# Patient Record
Sex: FEMALE | Race: WHITE | NOT HISPANIC OR LATINO | Employment: FULL TIME | URBAN - METROPOLITAN AREA
[De-identification: names, ages, dates, MRNs, and addresses within clinical notes are randomized per-mention and may not be internally consistent; named-entity substitution may affect disease eponyms.]

---

## 2017-02-07 RX ORDER — ESTRADIOL 0.04 MG/D
FILM, EXTENDED RELEASE TRANSDERMAL
Qty: 8 PATCH | Refills: 0 | Status: SHIPPED | OUTPATIENT
Start: 2017-02-07 | End: 2017-03-02 | Stop reason: SDUPTHER

## 2017-02-24 PROBLEM — R53.83 FATIGUE: Status: ACTIVE | Noted: 2017-02-24

## 2017-02-24 PROBLEM — J30.9 ALLERGIC RHINITIS: Status: ACTIVE | Noted: 2017-02-24

## 2017-02-24 PROBLEM — E78.00 HYPERCHOLESTEROLEMIA: Status: ACTIVE | Noted: 2017-02-24

## 2017-02-24 PROBLEM — E55.9 VITAMIN D DEFICIENCY: Status: ACTIVE | Noted: 2017-02-24

## 2017-02-24 PROBLEM — E78.00 HYPERCHOLESTEROLEMIA: Status: RESOLVED | Noted: 2017-02-24 | Resolved: 2017-02-24

## 2017-02-24 PROBLEM — G47.00 INSOMNIA: Status: ACTIVE | Noted: 2017-02-24

## 2017-02-24 PROBLEM — E55.9 VITAMIN D DEFICIENCY: Status: RESOLVED | Noted: 2017-02-24 | Resolved: 2017-02-24

## 2017-02-24 PROBLEM — J30.9 ALLERGIC RHINITIS: Status: RESOLVED | Noted: 2017-02-24 | Resolved: 2017-02-24

## 2017-02-24 PROBLEM — G62.9 PERIPHERAL NEUROPATHY: Status: ACTIVE | Noted: 2017-02-24

## 2017-02-24 PROBLEM — G62.9 PERIPHERAL NEUROPATHY: Status: RESOLVED | Noted: 2017-02-24 | Resolved: 2017-02-24

## 2017-03-02 ENCOUNTER — OFFICE VISIT (OUTPATIENT)
Dept: OBSTETRICS AND GYNECOLOGY | Age: 57
End: 2017-03-02

## 2017-03-02 VITALS
HEIGHT: 65 IN | DIASTOLIC BLOOD PRESSURE: 72 MMHG | BODY MASS INDEX: 22.99 KG/M2 | SYSTOLIC BLOOD PRESSURE: 112 MMHG | WEIGHT: 138 LBS

## 2017-03-02 DIAGNOSIS — Z78.0 MENOPAUSE: ICD-10-CM

## 2017-03-02 DIAGNOSIS — Z79.890 POST-MENOPAUSE ON HRT (HORMONE REPLACEMENT THERAPY): ICD-10-CM

## 2017-03-02 DIAGNOSIS — N76.0 VAGINITIS AND VULVOVAGINITIS: ICD-10-CM

## 2017-03-02 DIAGNOSIS — Z01.419 WELL WOMAN EXAM WITH ROUTINE GYNECOLOGICAL EXAM: Primary | ICD-10-CM

## 2017-03-02 LAB
BILIRUB BLD-MCNC: NEGATIVE MG/DL
GLUCOSE UR STRIP-MCNC: NEGATIVE MG/DL
KETONES UR QL: NEGATIVE
LEUKOCYTE EST, POC: ABNORMAL
NITRITE UR-MCNC: NEGATIVE MG/ML
PH UR: 7 [PH] (ref 5–8)
PROT UR STRIP-MCNC: NEGATIVE MG/DL
RBC # UR STRIP: NEGATIVE /UL
SP GR UR: 1.03 (ref 1–1.03)
UROBILINOGEN UR QL: NORMAL

## 2017-03-02 PROCEDURE — 81003 URINALYSIS AUTO W/O SCOPE: CPT | Performed by: OBSTETRICS & GYNECOLOGY

## 2017-03-02 PROCEDURE — 99396 PREV VISIT EST AGE 40-64: CPT | Performed by: OBSTETRICS & GYNECOLOGY

## 2017-03-02 RX ORDER — ESTRADIOL 0.04 MG/D
FILM, EXTENDED RELEASE TRANSDERMAL
Qty: 8 PATCH | Refills: 12 | Status: SHIPPED | OUTPATIENT
Start: 2017-03-02 | End: 2018-03-07 | Stop reason: SDUPTHER

## 2017-03-02 NOTE — PROGRESS NOTES
ANNUAL GYN EXAM    Subjective 5  Aleja Garcia is a 56 y.o., G5, ,  White  Female.    Chief complaint: Annual Exam    History of Present Illness:     No GYN complaints    1.Menstrual Hx: LMP= .  ( but had FSH= 129 in .)  Her  was 6 months before her LMP.  She would have hot flashes off and on multiple times during the day.  She occasionally had mild night sweats.  [place] was uncomfortable.  She was using lubrication.  Then last year she began using estradiol patches 0.0375 mg twice weekly and Prometrium 100 mg daily, and is doing much better.  We discussed the possible addition of testosterone cream and she will call if she wants to pursue that.    2.Pap Smear Hx: Never had an abnormal Pap smear.  [id number], normal annual Pap smears.  , negative Pap smear, negative HR-HPV.  , also had biopsy of the small granular tuft of tissue at 5:00= chronic cervicitis and squamous metaplasia.  Next Pap smear .    3.Breast / Ovarian Hx: Occasional SBE.     No family history of breast cancer or ovarian cancer.    4.Family Hx of Colon Ca:  None. Had a colonscopy at age 51.  She was called for a F/U at 5 years, but uncertain why.  No family history of colon cancer and her colonoscopy, apparently, was normal.  I recommended she call their office and find out the details of her last colonoscopy.     Family Hx of Uterine Ca:  None.     5.New Past Medical Hx:   None.  It is not new but she has been battling with a peripheral neuropathy for at least 10 years.  Since her delivery by  section in , she has gradually developed a burning, tingling sensation in her feet.  It is uncomfortable for her to walk barefoot it or in her sock feet.  She has to wear open shoes frequently because closed shoes using intensify the sensation.  She can no longer flex her toes but down she can hyperextend them and you can physically flex her toes down but she cannot do that with the muscles in her feet.  This  "affects both feet equally.  She has no other motor or sensory complaints.  She's never had a CT scan or MRI of her head or back(as of last year), but she has had a couple of nerve conduction studies done.    6.[facility] Medical Hx:  Father Dx'd with Lung Cancer.       The following portions of the patient's history were reviewed / updated as appropriate: allergies, current medications, past medical history, past surgical history, past family history, past social history, and problem list.      Review of Systems   HENT: Positive for congestion (secondary to allergies, uses Claritin and Flonase as needed.).    Eyes: Positive for visual disturbance (wears contacts).   Gastrointestinal:        More gas.   Skin:        Has a history of moles and periodically sees a dermatologist.  Her current dermatologist has retired.   Allergic/Immunologic: Positive for environmental allergies (seasonal allergies.).   Neurological: Positive for numbness (peripheral neuropathy developing since 2005.) and headaches (sinus headaches).   All other systems reviewed and are negative.      Objective     Visit Vitals   • /72   • Ht 64.5\" (163.8 cm)   • Wt 138 lb (62.6 kg)   • BMI 23.32 kg/m2       PHYSICAL EXAM    Constitutional: Well-developed, well-nourished, thin white female, in no distress, alert and well oriented ×3.    Skin is warm, dry, without rash.       Neck appears normal, trachea in the midline.  Thyroid exam normal.  No carotid bruits bilaterally.         No cervical lymphadenopathy.    Lungs clear to auscultation.  Chest wall appears normal.    Heart with regular rhythm without murmur or gallop.    Breasts: SBE was strongly encouraged.        Right breast nontender, without dominant mass.  No nipple discharge.            No superficial skin changes.  No axillary adenopathy.        Left breast nontender, without dominant mass.  No nipple discharge.            No superficial skin changes.  No axillary adenopathy.      Abdomen " is flat, soft and nontender.No palpable mass.           No hepatosplenomegaly.  Flanks are negative.          Bowel sounds normal.  No abdominal bruit.          No inguinal lymphadenopathy bilaterally.     Pelvic exam :  Vulva normal.           External genitalia normal.  BUS negative.  Yellowish discharge at the introitus.            Introitus normal, but yellowish discharge is present.  Vaginal mucosa looks estrogenized.  Whitish discharge and upper vagina.  Wet prep was normal.  Rare parabasal cells seen.          Cervix normal, slight eversion, no Pap smear, no cervical motion tenderness.          Uterus midplane to slightly retroverted, normal size, normal shape, and position.          Adnexa are negative bilaterally.  No tenderness.  No palpable mass.          Rectovaginal exam negative .  Stool heme negative.    Musc /Skel: Lower extremities without edema.     Neuro: Coordination is grossly normal.  Gait is normal.    Psych: Mood and affect is normal.  Behavior normal.  Thought content normal.            Judgment normal.          Aleja was seen today for gynecologic exam.    Diagnoses and all orders for this visit:    Well woman exam with routine gynecological exam  -     POC Urinalysis Dipstick, Automated    Post-menopause on HRT (hormone replacement therapy)  -     progesterone (PROMETRIUM) 100 MG capsule; Take 1 capsule by mouth Daily.  -     estradiol (VIVELLE-DOT) 0.0375 MG/24HR; APPLY 1 PATCH EXTERNALLY TO THE SKIN 2 TIMES A WEEK.    Menopause  -     progesterone (PROMETRIUM) 100 MG capsule; Take 1 capsule by mouth Daily.  -     estradiol (VIVELLE-DOT) 0.0375 MG/24HR; APPLY 1 PATCH EXTERNALLY TO THE SKIN 2 TIMES A WEEK.    Vaginitis and vulvovaginitis  Comments:  Asymptomatic.  Yellow discharge noticed at the introitus.  But is normal-appearing and vagina.  Wet prep negative.NuSwab sent.  Orders:  -     NuSwab Vaginitis (VG)      COMMENTS: The yellowish discharge at the introitus was a little bit of a  surprise.  The discharge and upper vagina looks normal.  Wet prep did not show yeast or BV and there were rare parabasal cells possibly suggesting that she could use with a higher dose of estrogen from a vaginal standpoint.  I explained this to her. She really does not have vaginal complaints though.  NuSwab of the vaginal discharge was sent.

## 2017-03-07 LAB
A VAGINAE DNA VAG QL NAA+PROBE: ABNORMAL SCORE
BVAB2 DNA VAG QL NAA+PROBE: ABNORMAL SCORE
C ALBICANS DNA VAG QL NAA+PROBE: POSITIVE
C GLABRATA DNA VAG QL NAA+PROBE: NEGATIVE
MEGA1 DNA VAG QL NAA+PROBE: ABNORMAL SCORE
T VAGINALIS RRNA SPEC QL NAA+PROBE: NEGATIVE

## 2017-03-17 RX ORDER — FLUCONAZOLE 150 MG/1
150 TABLET ORAL ONCE
Qty: 1 TABLET | Refills: 0 | Status: SHIPPED | OUTPATIENT
Start: 2017-03-17 | End: 2017-03-17

## 2017-03-17 RX ORDER — FLUCONAZOLE 150 MG/1
TABLET ORAL
Qty: 2 TABLET | Refills: 0 | Status: SHIPPED | OUTPATIENT
Start: 2017-03-17 | End: 2017-03-17

## 2017-03-17 NOTE — PROGRESS NOTES
Notify Aleja that the vaginal swab test did come back positive for yeast, Candida albicans, and was negative for bacterial vaginosis and Trichomonas.  Since she wasn't having any symptoms we should treat this with a single dose of Diflucan 150 mg.

## 2017-03-31 DIAGNOSIS — Z78.0 MENOPAUSE: ICD-10-CM

## 2017-03-31 DIAGNOSIS — Z79.890 POST-MENOPAUSE ON HRT (HORMONE REPLACEMENT THERAPY): ICD-10-CM

## 2018-03-07 ENCOUNTER — OFFICE VISIT (OUTPATIENT)
Dept: OBSTETRICS AND GYNECOLOGY | Age: 58
End: 2018-03-07

## 2018-03-07 VITALS
WEIGHT: 140 LBS | BODY MASS INDEX: 23.32 KG/M2 | HEIGHT: 65 IN | SYSTOLIC BLOOD PRESSURE: 124 MMHG | DIASTOLIC BLOOD PRESSURE: 80 MMHG

## 2018-03-07 DIAGNOSIS — Z79.890 POST-MENOPAUSE ON HRT (HORMONE REPLACEMENT THERAPY): ICD-10-CM

## 2018-03-07 DIAGNOSIS — Z11.51 SCREENING FOR HUMAN PAPILLOMAVIRUS: ICD-10-CM

## 2018-03-07 DIAGNOSIS — Z78.0 MENOPAUSE: ICD-10-CM

## 2018-03-07 DIAGNOSIS — Z01.419 WELL WOMAN EXAM WITH ROUTINE GYNECOLOGICAL EXAM: Primary | ICD-10-CM

## 2018-03-07 DIAGNOSIS — N88.9 CERVICAL LESION: ICD-10-CM

## 2018-03-07 PROBLEM — J30.9 ALLERGIC RHINITIS: Status: ACTIVE | Noted: 2017-02-24

## 2018-03-07 PROBLEM — E55.9 VITAMIN D DEFICIENCY: Status: ACTIVE | Noted: 2017-02-24

## 2018-03-07 LAB
BILIRUB BLD-MCNC: NEGATIVE MG/DL
CLARITY, POC: CLEAR
COLOR UR: YELLOW
GLUCOSE UR STRIP-MCNC: NEGATIVE MG/DL
KETONES UR QL: NEGATIVE
LEUKOCYTE EST, POC: NEGATIVE
NITRITE UR-MCNC: NEGATIVE MG/ML
PH UR: 7 [PH] (ref 5–8)
PROT UR STRIP-MCNC: NEGATIVE MG/DL
RBC # UR STRIP: NEGATIVE /UL
SP GR UR: 1.02 (ref 1–1.03)
UROBILINOGEN UR QL: NORMAL

## 2018-03-07 PROCEDURE — 99396 PREV VISIT EST AGE 40-64: CPT | Performed by: OBSTETRICS & GYNECOLOGY

## 2018-03-07 PROCEDURE — 81003 URINALYSIS AUTO W/O SCOPE: CPT | Performed by: OBSTETRICS & GYNECOLOGY

## 2018-03-07 RX ORDER — ESTRADIOL 0.04 MG/D
1 FILM, EXTENDED RELEASE TRANSDERMAL 2 TIMES WEEKLY
Qty: 24 PATCH | Refills: 4 | Status: SHIPPED | OUTPATIENT
Start: 2018-03-08 | End: 2019-03-12 | Stop reason: DRUGHIGH

## 2018-03-07 NOTE — PROGRESS NOTES
Routine Annual Visit    3/7/2018    Patient: Aleja Garcia          MR#:[id number]    Chief Complaint   Patient presents with   • Gynecologic Exam     ALEJA IS HERE FOR HER ANNUAL EXAM AND MAMMOGRAM.  HER LAST PAP SMEAR WAS IN , NEG PAP, NEG HR-HPV. LAST COLONOSCOPY WAS ~ 6 YEARS AGO.  DOING WELL ON HRT (PATCH AND PROGESTERONE).         57 y.o. female  who presents for annual exam.     HISTORY OF PRESENT ILLNESS:    GYN Complaints:    =Vulvar itching few months ago, treated with OTC yeast prep, then took antibiotics a few weeks ago, not having any itching yet.    =Post coital bleeding, immediately after, no subsequent brown discharge. No ext pain with coitus.    =And decreased libido, since menopause, not a problem.    Other Complaints: Neuropathy in feet for 12 years, since last C Section. Unknown etiology.    GYN HISTORY:  1.  Menstrual Hx:  PM         HRT:  yes - .0375 ESTRADIOL PATCH  MG PROGESTERONE: Ccc hot flash at night, no night sweats.         Current contraception: post menopausal status    2.  History of abnormal Pap smear: no.          Pap smear Hx:  Never had an abnormal Pap smear.  [id number], normal annual Pap smears.  , negative Pap smear, negative HR-HPV.  , also had biopsy of the small granular tuft of tissue at 5:00= chronic cervicitis and squamous metaplasia.    NEW PAST MEDICAL HISTORY:    3.  New Medical Hx:  None.    4.  New Surgical Hx:  None.    5.  New Family Medical Hx:  None.    6.  SCREENINGS:  =Family history of breast cancer: no  Perform regular self breast exam: no, occ  Breast self-examination technique  reviewed and the patient encouraged to perform self breast exams monthly.     =Family history of ovarian cancer: no  =Family history of uterine cancer: no  =Family History of colon cancer: no    Mammogram: done today.  Colonoscopy: up to date.  normal repeat in ten years (confirmed with Dr. Mendenhall's office)  DEXA: .    7.  LIFESTYLE:  =Diet: Good,  "healthy..     =Exercise:  yes - two to three times per week.   =I recommended 30 minutes of aerobic exercise 5 times a week.     =Calcium  yes - Multivit..  =Vitamin D:  yes - 1000 IU / day..   = We discussed calcium intake needs to help prevent osteoporosis:      Recommended 600 mg of calcium daily and 1000 IUs of vitamin D 3 daily.      Review of Systems   Constitutional: Negative.    HENT: Negative.    Eyes: Negative.    Respiratory: Negative.    Cardiovascular: Positive for leg swelling.   Gastrointestinal: Positive for diarrhea.   Endocrine:        DECREASED LIBIDO   Genitourinary:        VULVAR ITCHING  POST COITAL BLEEDING   Musculoskeletal: Negative.    Skin:        ACNE     Allergic/Immunologic: Positive for environmental allergies.   Neurological: Negative.    Hematological: Negative.    Psychiatric/Behavioral: Negative.        Objective     /80 (BP Location: Left arm, Patient Position: Sitting, Cuff Size: Small Adult)  Ht 163.8 cm (64.5\")  Wt 63.5 kg (140 lb)  LMP 03/07/2012  Breastfeeding? No  BMI 23.66 kg/m2    PHYSICAL EXAM    Constitutional: Well-developed, well-nourished, thin  female, in no distress, alert and well oriented ×3.    Skin is warm, dry, without rash.       Neck appears normal, trachea in the midline.  Thyroid exam normal.  No carotid bruits bilaterally.         No cervical lymphadenopathy.    Lungs clear to auscultation.  Chest wall appears normal.    Heart with regular rhythm without murmur or gallop.    Breasts: Regular self breast exams encouraged.        Right breast nontender, without dominant mass.  No nipple discharge.            No superficial skin changes.  No axillary adenopathy.        Left breast nontender, without dominant mass.  No nipple discharge.            No superficial skin changes.  No axillary adenopathy.      Abdomen is flat, soft and nontender.No palpable mass.           No hepatosplenomegaly.  Flanks are negative.          Bowel sounds " normal.  No abdominal bruit.          No inguinal lymphadenopathy bilaterally.     Pelvic exam :  Vulva normal.           External genitalia normal.  BUS negative.             Introitus normal.  Vaginal mucosa normal.  Well estrogenized.           Cervix normal, Pap with HPV.  Small tuft of granular tissue at 5:00 again, similar site to her previous biopsy.  This was biopsied again attempting to remove it in its entirety. No cervical motion tenderness.          Uterus slightly retroverted, normal size, normal shape, and position.          Adnexa are negative bilaterally.  No tenderness.  No palpable mass.          Rectovaginal exam negative .  Stool heme negative.    Musc /Skel: Lower extremities without edema.     Neuro: Coordination is grossly normal.  Gait is normal.    Psych: Mood and affect is normal.  Behavior normal.  Thought content normal.            Judgment normal.       =All questions were answered.      Assessment/Plan   Aleja was seen today for gynecologic exam.    Diagnoses and all orders for this visit:    Well woman exam with routine gynecological exam  -     POC Urinalysis Dipstick, Automated  -     PapIG, HPV, Rfx 16 / 18    Screening for human papillomavirus  -     PapIG, HPV, Rfx 16 / 18    Menopause  -     estradiol (VIVELLE-DOT) 0.0375 MG/24HR; Place 1 patch on the skin 2 (Two) Times a Week.  -     progesterone (PROMETRIUM) 100 MG capsule; Take 1 capsule by mouth every night at bedtime for 30 days.    Post-menopause on HRT (hormone replacement therapy)  -     estradiol (VIVELLE-DOT) 0.0375 MG/24HR; Place 1 patch on the skin 2 (Two) Times a Week.  -     progesterone (PROMETRIUM) 100 MG capsule; Take 1 capsule by mouth every night at bedtime for 30 days.    Cervical lesion  -     Reference Histopathology        COMMENTS: Recurrence of what appears to be granulation tissue on the cervix at the site of her previous biopsy for the same thing.  Otherwise doing well on HRT, will continue.    Stuart AREVALO  MD Bernard  3/7/2018

## 2018-03-09 LAB
CYTOLOGIST CVX/VAG CYTO: ABNORMAL
CYTOLOGY CVX/VAG DOC THIN PREP: ABNORMAL
DX ICD CODE: ABNORMAL
DX ICD CODE: ABNORMAL
HIV 1 & 2 AB SER-IMP: ABNORMAL
HPV I/H RISK 1 DNA CVX QL PROBE+SIG AMP: NEGATIVE
OTHER STN SPEC: ABNORMAL
PATH REPORT.FINAL DX SPEC: ABNORMAL
PATHOLOGIST CVX/VAG CYTO: ABNORMAL
STAT OF ADQ CVX/VAG CYTO-IMP: ABNORMAL

## 2018-03-13 LAB
DX ICD CODE: NORMAL
DX ICD CODE: NORMAL
PATH REPORT.FINAL DX SPEC: NORMAL
PATH REPORT.GROSS SPEC: NORMAL
PATH REPORT.RELEVANT HX SPEC: NORMAL
PATH REPORT.SITE OF ORIGIN SPEC: NORMAL
PATHOLOGIST NAME: NORMAL
PAYMENT PROCEDURE: NORMAL

## 2018-03-17 NOTE — PROGRESS NOTES
Notify Aleja that the cervical biopsy came back just benign squamous mucosa with a benign endocervical mucosal polyp.  Nothing abnormal.  However the Pap smear did show some atypical cells but was negative for high-risk HPV.  We will manage this by just repeating the Pap smear in one year.  I suspect the atypical cells are actually coming off of the mucosal polyp which we know is okay.  So I will just repeat her Pap smear again next year.

## 2018-03-19 ENCOUNTER — TELEPHONE (OUTPATIENT)
Dept: OBSTETRICS AND GYNECOLOGY | Age: 58
End: 2018-03-19

## 2018-03-19 NOTE — TELEPHONE ENCOUNTER
----- Message from Stuart Wagner MD sent at 3/17/2018  1:54 PM EDT -----  Notify Aleja that the cervical biopsy came back just benign squamous mucosa with a benign endocervical mucosal polyp.  Nothing abnormal.  However the Pap smear did show some atypical cells but was negative for high-risk HPV.  We will manage this by just repeating the Pap smear in one year.  I suspect the atypical cells are actually coming off of the mucosal polyp which we know is okay.  So I will just repeat her Pap smear again next year.

## 2018-12-21 ENCOUNTER — TELEPHONE (OUTPATIENT)
Dept: OBSTETRICS AND GYNECOLOGY | Age: 58
End: 2018-12-21

## 2019-03-11 DIAGNOSIS — Z78.0 MENOPAUSE: ICD-10-CM

## 2019-03-11 DIAGNOSIS — Z79.890 POST-MENOPAUSE ON HRT (HORMONE REPLACEMENT THERAPY): ICD-10-CM

## 2019-03-11 NOTE — TELEPHONE ENCOUNTER
Pt was seen in 2018 and has appt with Dr. GEIGER this month for AE.  I see message in chart that says to schedule with Dr. Geiger in 2020.  Do you want me to r/s her with you?

## 2019-03-12 ENCOUNTER — OFFICE VISIT (OUTPATIENT)
Dept: OBSTETRICS AND GYNECOLOGY | Age: 59
End: 2019-03-12

## 2019-03-12 ENCOUNTER — APPOINTMENT (OUTPATIENT)
Dept: WOMENS IMAGING | Facility: HOSPITAL | Age: 59
End: 2019-03-12

## 2019-03-12 ENCOUNTER — PROCEDURE VISIT (OUTPATIENT)
Dept: OBSTETRICS AND GYNECOLOGY | Age: 59
End: 2019-03-12

## 2019-03-12 VITALS
BODY MASS INDEX: 23.66 KG/M2 | HEIGHT: 65 IN | DIASTOLIC BLOOD PRESSURE: 74 MMHG | SYSTOLIC BLOOD PRESSURE: 124 MMHG | WEIGHT: 142 LBS

## 2019-03-12 DIAGNOSIS — Z79.890 POST-MENOPAUSE ON HRT (HORMONE REPLACEMENT THERAPY): ICD-10-CM

## 2019-03-12 DIAGNOSIS — Z78.0 MENOPAUSE: ICD-10-CM

## 2019-03-12 DIAGNOSIS — Z12.31 VISIT FOR SCREENING MAMMOGRAM: Primary | ICD-10-CM

## 2019-03-12 DIAGNOSIS — Z01.419 WELL WOMAN EXAM WITH ROUTINE GYNECOLOGICAL EXAM: Primary | ICD-10-CM

## 2019-03-12 DIAGNOSIS — Z13.89 SCREENING FOR HEMATURIA OR PROTEINURIA: ICD-10-CM

## 2019-03-12 DIAGNOSIS — Z12.4 CERVICAL CANCER SCREENING: ICD-10-CM

## 2019-03-12 DIAGNOSIS — Z11.51 SCREENING FOR HPV (HUMAN PAPILLOMAVIRUS): ICD-10-CM

## 2019-03-12 LAB
BILIRUB BLD-MCNC: NEGATIVE MG/DL
CLARITY, POC: CLEAR
COLOR UR: YELLOW
GLUCOSE UR STRIP-MCNC: NEGATIVE MG/DL
KETONES UR QL: NEGATIVE
LEUKOCYTE EST, POC: NEGATIVE
NITRITE UR-MCNC: NEGATIVE MG/ML
PH UR: 7 [PH] (ref 5–8)
PROT UR STRIP-MCNC: NEGATIVE MG/DL
RBC # UR STRIP: NEGATIVE /UL
SP GR UR: 1.03 (ref 1–1.03)
UROBILINOGEN UR QL: NORMAL

## 2019-03-12 PROCEDURE — 77067 SCR MAMMO BI INCL CAD: CPT | Performed by: RADIOLOGY

## 2019-03-12 PROCEDURE — 99396 PREV VISIT EST AGE 40-64: CPT | Performed by: OBSTETRICS & GYNECOLOGY

## 2019-03-12 PROCEDURE — 77067 SCR MAMMO BI INCL CAD: CPT | Performed by: OBSTETRICS & GYNECOLOGY

## 2019-03-12 PROCEDURE — 81002 URINALYSIS NONAUTO W/O SCOPE: CPT | Performed by: OBSTETRICS & GYNECOLOGY

## 2019-03-12 RX ORDER — FAMOTIDINE 20 MG
1000 TABLET ORAL DAILY
COMMUNITY

## 2019-03-12 RX ORDER — ESTRADIOL 0.05 MG/D
1 FILM, EXTENDED RELEASE TRANSDERMAL 2 TIMES WEEKLY
Qty: 24 PATCH | Refills: 4 | Status: SHIPPED | OUTPATIENT
Start: 2019-03-14 | End: 2019-03-18 | Stop reason: DRUGHIGH

## 2019-03-12 NOTE — PROGRESS NOTES
Routine Annual Visit    2019.    Patient: Aleja Garcia          MR#:[id number]    Chief Complaint   Patient presents with   • Gynecologic Exam     AE and MG today.  2018, ASCUS, neg HPV. Cervical bx = B9 (no hx of dysplasia).  C-scope , normal repeat in 10 years.  PM with HRT.  Currently experiencing hot flashes in the morning and urge incontinence.       58 y.o. female  who presents for annual exam.     HISTORY OF PRESENT ILLNESS:    GYN Complaints:    =Hot flashes upon awakening in the morning, not any during the day. No NS.    =Urge incontinence, just a few drops, has always worn a panty liner for years pad( for 20 years). Discussed Kegels.    Other Complaints: None.    GYN HISTORY:    1.  Menstrual Hx: PM     HRT: 0.0375 estradiol patch 2 x per week, 100 mg progesterone.         Current contraception: post menopausal status    2.  History of abnormal Pap smear: no.         Pap smear Hx: 1965-1941, normal annual Pap smears.  , negative Pap smear, negative HR-HPV.  , also had biopsy of the small granular tuft of tissue at 5:00= chronic cervicitis and squamous metaplasia.   2018, ASCUS, Neg HR-HPV.    PAST MEDICAL HISTORY:       has a current medication list which includes the following prescription(s): fluticasone, loratadine, multiple vitamins-minerals, vitamin d (cholecalciferol), estradiol, and progesterone.    3.  New Medical Hx:  None.        Past Medical History:   Diagnosis Date   • Allergic rhinitis 2017   • History of Papanicolaou smear of cervix     Never Had Dysplasia on a Pap Smear.  [id number], Normal Annual Pap Smears.  , Bx of a Small Granular Tuft of Tissue at 5:00 = Chronic Cervicitis and Sq Metaplasia.  , Neg Pap Smear, Neg HR-HPV.  2018, ASCUS, Neg HR-HPV.   • Hot flashes    • Menopause     LMP =    • Miscarriage  and     2 spontaneous ABs, D&C for both.   • Night sweats     mild   • Peripheral neuropathy     Peripheral neuropathy  "in both feet since last .  Unknown etiology.   • Post-menopause on HRT (hormone replacement therapy)     Continuously on HRT since about    • Vaginal delivery     x2   baby girl \"BROCK\"     baby girl \"SUNITHA\"   • Vitamin D deficiency 2017    Takes 1000 IU / day.       4.  New Surgical Hx:  None.        Past Surgical History:   Procedure Laterality Date   • BREAST BIOPSY      Path= Negative.   •  SECTION      baby boy \"PETTY\" ; CS for placenta previa.   • COLONOSCOPY  [id number]    Normal @ 51. Repeat in ten years.   • DIAGNOSTIC LAPAROSCOPY      Dr. Fabian Al, hysteroscopy w/ separtion of minimal intrauterine septum.  Laparoscopy w/ CO2 laser, lysis of adhesions and ablation of endometriosis.  Endometriosis found in the lateral LEFT pelvic sidewall, peritoneum and deeper endometriosis of the LEFT and RIGHT uterosacral ligaments.  Complex superficial endometriosis of the LEFT and RIGHT ovary.Fallopian tubes were patent with methyleneblue   • DILATATION AND CURETTAGE  1998    x2           5.  New Family Medical Hx:  None.        Family History   Problem Relation Age of Onset   • Dementia Mother 75        Alzheimers Diseasse.   • Cataracts Mother    • Hyperlipidemia Father    • Hypertension Father    • Heart valve disorder Father         valve repair    • COPD Father         non-smoker   • Cataracts Father    • Seizures Sister 48   • Cataracts Maternal Grandmother    • Osteoarthritis Maternal Grandmother    • Osteoarthritis Paternal Grandmother    • No Known Problems Daughter    • Down syndrome Daughter    • Rheum arthritis Daughter         remission    • ADD / ADHD Son    • Other Son         Human Growth Hormone deficiency   • No Known Problems Brother         adopted    • No Known Problems Maternal Grandfather          in his 80's.   • No Known Problems Paternal Grandfather          at ~ 89.   • No Known Problems Sister    • Diabetes type II Maternal Aunt  " "  • Testicular cancer Maternal Uncle    • BRCA 1/2 Neg Hx    • Breast cancer Neg Hx    • Colon cancer Neg Hx    • Endometrial cancer Neg Hx    • Ovarian cancer Neg Hx        6.  Social History     Tobacco Use   Smoking Status Never Smoker   Smokeless Tobacco Never Used         Review of Systems   Constitutional: Positive for fever.   HENT: Positive for sinus pressure.    Respiratory: Positive for cough.    Genitourinary:        URGE INCONTINENCE  HOT FLASHES IN THE AM   Allergic/Immunologic: Positive for environmental allergies.   Neurological: Positive for headaches.   All other systems reviewed and are negative.        SCREENINGS:  =Family history of breast cancer: no  =Family history of ovarian cancer: no  =Family history of uterine cancer: no  =Family History of colon cancer: no   Mat Uncle  early from Testicular cancer.    Perform regular self breast exam: no  Breast self-examination technique  reviewed and the patient encouraged to perform self breast exams monthly.     Mammogram: done today.  Colonoscopy: up to date. , normal, repeat in ten years.   DEXA: not indicated.      LIFESTYLE:  =Diet: Healthy.        =Exercise:  yes - 3 to 4 days per week.  Aleja joined a gym this year. .   =I recommended 30 minutes of aerobic exercise 5 times a week.     =Calcium:  yes - Multivitamin.  =Vitamin D:  yes - 1000 IU/day.   = We discussed calcium intake needs to help prevent osteoporosis:      Recommended 600 mg of calcium daily and 1000 IUs of vitamin D 3 daily.        Objective     /74   Ht 163.8 cm (64.5\")   Wt 64.4 kg (142 lb)   LMP 2012 Comment: HRT  Breastfeeding? No   BMI 24.00 kg/m²     PHYSICAL EXAM    OBGyn Exam    Constitutional: Well-developed, well-nourished, thin  female, in no distress, alert and well oriented ×3.    Skin is warm, dry, without rash.       Neck: Normal, trachea in the midline.  Thyroid exam normal. No carotid bruits bilaterally.  No cervical " lymphadenopathy.    Back: Normal.  No CVA tenderness.    Lungs: Clear to auscultation.  Chest wall appears normal.    Heart:: Regular Rhythm without murmur or gallop.    Breasts: Once again, regular self breast exams each month strongly encouraged.        Right breast nontender, without dominant mass.  No nipple discharge.            No superficial skin changes.  No axillary adenopathy.        Left breast nontender, without dominant mass.  No nipple discharge.            No superficial skin changes.  No axillary adenopathy.      Abdomen: Flat, soft and nontender.No palpable mass.           No hepatosplenomegaly.  Flanks are negative.          Bowel sounds normal.  No abdominal bruit.          No inguinal lymphadenopathy bilaterally.     Pelvic exam :  Vulva normal.           External genitalia normal.  BUS negative.             Introitus normal.  Vaginal mucosa normal.  Well estrogenized.          Cervix normal, Pap with HPV.  No cervical motion tenderness.          Uterus midplane to anteverted, normal size, normal shape, and position. Not tender.          Adnexa are negative bilaterally.  No tenderness.  No palpable mass.          Rectovaginal exam negative  Stool heme negative.    Musc /Skel: Lower extremities without edema.     Neuro: Coordination is grossly normal.  Gait is normal.    Psych: Mood and affect is normal.  Behavior normal.  Thought content normal.            Judgment normal.       =All questions were answered.      Assessment/Plan   Aleja was seen today for gynecologic exam.    Diagnoses and all orders for this visit:    Well woman exam with routine gynecological exam  -     PapIG, HPV, Rfx 16 / 18    Menopause  -     progesterone (PROMETRIUM) 100 MG capsule; Take 1 capsule by mouth every night at bedtime for 30 days.  -     estradiol (MINIVELLE, VIVELLE-DOT) 0.05 MG/24HR patch; Place 1 patch on the skin as directed by provider 2 (Two) Times a Week.    Post-menopause on HRT (hormone replacement  therapy)  -     progesterone (PROMETRIUM) 100 MG capsule; Take 1 capsule by mouth every night at bedtime for 30 days.  -     estradiol (MINIVELLE, VIVELLE-DOT) 0.05 MG/24HR patch; Place 1 patch on the skin as directed by provider 2 (Two) Times a Week.    Screening for hematuria or proteinuria  -     POC Urinalysis Dipstick    Screening for HPV (human papillomavirus)  -     PapIG, HPV, Rfx 16 / 18    Cervical cancer screening  -     PapIG, HPV, Rfx 16 / 18      COMMENTS: Normal GYN exam.  Doing well on HRT but will increase estradiol patches to 0.05 mg.  Continue progesterone 100 mg nightly.  Had Pap with HPV today and mammogram today.  Colonoscopy up-to-date.  Does not need DEXA scan..  Has requested to follow-up with Dr. Geiger next year, she sees her daughter Rin, who has Down syndrome.    Stuart Wagner MD  3/14/2019 (late note completion).

## 2019-03-15 DIAGNOSIS — R92.8 ABNORMAL MAMMOGRAM OF RIGHT BREAST: Primary | ICD-10-CM

## 2019-03-15 LAB
CYTOLOGIST CVX/VAG CYTO: NORMAL
CYTOLOGY CVX/VAG DOC THIN PREP: NORMAL
DX ICD CODE: NORMAL
HIV 1 & 2 AB SER-IMP: NORMAL
HPV I/H RISK 1 DNA CVX QL PROBE+SIG AMP: NEGATIVE
Lab: NORMAL
OTHER STN SPEC: NORMAL
PATH REPORT.FINAL DX SPEC: NORMAL
STAT OF ADQ CVX/VAG CYTO-IMP: NORMAL

## 2019-03-15 NOTE — PROGRESS NOTES
March 15, 2019.  3:24 PM.  I called Aleja and notified her that her mammogram showed a 16 mm area of focal asymmetry in the right breast.  Radiology has requested a spot magnification and ultrasound to evaluate this area.  I have placed order for the diagnostic mammogram with Tomosynthesis and limited left breast ultrasound.

## 2019-03-18 RX ORDER — ESTRADIOL 0.04 MG/D
FILM, EXTENDED RELEASE TRANSDERMAL
Qty: 24 PATCH | Refills: 0 | Status: SHIPPED | OUTPATIENT
Start: 2019-03-18 | End: 2019-04-11

## 2019-03-26 ENCOUNTER — APPOINTMENT (OUTPATIENT)
Dept: WOMENS IMAGING | Facility: HOSPITAL | Age: 59
End: 2019-03-26

## 2019-03-26 PROCEDURE — 77065 DX MAMMO INCL CAD UNI: CPT | Performed by: RADIOLOGY

## 2019-03-26 PROCEDURE — 76641 ULTRASOUND BREAST COMPLETE: CPT | Performed by: RADIOLOGY

## 2019-03-26 PROCEDURE — MDREVIEWSP: Performed by: RADIOLOGY

## 2019-03-26 PROCEDURE — 77061 BREAST TOMOSYNTHESIS UNI: CPT | Performed by: RADIOLOGY

## 2019-03-26 PROCEDURE — G0279 TOMOSYNTHESIS, MAMMO: HCPCS | Performed by: RADIOLOGY

## 2019-03-27 DIAGNOSIS — R92.8 ABNORMAL MAMMOGRAM OF RIGHT BREAST: ICD-10-CM

## 2019-04-02 ENCOUNTER — APPOINTMENT (OUTPATIENT)
Dept: WOMENS IMAGING | Facility: HOSPITAL | Age: 59
End: 2019-04-02

## 2019-04-02 PROCEDURE — 19081 BX BREAST 1ST LESION STRTCTC: CPT | Performed by: RADIOLOGY

## 2019-04-11 ENCOUNTER — OFFICE VISIT (OUTPATIENT)
Dept: MAMMOGRAPHY | Facility: CLINIC | Age: 59
End: 2019-04-11

## 2019-04-11 ENCOUNTER — PREP FOR SURGERY (OUTPATIENT)
Dept: OTHER | Facility: HOSPITAL | Age: 59
End: 2019-04-11

## 2019-04-11 VITALS
BODY MASS INDEX: 23.66 KG/M2 | OXYGEN SATURATION: 98 % | DIASTOLIC BLOOD PRESSURE: 60 MMHG | SYSTOLIC BLOOD PRESSURE: 110 MMHG | HEIGHT: 65 IN | WEIGHT: 142 LBS | HEART RATE: 72 BPM | TEMPERATURE: 98 F

## 2019-04-11 DIAGNOSIS — N60.91 ATYPICAL DUCTAL HYPERPLASIA OF RIGHT BREAST: Primary | ICD-10-CM

## 2019-04-11 PROCEDURE — 99203 OFFICE O/P NEW LOW 30 MIN: CPT | Performed by: SURGERY

## 2019-04-11 RX ORDER — PHENOL 1.4 %
600 AEROSOL, SPRAY (ML) MUCOUS MEMBRANE DAILY
COMMUNITY

## 2019-04-11 RX ORDER — DIAZEPAM 5 MG/1
10 TABLET ORAL ONCE
Status: CANCELLED | OUTPATIENT
Start: 2019-04-29 | End: 2019-04-11

## 2019-04-11 RX ORDER — FLUOCINONIDE 0.5 MG/G
OINTMENT TOPICAL AS NEEDED
COMMUNITY
Start: 2019-04-10

## 2019-04-11 RX ORDER — ACETAMINOPHEN 325 MG/1
650 TABLET ORAL ONCE
Status: CANCELLED | OUTPATIENT
Start: 2019-04-29 | End: 2019-04-11

## 2019-04-11 RX ORDER — CEFAZOLIN SODIUM 2 G/100ML
2 INJECTION, SOLUTION INTRAVENOUS ONCE
Status: CANCELLED | OUTPATIENT
Start: 2019-04-29 | End: 2019-04-11

## 2019-04-11 RX ORDER — L.ACIDOPH,PLANT/B.ANIMAL,LONG 2B CELL
1 CAPSULE ORAL DAILY
COMMUNITY

## 2019-04-11 NOTE — PROGRESS NOTES
Chief Complaint: Aleja Garcia is a 58 y.o.. female here today for Abnormal Breast Imaging        History of Present Illness:  Patient presents with abnormal breast imaging. Right breast radial scar.  She is a nice 58-year-old white female who has been obtaining yearly mammograms.  Her most recent ones described a focal asymmetry in the upper inner quadrant of the right breast.  On diagnostic imaging this asymmetry resolved but they did see some indeterminate microcalcifications spanning a 2 cm area around the 3 o'clock position of the right breast.  Ultrasound revealed some complicated cysts for which a 6-month mammogram was recommended.  These calcifications were felt to be new and biopsy was recommended.  It has revealed multiple foci of atypical duct hyperplasia as well as a radial scar and sclerosing adenosis.  The patient has a history of a previous right breast biopsy in the 130 position for a palpable lesion back in 1998 that was benign.  Her family history is negative for breast or ovarian cancer.  She has been taking hormone replacement therapy but recently stopped it because of these results.      Review of Systems:  Review of Systems   Cardiovascular: Positive for leg swelling.   Skin:        The patient denies any noticeable changes to the skin of the breast.    Neurological: Positive for headaches.   All other systems reviewed and are negative.       Past Medical and Surgical History:  Breast Biopsy History:  Patient has had the following breast biopsies:1998 Right-benign 2019 Right-radial scar  Breast Cancer HIstory:  Patient does not have a past medical history of breast cancer.  Breast Operations, and year:  None    Social History     Tobacco Use   Smoking Status Never Smoker   Smokeless Tobacco Never Used     Obstetric History:  Patient is postmenopausal, entered menopause naturally at age: 53   Number of pregnancies:5  Number of live births: 3  Number of abortions or miscarriages: 2  Age of  "delivery of first child: 34  Patient breast fed, for the following lenth of time:45 months total  Length of time taking birth control pills:10 years  Patient took hormone replacement during the following dates: Took estrogen and progesterone for 5 years stopping last week.     Past Surgical History:   Procedure Laterality Date   • BREAST BIOPSY      Path= Negative.   •  SECTION      baby boy \"PETTY\" ; CS for placenta previa.   • COLONOSCOPY  [id number]    Normal @ 51. Repeat in ten years.   • DIAGNOSTIC LAPAROSCOPY      Dr. Fabian Al, hysteroscopy w/ separtion of minimal intrauterine septum.  Laparoscopy w/ CO2 laser, lysis of adhesions and ablation of endometriosis.  Endometriosis found in the lateral LEFT pelvic sidewall, peritoneum and deeper endometriosis of the LEFT and RIGHT uterosacral ligaments.  Complex superficial endometriosis of the LEFT and RIGHT ovary.Fallopian tubes were patent with methyleneblue   • DILATATION AND CURETTAGE  1998    x2           Past Medical History:   Diagnosis Date   • Allergic rhinitis 2017   • History of Papanicolaou smear of cervix     Never Had Dysplasia on a Pap Smear.  [id number], Normal Annual Pap Smears.  , Bx of a Small Granular Tuft of Tissue at 5:00 = Chronic Cervicitis and Sq Metaplasia.  , Neg Pap Smear, Neg HR-HPV.  , ASCUS, Neg HR-HPV.   • Hot flashes    • Menopause     LMP = 2014   • Miscarriage  and     2 spontaneous ABs, D&C for both.   • Night sweats     mild   • Peripheral neuropathy     Peripheral neuropathy in both feet since last .  Unknown etiology.   • Post-menopause on HRT (hormone replacement therapy)     Continuously on HRT since about    • Vaginal delivery     x2   baby girl \"BROCK\"     baby girl \"SUNITHA\"   • Vitamin D deficiency 2017    Takes 1000 IU / day.       Prior Hospitalizations, other than for surgery or childbirth, and year:  None    Social History:  Patient " is .  Patient has two daughters. and Patient has one sons.    Family History:  Family History   Problem Relation Age of Onset   • Dementia Mother 75        Alzheimers Diseasse.   • Cataracts Mother    • Hyperlipidemia Father    • Hypertension Father    • Heart valve disorder Father         valve repair    • COPD Father         non-smoker   • Cataracts Father    • Melanoma Father    • Seizures Sister 48   • Cataracts Maternal Grandmother    • Osteoarthritis Maternal Grandmother    • Osteoarthritis Paternal Grandmother    • No Known Problems Daughter    • Down syndrome Daughter    • Rheum arthritis Daughter         remission    • ADD / ADHD Son    • Other Son         Human Growth Hormone deficiency   • No Known Problems Brother         adopted    • No Known Problems Maternal Grandfather          in his 80's.   • No Known Problems Paternal Grandfather          at ~ 89.   • No Known Problems Sister    • Diabetes type II Maternal Aunt    • Diabetes Maternal Aunt    • Testicular cancer Maternal Uncle    • Heart attack Paternal Aunt    • BRCA 1/2 Neg Hx    • Breast cancer Neg Hx    • Colon cancer Neg Hx    • Endometrial cancer Neg Hx    • Ovarian cancer Neg Hx        Vital Signs:  Vitals:    19 1152   BP: 110/60   Pulse: 72   Temp: 98 °F (36.7 °C)   SpO2: 98%       Medications:    Current Outpatient Prescriptions:     Current Outpatient Medications:   •  calcium carbonate (OS-MARCO A) 600 MG tablet, Take 600 mg by mouth Daily., Disp: , Rfl:   •  Probiotic Product (PROBIOTIC ACIDOPHILUS BEADS) capsule, Take  by mouth., Disp: , Rfl:   •  fluocinonide (LIDEX) 0.05 % ointment, , Disp: , Rfl:   •  fluticasone (FLONASE) 50 MCG/ACT nasal spray, 2 sprays into each nostril Daily., Disp: , Rfl:   •  loratadine (CLARITIN) 10 MG tablet, Take 10 mg by mouth Daily., Disp: , Rfl:   •  Multiple Vitamins-Minerals (WOMENS MULTIVITAMIN PLUS PO), Take  by mouth Daily., Disp: , Rfl:   •  Vitamin D, Cholecalciferol, 1000 units  capsule, Take 1,000 Units by mouth Daily., Disp: , Rfl:     Physical Examination:  General Appearance:   Patient is in no distress.  She is well kept and has an average build.   Psychiatric:  Patient with appropriate mood and affect. Alert and oriented to self, time, and place.    Breast, RIGHT:  small sized, symmetric with the contralateral side.  Breast skin is without erythema, edema, rashes.   There is bruising in the 3 o'clock position.  There are no visible abnormalities upon inspection during the arm-raising maneuver or with hands on hips in the sitting position. There is no nipple retraction, discharge or nipple/areolar skin changes.There is a small biopsy scar in the 3 o'clock position with a somewhat firm masslike area measuring about 1 cm just lateral to this.  I think it represents biopsy changes    Breast, LEFT:  small sized, symmetric with the contralateral side.  Breast skin is without erythema, edema, rashes.  There are no visible abnormalities upon inspection during the arm-raising maneuver or with hands on hips in the sitting position. There is no nipple retraction, discharge or nipple/areolar skin changes.There are no masses palpable in the sitting or supine positions.    Lymphatic:  There is no axillary, cervical, infraclavicular, or supraclavicular adenopathy bilaterally.  Eyes:  Pupils are round and reactive to light.  Cardiovascular:  Heart rate and rhythm are regular.  Respiratory:  Lungs are clear bilaterally with no crackles or wheezes in any lung field.  Gastrointestinal:  Abdomen is soft, nondistended, and nontender.  There was no evidence of hepatosplenomegaly or abdominal mass.  There are no scars from previous surgery.    Musculoskeletal:  Good strength in all 4 extremities.   There is good range of motion in both shoulders.    Skin:  No new skin lesions or rashes on the skin excluding the breast (see breast exam above).    Assessment:  1. Atypical ductal hyperplasia of right breast           Plan:  We had a nice discussion today regarding calcifications and atypical duct hyperplasia.We discussed that atypical hyperplasia, LCIS and family history are the 3 strongest risk factors for the development  of breast cancer outside of a known genetic predisposition. We discussed that for atypical hyperplasia that we recommend excision to ensure no pathologic upgrade and also to remove any remaining abnormal cells. We discussed that for a Caterina risk > 1.7, that we recommend consultation with medical oncology about risks and benefits of hormonal blockade. We discussed that for a risk (using a model that incorporates detailed family history) > 20% that we recommend MRI for surveillance.   Her Caterina 5 year risk is-4.7%  Her lifetime risk of breast cancer using the Tyrer Cuzick model is-25.9%  Based on the above, I have recommended the following: We will proceed with a needle localized excisional biopsy.  If there is no malignancy detected, we will send her to the medical oncologists to discuss possible risk reducing measures.  Based on these numbers, she would be a candidate for alternating mammograms and MRI as well as twice yearly physical examination.      CPT coding:    Next Appointment:  No Follow-up on file.            EMR Dragon/transcription disclaimer:    Much of this encounter note is an electronic transcription/translocation of spoken language to printed text.  The electronic translation of spoken language may permit erroneous, or at times, nonsensical words or phrases to be inadvertently transcribed.  Although I have reviewed the note from such areas, some may still exist.

## 2019-04-15 PROBLEM — N60.91 ATYPICAL DUCTAL HYPERPLASIA OF RIGHT BREAST: Status: ACTIVE | Noted: 2019-04-15

## 2019-04-19 ENCOUNTER — APPOINTMENT (OUTPATIENT)
Dept: PREADMISSION TESTING | Facility: HOSPITAL | Age: 59
End: 2019-04-19

## 2019-04-19 VITALS
HEIGHT: 65 IN | BODY MASS INDEX: 23.99 KG/M2 | SYSTOLIC BLOOD PRESSURE: 124 MMHG | RESPIRATION RATE: 16 BRPM | DIASTOLIC BLOOD PRESSURE: 76 MMHG | HEART RATE: 79 BPM | OXYGEN SATURATION: 100 % | WEIGHT: 144 LBS

## 2019-04-19 LAB
ANION GAP SERPL CALCULATED.3IONS-SCNC: 11 MMOL/L
BUN BLD-MCNC: 18 MG/DL (ref 6–20)
BUN/CREAT SERPL: 22.8 (ref 7–25)
CALCIUM SPEC-SCNC: 9.3 MG/DL (ref 8.6–10.5)
CHLORIDE SERPL-SCNC: 100 MMOL/L (ref 98–107)
CO2 SERPL-SCNC: 28 MMOL/L (ref 22–29)
CREAT BLD-MCNC: 0.79 MG/DL (ref 0.57–1)
DEPRECATED RDW RBC AUTO: 38.1 FL (ref 37–54)
ERYTHROCYTE [DISTWIDTH] IN BLOOD BY AUTOMATED COUNT: 11.7 % (ref 12.3–15.4)
GFR SERPL CREATININE-BSD FRML MDRD: 75 ML/MIN/1.73
GLUCOSE BLD-MCNC: 104 MG/DL (ref 65–99)
HCT VFR BLD AUTO: 43.5 % (ref 34–46.6)
HGB BLD-MCNC: 14 G/DL (ref 12–15.9)
MCH RBC QN AUTO: 28.9 PG (ref 26.6–33)
MCHC RBC AUTO-ENTMCNC: 32.2 G/DL (ref 31.5–35.7)
MCV RBC AUTO: 89.7 FL (ref 79–97)
PLATELET # BLD AUTO: 243 10*3/MM3 (ref 140–450)
PMV BLD AUTO: 10.5 FL (ref 6–12)
POTASSIUM BLD-SCNC: 3.5 MMOL/L (ref 3.5–5.2)
RBC # BLD AUTO: 4.85 10*6/MM3 (ref 3.77–5.28)
SODIUM BLD-SCNC: 139 MMOL/L (ref 136–145)
WBC NRBC COR # BLD: 6.6 10*3/MM3 (ref 3.4–10.8)

## 2019-04-19 PROCEDURE — 80048 BASIC METABOLIC PNL TOTAL CA: CPT | Performed by: SURGERY

## 2019-04-19 PROCEDURE — 85027 COMPLETE CBC AUTOMATED: CPT | Performed by: SURGERY

## 2019-04-19 PROCEDURE — 36415 COLL VENOUS BLD VENIPUNCTURE: CPT

## 2019-04-19 RX ORDER — LANOLIN ALCOHOL/MO/W.PET/CERES
3 CREAM (GRAM) TOPICAL NIGHTLY PRN
COMMUNITY

## 2019-04-19 NOTE — DISCHARGE INSTRUCTIONS
Take the following medications the morning of surgery with a small sip of water:  NONE    PLEASE ARRIVE AT THE HOSPITAL AT 10 AM ON April 29, 2019    General Instructions:  • Do not eat solid food after midnight the night before surgery.  • You may drink clear liquids day of surgery but must stop at least one hour before your hospital arrival time.  • It is beneficial for you to have a clear drink that contains carbohydrates the day of surgery.  We suggest a 12 to 20 ounce bottle of Gatorade or Powerade for non-diabetic patients or a 12 to 20 ounce bottle of G2 or Powerade Zero for diabetic patients. (Pediatric patients, are not advised to drink a 12 to 20 ounce carbohydrate drink)    Clear liquids are liquids you can see through.  Nothing red in color.     Plain water                               Sports drinks  Sodas                                   Gelatin (Jell-O)  Fruit juices without pulp such as white grape juice and apple juice  Popsicles that contain no fruit or yogurt  Tea or coffee (no cream or milk added)  Gatorade / Powerade  G2 / Powerade Zero    • Infants may have breast milk up to four hours before surgery.  • Infants drinking formula may drink formula up to six hours before surgery.   • Patients who avoid smoking, chewing tobacco and alcohol for 4 weeks prior to surgery have a reduced risk of post-operative complications.  Quit smoking as many days before surgery as you can.  • Do not smoke, use chewing tobacco or drink alcohol the day of surgery.   • If applicable bring your C-PAP/ BI-PAP machine.  • Bring any papers given to you in the doctor’s office.  • Wear clean comfortable clothes and socks.  • Do not wear contact lenses, false eyelashes or make-up.  Bring a case for your glasses.   • Bring crutches or walker if applicable.  • Remove all piercings.  Leave jewelry and any other valuables at home.  • Hair extensions with metal clips must be removed prior to surgery.  • The Pre-Admission  Testing nurse will instruct you to bring medications if unable to obtain an accurate list in Pre-Admission Testing.      Preventing a Surgical Site Infection:  • For 2 to 3 days before surgery, avoid shaving with a razor because the razor can irritate skin and make it easier to develop an infection.    • Any areas of open skin can increase the risk of a post-operative wound infection by allowing bacteria to enter and travel throughout the body.  Notify your surgeon if you have any skin wounds / rashes even if it is not near the expected surgical site.  The area will need assessed to determine if surgery should be delayed until it is healed.  • The night prior to surgery sleep in a clean bed with clean clothing.  Do not allow pets to sleep with you.  • Shower on the morning of surgery using a fresh bar of anti-bacterial soap (such as Dial) and clean washcloth.  Dry with a clean towel and dress in clean clothing.  • Ask your surgeon if you will be receiving antibiotics prior to surgery.  • Make sure you, your family, and all healthcare providers clean their hands with soap and water or an alcohol based hand  before caring for you or your wound.    Day of surgery:  Upon arrival, a Pre-op nurse and Anesthesiologist will review your health history, obtain vital signs, and answer questions you may have.  The only belongings needed at this time will be your home medications and if applicable your C-PAP/BI-PAP machine.  If you are staying overnight your family can leave the rest of your belongings in the car and bring them to your room later.  A Pre-op nurse will start an IV and you may receive medication in preparation for surgery, including something to help you relax.  Your family will be able to see you in the Pre-op area.  While you are in surgery your family should notify the waiting room  if they leave the waiting room area and provide a contact phone number.    Please be aware that surgery does  come with discomfort.  We want to make every effort to control your discomfort so please discuss any uncontrolled symptoms with your nurse.   Your doctor will most likely have prescribed pain medications.      If you are going home after surgery you will receive individualized written care instructions before being discharged.  A responsible adult must drive you to and from the hospital on the day of your surgery and stay with you for 24 hours.    If you are staying overnight following surgery, you will be transported to your hospital room following the recovery period.  [facility] has all private rooms.    You have received a list of surgical assistants for your reference.  If you have any questions please call Pre-Admission Testing at [phone].  Deductibles and co-payments are collected on the day of service. Please be prepared to pay the required co-pay, deductible or deposit on the day of service as defined by your plan.

## 2019-04-29 ENCOUNTER — ANESTHESIA EVENT (OUTPATIENT)
Dept: PERIOP | Facility: HOSPITAL | Age: 59
End: 2019-04-29

## 2019-04-29 ENCOUNTER — APPOINTMENT (OUTPATIENT)
Dept: GENERAL RADIOLOGY | Facility: HOSPITAL | Age: 59
End: 2019-04-29

## 2019-04-29 ENCOUNTER — HOSPITAL ENCOUNTER (OUTPATIENT)
Facility: HOSPITAL | Age: 59
Setting detail: HOSPITAL OUTPATIENT SURGERY
Discharge: HOME | End: 2019-04-29
Attending: SURGERY | Admitting: SURGERY

## 2019-04-29 ENCOUNTER — HOSPITAL ENCOUNTER (OUTPATIENT)
Dept: MAMMOGRAPHY | Facility: HOSPITAL | Age: 59
Discharge: HOME | End: 2019-04-29

## 2019-04-29 ENCOUNTER — ANESTHESIA (OUTPATIENT)
Dept: PERIOP | Facility: HOSPITAL | Age: 59
End: 2019-04-29

## 2019-04-29 VITALS
RESPIRATION RATE: 16 BRPM | HEART RATE: 80 BPM | OXYGEN SATURATION: 100 % | SYSTOLIC BLOOD PRESSURE: 122 MMHG | DIASTOLIC BLOOD PRESSURE: 65 MMHG | TEMPERATURE: 99 F

## 2019-04-29 DIAGNOSIS — N60.91 ATYPICAL DUCTAL HYPERPLASIA OF RIGHT BREAST: ICD-10-CM

## 2019-04-29 DIAGNOSIS — C80.1 CANCER (HCC): ICD-10-CM

## 2019-04-29 PROCEDURE — 88342 IMHCHEM/IMCYTCHM 1ST ANTB: CPT | Performed by: SURGERY

## 2019-04-29 PROCEDURE — 19125 EXCISION BREAST LESION: CPT | Performed by: SURGERY

## 2019-04-29 PROCEDURE — 25010000002 ONDANSETRON PER 1 MG: Performed by: NURSE ANESTHETIST, CERTIFIED REGISTERED

## 2019-04-29 PROCEDURE — 25010000002 FENTANYL CITRATE (PF) 100 MCG/2ML SOLUTION: Performed by: ANESTHESIOLOGY

## 2019-04-29 PROCEDURE — 88307 TISSUE EXAM BY PATHOLOGIST: CPT | Performed by: SURGERY

## 2019-04-29 PROCEDURE — 25010000002 KETOROLAC TROMETHAMINE PER 15 MG: Performed by: NURSE ANESTHETIST, CERTIFIED REGISTERED

## 2019-04-29 PROCEDURE — 25010000002 PROPOFOL 10 MG/ML EMULSION: Performed by: NURSE ANESTHETIST, CERTIFIED REGISTERED

## 2019-04-29 PROCEDURE — 76098 X-RAY EXAM SURGICAL SPECIMEN: CPT

## 2019-04-29 PROCEDURE — 25010000003 CEFAZOLIN IN DEXTROSE 2-4 GM/100ML-% SOLUTION: Performed by: SURGERY

## 2019-04-29 PROCEDURE — 25010000002 DEXAMETHASONE PER 1 MG: Performed by: NURSE ANESTHETIST, CERTIFIED REGISTERED

## 2019-04-29 RX ORDER — NALOXONE HCL 0.4 MG/ML
0.2 VIAL (ML) INJECTION AS NEEDED
Status: DISCONTINUED | OUTPATIENT
Start: 2019-04-29 | End: 2019-04-29 | Stop reason: HOSPADM

## 2019-04-29 RX ORDER — FENTANYL CITRATE 50 UG/ML
50 INJECTION, SOLUTION INTRAMUSCULAR; INTRAVENOUS
Status: CANCELLED | OUTPATIENT
Start: 2019-04-29

## 2019-04-29 RX ORDER — FENTANYL CITRATE 50 UG/ML
50 INJECTION, SOLUTION INTRAMUSCULAR; INTRAVENOUS
Status: DISCONTINUED | OUTPATIENT
Start: 2019-04-29 | End: 2019-04-29 | Stop reason: HOSPADM

## 2019-04-29 RX ORDER — OXYCODONE AND ACETAMINOPHEN 7.5; 325 MG/1; MG/1
1 TABLET ORAL ONCE AS NEEDED
Status: DISCONTINUED | OUTPATIENT
Start: 2019-04-29 | End: 2019-04-29 | Stop reason: HOSPADM

## 2019-04-29 RX ORDER — PROMETHAZINE HYDROCHLORIDE 25 MG/1
25 TABLET ORAL ONCE AS NEEDED
Status: CANCELLED | OUTPATIENT
Start: 2019-04-29

## 2019-04-29 RX ORDER — HYDROCODONE BITARTRATE AND ACETAMINOPHEN 5; 325 MG/1; MG/1
1-2 TABLET ORAL EVERY 4 HOURS PRN
Qty: 5 TABLET | Refills: 0 | Status: SHIPPED | OUTPATIENT
Start: 2019-04-29 | End: 2019-06-07

## 2019-04-29 RX ORDER — LIDOCAINE HYDROCHLORIDE 20 MG/ML
INJECTION, SOLUTION INFILTRATION; PERINEURAL AS NEEDED
Status: DISCONTINUED | OUTPATIENT
Start: 2019-04-29 | End: 2019-04-29 | Stop reason: SURG

## 2019-04-29 RX ORDER — DEXAMETHASONE SODIUM PHOSPHATE 10 MG/ML
INJECTION INTRAMUSCULAR; INTRAVENOUS AS NEEDED
Status: DISCONTINUED | OUTPATIENT
Start: 2019-04-29 | End: 2019-04-29 | Stop reason: SURG

## 2019-04-29 RX ORDER — PROMETHAZINE HYDROCHLORIDE 25 MG/ML
6.25 INJECTION, SOLUTION INTRAMUSCULAR; INTRAVENOUS ONCE AS NEEDED
Status: CANCELLED | OUTPATIENT
Start: 2019-04-29

## 2019-04-29 RX ORDER — SODIUM CHLORIDE 0.9 % (FLUSH) 0.9 %
1-10 SYRINGE (ML) INJECTION AS NEEDED
Status: DISCONTINUED | OUTPATIENT
Start: 2019-04-29 | End: 2019-04-29 | Stop reason: HOSPADM

## 2019-04-29 RX ORDER — DIPHENHYDRAMINE HCL 25 MG
25 CAPSULE ORAL
Status: DISCONTINUED | OUTPATIENT
Start: 2019-04-29 | End: 2019-04-29 | Stop reason: HOSPADM

## 2019-04-29 RX ORDER — HYDROCODONE BITARTRATE AND ACETAMINOPHEN 7.5; 325 MG/1; MG/1
1 TABLET ORAL ONCE AS NEEDED
Status: COMPLETED | OUTPATIENT
Start: 2019-04-29 | End: 2019-04-29

## 2019-04-29 RX ORDER — KETOROLAC TROMETHAMINE 30 MG/ML
INJECTION, SOLUTION INTRAMUSCULAR; INTRAVENOUS AS NEEDED
Status: DISCONTINUED | OUTPATIENT
Start: 2019-04-29 | End: 2019-04-29 | Stop reason: SURG

## 2019-04-29 RX ORDER — EPHEDRINE SULFATE 50 MG/ML
5 INJECTION, SOLUTION INTRAVENOUS ONCE AS NEEDED
Status: CANCELLED | OUTPATIENT
Start: 2019-04-29

## 2019-04-29 RX ORDER — ONDANSETRON 2 MG/ML
4 INJECTION INTRAMUSCULAR; INTRAVENOUS ONCE AS NEEDED
Status: CANCELLED | OUTPATIENT
Start: 2019-04-29

## 2019-04-29 RX ORDER — FLUMAZENIL 0.1 MG/ML
0.2 INJECTION INTRAVENOUS AS NEEDED
Status: DISCONTINUED | OUTPATIENT
Start: 2019-04-29 | End: 2019-04-29 | Stop reason: HOSPADM

## 2019-04-29 RX ORDER — HYDROMORPHONE HYDROCHLORIDE 1 MG/ML
0.5 INJECTION, SOLUTION INTRAMUSCULAR; INTRAVENOUS; SUBCUTANEOUS
Status: CANCELLED | OUTPATIENT
Start: 2019-04-29

## 2019-04-29 RX ORDER — FLUMAZENIL 0.1 MG/ML
0.2 INJECTION INTRAVENOUS AS NEEDED
Status: CANCELLED | OUTPATIENT
Start: 2019-04-29

## 2019-04-29 RX ORDER — LIDOCAINE HYDROCHLORIDE 10 MG/ML
3 INJECTION, SOLUTION INFILTRATION; PERINEURAL ONCE
Status: COMPLETED | OUTPATIENT
Start: 2019-04-29 | End: 2019-04-29

## 2019-04-29 RX ORDER — PROMETHAZINE HYDROCHLORIDE 25 MG/1
25 SUPPOSITORY RECTAL ONCE AS NEEDED
Status: CANCELLED | OUTPATIENT
Start: 2019-04-29

## 2019-04-29 RX ORDER — PROMETHAZINE HYDROCHLORIDE 25 MG/ML
12.5 INJECTION, SOLUTION INTRAMUSCULAR; INTRAVENOUS ONCE AS NEEDED
Status: DISCONTINUED | OUTPATIENT
Start: 2019-04-29 | End: 2019-04-29 | Stop reason: HOSPADM

## 2019-04-29 RX ORDER — GLYCOPYRROLATE 0.2 MG/ML
INJECTION INTRAMUSCULAR; INTRAVENOUS AS NEEDED
Status: DISCONTINUED | OUTPATIENT
Start: 2019-04-29 | End: 2019-04-29 | Stop reason: SURG

## 2019-04-29 RX ORDER — EPHEDRINE SULFATE 50 MG/ML
INJECTION, SOLUTION INTRAVENOUS AS NEEDED
Status: DISCONTINUED | OUTPATIENT
Start: 2019-04-29 | End: 2019-04-29 | Stop reason: SURG

## 2019-04-29 RX ORDER — DIAZEPAM 5 MG/1
10 TABLET ORAL ONCE
Status: COMPLETED | OUTPATIENT
Start: 2019-04-29 | End: 2019-04-29

## 2019-04-29 RX ORDER — SODIUM CHLORIDE 0.9 % (FLUSH) 0.9 %
3 SYRINGE (ML) INJECTION EVERY 12 HOURS SCHEDULED
Status: DISCONTINUED | OUTPATIENT
Start: 2019-04-29 | End: 2019-04-29 | Stop reason: HOSPADM

## 2019-04-29 RX ORDER — HYDROCODONE BITARTRATE AND ACETAMINOPHEN 7.5; 325 MG/1; MG/1
1 TABLET ORAL ONCE AS NEEDED
Status: CANCELLED | OUTPATIENT
Start: 2019-04-29

## 2019-04-29 RX ORDER — PROMETHAZINE HYDROCHLORIDE 25 MG/1
25 TABLET ORAL ONCE AS NEEDED
Status: DISCONTINUED | OUTPATIENT
Start: 2019-04-29 | End: 2019-04-29 | Stop reason: HOSPADM

## 2019-04-29 RX ORDER — BUPIVACAINE HYDROCHLORIDE AND EPINEPHRINE 2.5; 5 MG/ML; UG/ML
INJECTION, SOLUTION INFILTRATION; PERINEURAL AS NEEDED
Status: DISCONTINUED | OUTPATIENT
Start: 2019-04-29 | End: 2019-04-29 | Stop reason: HOSPADM

## 2019-04-29 RX ORDER — PROMETHAZINE HYDROCHLORIDE 25 MG/1
25 SUPPOSITORY RECTAL ONCE AS NEEDED
Status: DISCONTINUED | OUTPATIENT
Start: 2019-04-29 | End: 2019-04-29 | Stop reason: HOSPADM

## 2019-04-29 RX ORDER — CEFAZOLIN SODIUM 2 G/100ML
2 INJECTION, SOLUTION INTRAVENOUS ONCE
Status: COMPLETED | OUTPATIENT
Start: 2019-04-29 | End: 2019-04-29

## 2019-04-29 RX ORDER — HYDRALAZINE HYDROCHLORIDE 20 MG/ML
5 INJECTION INTRAMUSCULAR; INTRAVENOUS
Status: DISCONTINUED | OUTPATIENT
Start: 2019-04-29 | End: 2019-04-29 | Stop reason: HOSPADM

## 2019-04-29 RX ORDER — MAGNESIUM HYDROXIDE 1200 MG/15ML
LIQUID ORAL AS NEEDED
Status: DISCONTINUED | OUTPATIENT
Start: 2019-04-29 | End: 2019-04-29 | Stop reason: HOSPADM

## 2019-04-29 RX ORDER — SODIUM CHLORIDE, SODIUM LACTATE, POTASSIUM CHLORIDE, CALCIUM CHLORIDE 600; 310; 30; 20 MG/100ML; MG/100ML; MG/100ML; MG/100ML
9 INJECTION, SOLUTION INTRAVENOUS CONTINUOUS
Status: DISCONTINUED | OUTPATIENT
Start: 2019-04-29 | End: 2019-04-29 | Stop reason: HOSPADM

## 2019-04-29 RX ORDER — LABETALOL HYDROCHLORIDE 5 MG/ML
5 INJECTION, SOLUTION INTRAVENOUS
Status: DISCONTINUED | OUTPATIENT
Start: 2019-04-29 | End: 2019-04-29 | Stop reason: HOSPADM

## 2019-04-29 RX ORDER — ACETAMINOPHEN 650 MG/1
650 SUPPOSITORY RECTAL ONCE AS NEEDED
Status: DISCONTINUED | OUTPATIENT
Start: 2019-04-29 | End: 2019-04-29 | Stop reason: HOSPADM

## 2019-04-29 RX ORDER — DIPHENHYDRAMINE HYDROCHLORIDE 50 MG/ML
12.5 INJECTION INTRAMUSCULAR; INTRAVENOUS
Status: DISCONTINUED | OUTPATIENT
Start: 2019-04-29 | End: 2019-04-29 | Stop reason: HOSPADM

## 2019-04-29 RX ORDER — ONDANSETRON 2 MG/ML
4 INJECTION INTRAMUSCULAR; INTRAVENOUS ONCE AS NEEDED
Status: DISCONTINUED | OUTPATIENT
Start: 2019-04-29 | End: 2019-04-29 | Stop reason: HOSPADM

## 2019-04-29 RX ORDER — ACETAMINOPHEN 325 MG/1
650 TABLET ORAL ONCE
Status: COMPLETED | OUTPATIENT
Start: 2019-04-29 | End: 2019-04-29

## 2019-04-29 RX ORDER — OXYCODONE AND ACETAMINOPHEN 7.5; 325 MG/1; MG/1
1 TABLET ORAL EVERY 4 HOURS PRN
Status: DISCONTINUED | OUTPATIENT
Start: 2019-04-29 | End: 2019-04-29 | Stop reason: HOSPADM

## 2019-04-29 RX ORDER — LIDOCAINE HYDROCHLORIDE 10 MG/ML
0.5 INJECTION, SOLUTION EPIDURAL; INFILTRATION; INTRACAUDAL; PERINEURAL ONCE AS NEEDED
Status: DISCONTINUED | OUTPATIENT
Start: 2019-04-29 | End: 2019-04-29 | Stop reason: HOSPADM

## 2019-04-29 RX ORDER — FENTANYL CITRATE 50 UG/ML
100 INJECTION, SOLUTION INTRAMUSCULAR; INTRAVENOUS
Status: DISCONTINUED | OUTPATIENT
Start: 2019-04-29 | End: 2019-04-29 | Stop reason: HOSPADM

## 2019-04-29 RX ORDER — ONDANSETRON 2 MG/ML
INJECTION INTRAMUSCULAR; INTRAVENOUS AS NEEDED
Status: DISCONTINUED | OUTPATIENT
Start: 2019-04-29 | End: 2019-04-29 | Stop reason: SURG

## 2019-04-29 RX ORDER — MIDAZOLAM HYDROCHLORIDE 1 MG/ML
2 INJECTION INTRAMUSCULAR; INTRAVENOUS
Status: DISCONTINUED | OUTPATIENT
Start: 2019-04-29 | End: 2019-04-29 | Stop reason: HOSPADM

## 2019-04-29 RX ORDER — FAMOTIDINE 10 MG/ML
20 INJECTION, SOLUTION INTRAVENOUS ONCE
Status: COMPLETED | OUTPATIENT
Start: 2019-04-29 | End: 2019-04-29

## 2019-04-29 RX ORDER — PROPOFOL 10 MG/ML
VIAL (ML) INTRAVENOUS AS NEEDED
Status: DISCONTINUED | OUTPATIENT
Start: 2019-04-29 | End: 2019-04-29 | Stop reason: SURG

## 2019-04-29 RX ORDER — EPHEDRINE SULFATE 50 MG/ML
5 INJECTION, SOLUTION INTRAVENOUS ONCE AS NEEDED
Status: DISCONTINUED | OUTPATIENT
Start: 2019-04-29 | End: 2019-04-29 | Stop reason: HOSPADM

## 2019-04-29 RX ORDER — MIDAZOLAM HYDROCHLORIDE 1 MG/ML
1 INJECTION INTRAMUSCULAR; INTRAVENOUS
Status: DISCONTINUED | OUTPATIENT
Start: 2019-04-29 | End: 2019-04-29 | Stop reason: HOSPADM

## 2019-04-29 RX ORDER — HYDROMORPHONE HYDROCHLORIDE 1 MG/ML
0.5 INJECTION, SOLUTION INTRAMUSCULAR; INTRAVENOUS; SUBCUTANEOUS
Status: DISCONTINUED | OUTPATIENT
Start: 2019-04-29 | End: 2019-04-29 | Stop reason: HOSPADM

## 2019-04-29 RX ORDER — ACETAMINOPHEN 325 MG/1
650 TABLET ORAL ONCE AS NEEDED
Status: DISCONTINUED | OUTPATIENT
Start: 2019-04-29 | End: 2019-04-29 | Stop reason: HOSPADM

## 2019-04-29 RX ADMIN — EPHEDRINE SULFATE 10 MG: 50 INJECTION INTRAMUSCULAR; INTRAVENOUS; SUBCUTANEOUS at 14:07

## 2019-04-29 RX ADMIN — DEXAMETHASONE SODIUM PHOSPHATE 8 MG: 10 INJECTION INTRAMUSCULAR; INTRAVENOUS at 14:06

## 2019-04-29 RX ADMIN — HYDROCODONE BITARTRATE AND ACETAMINOPHEN 1 TABLET: 7.5; 325 TABLET ORAL at 15:14

## 2019-04-29 RX ADMIN — CEFAZOLIN SODIUM 2 G: 2 INJECTION, SOLUTION INTRAVENOUS at 14:03

## 2019-04-29 RX ADMIN — PROPOFOL 200 MG: 10 INJECTION, EMULSION INTRAVENOUS at 14:03

## 2019-04-29 RX ADMIN — GLYCOPYRROLATE 0.2 MG: 0.2 INJECTION INTRAMUSCULAR; INTRAVENOUS at 14:03

## 2019-04-29 RX ADMIN — FENTANYL CITRATE 50 MCG: 50 INJECTION INTRAMUSCULAR; INTRAVENOUS at 14:38

## 2019-04-29 RX ADMIN — EPHEDRINE SULFATE 10 MG: 50 INJECTION INTRAMUSCULAR; INTRAVENOUS; SUBCUTANEOUS at 14:22

## 2019-04-29 RX ADMIN — DIAZEPAM 10 MG: 5 TABLET ORAL at 10:58

## 2019-04-29 RX ADMIN — SODIUM CHLORIDE, POTASSIUM CHLORIDE, SODIUM LACTATE AND CALCIUM CHLORIDE 9 ML/HR: 600; 310; 30; 20 INJECTION, SOLUTION INTRAVENOUS at 13:01

## 2019-04-29 RX ADMIN — EPHEDRINE SULFATE 10 MG: 50 INJECTION INTRAMUSCULAR; INTRAVENOUS; SUBCUTANEOUS at 14:27

## 2019-04-29 RX ADMIN — ONDANSETRON 4 MG: 2 INJECTION INTRAMUSCULAR; INTRAVENOUS at 14:06

## 2019-04-29 RX ADMIN — FENTANYL CITRATE 100 MCG: 50 INJECTION INTRAMUSCULAR; INTRAVENOUS at 14:00

## 2019-04-29 RX ADMIN — LIDOCAINE HYDROCHLORIDE 40 MG: 20 INJECTION, SOLUTION INFILTRATION; PERINEURAL at 14:03

## 2019-04-29 RX ADMIN — LIDOCAINE HYDROCHLORIDE 3 ML: 10 INJECTION, SOLUTION INFILTRATION; PERINEURAL at 12:30

## 2019-04-29 RX ADMIN — KETOROLAC TROMETHAMINE 15 MG: 30 INJECTION, SOLUTION INTRAMUSCULAR; INTRAVENOUS at 14:06

## 2019-04-29 RX ADMIN — ACETAMINOPHEN 650 MG: 325 TABLET, FILM COATED ORAL at 10:58

## 2019-04-29 RX ADMIN — FAMOTIDINE 20 MG: 10 INJECTION INTRAVENOUS at 13:01

## 2019-04-29 NOTE — ANESTHESIA PREPROCEDURE EVALUATION
Anesthesia Evaluation     Patient summary reviewed and Nursing notes reviewed   NPO Solid Status: > 8 hours             Airway   Mallampati: II  TM distance: >3 FB  Neck ROM: full  no difficulty expected  Dental - normal exam     Pulmonary - negative pulmonary ROS and normal exam   Cardiovascular - negative cardio ROS and normal exam        Neuro/Psych- negative ROS  GI/Hepatic/Renal/Endo - negative ROS     Musculoskeletal (-) negative ROS    Abdominal  - normal exam   Substance History - negative use     OB/GYN negative ob/gyn ROS         Other        ROS/Med Hx Other: Peripheral neuropathy vikash feet                Anesthesia Plan    ASA 2     general     intravenous induction   Anesthetic plan, all risks, benefits, and alternatives have been provided, discussed and informed consent has been obtained with: patient.    Plan discussed with CRNA.

## 2019-04-29 NOTE — ANESTHESIA PROCEDURE NOTES
Airway  Urgency: elective    Airway not difficult    General Information and Staff    Patient location during procedure: OR  Anesthesiologist: Felipe Cox MD  CRNA: Thi Barlow CRNA    Indications and Patient Condition  Indications for airway management: airway protection    Preoxygenated: yes  Mask difficulty assessment: 1 - vent by mask    Final Airway Details  Final airway type: supraglottic airway      Successful airway: unique  Size 4    Number of attempts at approach: 1    Additional Comments  Smooth IV/mask induction and placement of LMA. Atraumatic, lips/teeth/mouth intact, as preop. +ETCO2, bilateral breath sounds and equal.

## 2019-04-29 NOTE — ANESTHESIA POSTPROCEDURE EVALUATION
Patient: Aleja Garcia    Procedure Summary     Date:  04/29/19 Room / Location:   [facility]    Anesthesia Start:  1357 Anesthesia Stop:  1452    Procedure:  BREAST BIOPSY WITH NEEDLE LOCALIZATION (Right Breast) Diagnosis:       Atypical ductal hyperplasia of right breast      (Atypical ductal hyperplasia of right breast [N60.91])    Surgeon:  Andrew Pham MD Provider:  Brooke, Felipe Carty MD    Anesthesia Type:  general ASA Status:  2          Anesthesia Type: general  Last vitals  BP   122/65 (04/29/19 1546)   Temp   37.2 °C (99 °F) (04/29/19 1530)   Pulse   80 (04/29/19 1546)   Resp   16 (04/29/19 1546)     SpO2   100 % (04/29/19 1546)     Post Anesthesia Care and Evaluation    Patient location during evaluation: bedside  Patient participation: complete - patient participated  Level of consciousness: awake and alert  Pain management: adequate  Airway patency: patent  Anesthetic complications: No anesthetic complications    Cardiovascular status: acceptable  Respiratory status: acceptable  Hydration status: acceptable    Comments: /65   Pulse 80   Temp 37.2 °C (99 °F) (Oral)   Resp 16   LMP 03/07/2012 Comment: HRT  SpO2 100%

## 2019-05-01 ENCOUNTER — TELEPHONE (OUTPATIENT)
Dept: MAMMOGRAPHY | Facility: CLINIC | Age: 59
End: 2019-05-01

## 2019-05-01 LAB
CYTO UR: NORMAL
LAB AP CASE REPORT: NORMAL
LAB AP SPECIAL STAINS: NORMAL
PATH REPORT.FINAL DX SPEC: NORMAL
PATH REPORT.GROSS SPEC: NORMAL

## 2019-05-13 ENCOUNTER — OFFICE VISIT (OUTPATIENT)
Dept: MAMMOGRAPHY | Facility: CLINIC | Age: 59
End: 2019-05-13

## 2019-05-13 VITALS
HEART RATE: 71 BPM | OXYGEN SATURATION: 96 % | TEMPERATURE: 98.7 F | DIASTOLIC BLOOD PRESSURE: 78 MMHG | SYSTOLIC BLOOD PRESSURE: 120 MMHG

## 2019-05-13 DIAGNOSIS — N60.91 ATYPICAL DUCTAL HYPERPLASIA OF RIGHT BREAST: Primary | ICD-10-CM

## 2019-05-13 PROCEDURE — 99024 POSTOP FOLLOW-UP VISIT: CPT | Performed by: SURGERY

## 2019-05-13 NOTE — PROGRESS NOTES
Chief Complaint: Aleja Garcia is a  58 y.o. female, initially referred by No ref. provider found , who is here today for a postoperative visit.    History of Present Illness:  In the interim,Aleja Garcia has had the following procedure and resultant pathology report: She is undergone a needle localized lumpectomy for atypical duct hyperplasia.  There was no further atypical ductal hyperplasia in the breast but she did have a radial scar.    She has noted no redness, warmth,drainage, swelling at the incision site. Denies fever or chills.      Current Outpatient Medications:   •  calcium carbonate (OS-MARCO A) 600 MG tablet, Take 600 mg by mouth Daily., Disp: , Rfl:   •  fluocinonide (LIDEX) 0.05 % ointment, Apply  topically to the appropriate area as directed As Needed (psoriasis)., Disp: , Rfl:   •  fluticasone (FLONASE) 50 MCG/ACT nasal spray, 2 sprays into each nostril Daily., Disp: , Rfl:   •  HYDROcodone-acetaminophen (NORCO) 5-325 MG per tablet, Take 1-2 tablets by mouth Every 4 (Four) Hours As Needed (Pain)., Disp: 5 tablet, Rfl: 0  •  loratadine (CLARITIN) 10 MG tablet, Take 10 mg by mouth Daily., Disp: , Rfl:   •  melatonin 3 MG tablet, Take 3 mg by mouth At Night As Needed for Sleep., Disp: , Rfl:   •  Multiple Vitamins-Minerals (WOMENS MULTIVITAMIN PLUS PO), Take 1 tablet by mouth Daily., Disp: , Rfl:   •  Probiotic Product (PROBIOTIC ACIDOPHILUS BEADS) capsule, Take 1 tablet by mouth Daily., Disp: , Rfl:   •  Vitamin D, Cholecalciferol, 1000 units capsule, Take 1,000 Units by mouth Daily., Disp: , Rfl:   Physical examination  Right breast-incision in the lower inner quadrant is healing nicely with some bruising in the typical amount of firmness but no signs of infection.  Assessment:  Atypical duct hyperplasia right breast- the patient is healing well from her surgery.  Her Caterina risk assessment is 4.7% at 5 years and her Tyrer Cuzick risk is 25.9% lifetime.    Plan:  I have placed orders for an MRI in 6  months.  We are also making arrangements for her to see the medical oncologist for possible medical risk reduction.          EMR Dragon/transcription disclaimer:    Much of this encounter note is an electronic transcription/translocation of spoken language to printed text.  The electronic translation of spoken language may permit erroneous, or at times, nonsensical words or phrases to be inadvertently transcribed.  Although I have reviewed the note from such areas, some may still exist.

## 2019-06-05 NOTE — PROGRESS NOTES
Subjective   Chief Complaint   Patient presents with   • Establish Care     NP-est care   • Allergic Rhinitis   • Breast Cancer   • Insomnia   • Peripheral Neuropathy       History of Present Illness     Patient is a 59 yo wf who presents as a new patient to establish care and discuss recent diagnosis of atypical ductal hyperplasia and .She was previously followed by Dr. Wiseman. She has been doing well over all but has had concerns given health changes with her parents. Her father was diagnosed with COPD. They lived in [facility] [place] a child where they were exposed to indoor smoke and smog. Her mother has a diagnosis of Alzheimer's at age 74 yo and is now 83 yo.     She had her last child at age 43 yo. She had a . She continued to have a lot of swelling after his birth. She has neuropathy in her feet and cant bend her toes forward. She was told that she is on her way to developing hammer toes. She doesn't worry about it too much any more. She was offered to take Neurontin but she declined as it didn't treat the underlying issue. She has had EMG and NCS testing twice both of which were normal. She has family members (dad and an aunt) with PD. She denies tremors but notes occasional imbalance which she attributes to her neuropathy.     She is currently on Flonase and Claritin for her allergies which she feels are well controlled. She takes these daily usually. Her worse season is over the winter. She has seen an ENT for this in the past but hasn't had formal testing with an allergist.     She takes OTC VD 1,000 IU for her history of VD deficiency and 600 mg of calcium carbonate daily for her bone health.     Her last MMG was in March. She recently had a breast biopsy with Dr. Pham in 2019 and was found to have atypical ductal hyperplasia of the right breast. She has since stopped her HRT. She will have an MRI in 6 months and saw Dr Llamas yesterday for possible medical risk reduction given  that her Caterina risk assessment is 4.7% and Tyrer-Cuzick lifetime risk is 25.9%. She was counseled regarding treatment with Tamoxifen or Evisita to start in 6 months after she has been off her HRT for six months and has a DEXA scan. She will have MRI of her breasts in 6 months.     She is off her HRT now and she has had night sweats. She is sleeping ok right now. She takes Melatonin 3 mg at night and this has helped her significantly.       She will have a DEXA scan in July. Her last P/P exam was 03/12/19 with Dr. Geiger; it was negative. Her last colonoscopy was 2012 with Dr. Celestino Mendenhall and needs to be repeated in 2022.    She had a mole removed with Dr. Ho at [facility] in dermatology; she will now see Dr. Farnsworth.SHe had to have two surgeries on this to get better margins    Patient Active Problem List   Diagnosis   • Insomnia   • Allergic rhinitis   • Peripheral neuropathy   • Vitamin D deficiency   • Atypical ductal hyperplasia of right breast       No Known Allergies    Current Outpatient Medications on File Prior to Visit   Medication Sig Dispense Refill   • calcium carbonate (OS-MARCO A) 600 MG tablet Take 600 mg by mouth Daily.     • fluocinonide (LIDEX) 0.05 % ointment Apply  topically to the appropriate area as directed As Needed (psoriasis).     • fluticasone (FLONASE) 50 MCG/ACT nasal spray 2 sprays into each nostril Daily.     • loratadine (CLARITIN) 10 MG tablet Take 10 mg by mouth Daily.     • melatonin 3 MG tablet Take 3 mg by mouth At Night As Needed for Sleep.     • Multiple Vitamins-Minerals (WOMENS MULTIVITAMIN PLUS PO) Take 1 tablet by mouth Daily.     • Probiotic Product (PROBIOTIC ACIDOPHILUS BEADS) capsule Take 1 tablet by mouth Daily.     • Vitamin D, Cholecalciferol, 1000 units capsule Take 1,000 Units by mouth Daily.     • [DISCONTINUED] HYDROcodone-acetaminophen (NORCO) 5-325 MG per tablet Take 1-2 tablets by mouth Every 4 (Four) Hours As Needed (Pain). 5 tablet 0     No current  "facility-administered medications on file prior to visit.        Past Medical History:   Diagnosis Date   • Allergic rhinitis 2017   • Benign mole    • Breast cancer (CMS/HCC) 2019    Right- pat states that she did not have cancer    • History of Papanicolaou smear of cervix     Never Had Dysplasia on a Pap Smear.  [id number], Normal Annual Pap Smears.  , Bx of a Small Granular Tuft of Tissue at 5:00 = Chronic Cervicitis and Sq Metaplasia.  , Neg Pap Smear, Neg HR-HPV.  , ASCUS, Neg HR-HPV.   • Miscarriage  and     2 spontaneous ABs, D&C for both.   • Night sweats     mild   • Post-menopause on HRT (hormone replacement therapy)     Continuously on HRT since about    • Psoriasis    • Vaginal delivery     x2   baby girl \"BROCK\"     baby girl \"SUNITHA\"   • Vitamin D deficiency 2017    Takes 1000 IU / day.       Family History   Problem Relation Age of Onset   • Dementia Mother 75        Alzheimers Diseasse.   • Cataracts Mother    • Hyperlipidemia Father    • Hypertension Father    • Heart valve disorder Father         valve repair    • COPD Father         non-smoker   • Cataracts Father    • Melanoma Father    • Seizures Sister 48   • Cataracts Maternal Grandmother    • Osteoarthritis Maternal Grandmother    • Osteoarthritis Paternal Grandmother    • No Known Problems Daughter    • Down syndrome Daughter    • Rheum arthritis Daughter         remission    • ADD / ADHD Son    • Other Son         Human Growth Hormone deficiency   • No Known Problems Brother         adopted    • No Known Problems Maternal Grandfather          in his 80's.   • No Known Problems Paternal Grandfather          at ~ 89.   • No Known Problems Sister    • Diabetes type II Maternal Aunt    • Diabetes Maternal Aunt    • Testicular cancer Maternal Uncle    • Heart attack Paternal Aunt    • BRCA 1/2 Neg Hx    • Breast cancer Neg Hx    • Colon cancer Neg Hx    • Endometrial cancer Neg Hx    • " "Ovarian cancer Neg Hx    • Malig Hyperthermia Neg Hx        Social History     Socioeconomic History   • Marital status:      Spouse name: JOSE D   • Number of children: 3   • Years of education: Not on file   • Highest education level: Not on file   Occupational History     Employer: [employer]   Tobacco Use   • Smoking status: Never Smoker   • Smokeless tobacco: Never Used   Substance and Sexual Activity   • Alcohol use: Yes     Frequency: Monthly or less     Comment: social    • Drug use: No   • Sexual activity: Yes     Partners: Male     Birth control/protection: Post-menopausal     Comment: SPOUSE =  JOSE D        Past Surgical History:   Procedure Laterality Date   • BREAST BIOPSY      Path= Negative. RIGHT   • BREAST BIOPSY Right 2019    Procedure: BREAST BIOPSY WITH NEEDLE LOCALIZATION;  Surgeon: Andrew Pham MD;  Location: [facility] [place] [facility] [place];  Service: General   •  SECTION      baby boy \"PETTY\" ; CS for placenta previa.   • COLONOSCOPY  [id number]    Normal @ 51. Repeat in ten years.   • DIAGNOSTIC LAPAROSCOPY      Dr. Fabian Al, hysteroscopy w/ separtion of minimal intrauterine septum.  Laparoscopy w/ CO2 laser, lysis of adhesions and ablation of endometriosis.  Endometriosis found in the lateral LEFT pelvic sidewall, peritoneum and deeper endometriosis of the LEFT and RIGHT uterosacral ligaments.  Complex superficial endometriosis of the LEFT and RIGHT ovary.Fallopian tubes were patent with methyleneblue   • DILATATION AND CURETTAGE  1998    x2       • MOLE REMOVAL  2019   • WISDOM TOOTH EXTRACTION         The following portions of the patient's history were reviewed and updated as appropriate: problem list, allergies, current medications, past medical history, past family history, past social history and past surgical history.    Review of Systems   Constitution: Negative.   HENT:        Sinus drainage.    Eyes:        Sees Dr. Arias., " "  Cardiovascular: Negative.    Respiratory: Negative.    Endocrine: Negative.    Skin:        H/o of suspicious mole--excised and is followed by [facility].    Musculoskeletal: Negative.    Gastrointestinal: Negative.    Genitourinary: Negative.    Neurological:        Peripheral neuropathy feet bilaterally.    Psychiatric/Behavioral: Negative.    Allergic/Immunologic: Positive for environmental allergies.       There is no immunization history for the selected administration types on file for this patient.    Objective   Vitals:    06/07/19 0759 06/07/19 0901   BP:  118/74   Pulse: 67    Temp: 98.7 °F (37.1 °C)    SpO2: 98%    Weight: 64 kg (141 lb)    Height: 161.3 cm (63.5\")      Physical Exam   Constitutional: She is oriented to person, place, and time. She appears well-developed and well-nourished.   HENT:   Head: Normocephalic and atraumatic.   Right Ear: External ear normal.   Left Ear: External ear normal.   Nose: Nose normal.   Mouth/Throat: Oropharynx is clear and moist.   Nasal turbinates slightly swollen with clear sinus drainage. +cobblestoning.    Eyes: Conjunctivae and EOM are normal. Pupils are equal, round, and reactive to light. No scleral icterus.   Neck: Neck supple. Carotid bruit is not present. No thyromegaly present.   Cardiovascular: Normal rate, regular rhythm, normal heart sounds and intact distal pulses.   No murmur heard.  Pulmonary/Chest: Effort normal and breath sounds normal.   Abdominal: Soft. Bowel sounds are normal. She exhibits no distension and no mass. There is no hepatosplenomegaly. There is no tenderness. There is no rebound.   Musculoskeletal:   Ambulates without assistance; no clubbing, cyanosis or edema.    Lymphadenopathy:     She has no cervical adenopathy.   Neurological: She is alert and oriented to person, place, and time.   Skin: Skin is warm and dry.   Psychiatric: She has a normal mood and affect.   Vitals reviewed.      Procedures    Assessment/Plan "   Aleja was seen today for establish care, allergic rhinitis, breast cancer, insomnia and peripheral neuropathy.    Diagnoses and all orders for this visit:    Atypical ductal hyperplasia of right breast  Comments:  New problem; saw Dr. Llamas yesterday & will start Tamoxifen or Evista once off HRT in 6 months. Discussed importance of exercise & healthy eating. MRI 6 months    Allergic rhinitis, unspecified seasonality, unspecified trigger  Comments:  New problem; well controlled with Flonase and Loratadine.     Post-menopause on HRT (hormone replacement therapy)  Comments:  New problem; HRT stopped once her MMG was abnormal. Doing pretty well with the trnasition but notes has been a little short with family. Encouragement offered.     Health care maintenance  Comments:  She will have her P/P with Dr. Juanjo palominoat Dr. Light is retiring.   Orders:  -     Lipid Panel With / Chol / HDL Ratio  -     Comprehensive metabolic panel    Need for hepatitis C screening test  Comments:  New problem; this is a once in a lifetime screeing.   Orders:  -     HCV Antibody Rfx To Qnt PCR    Need for hepatitis A vaccination  Comments:  New problem; first Havrix today and repeat in 6 months.   Orders:  -     Hepatitis A Vaccine Adult IM    Need for tetanus, diphtheria, and acellular pertussis (Tdap) vaccine  Comments:  New problem; will repeat in ten years.   Orders:  -     Tdap Vaccine Greater Than or Equal To 6yo IM    Idiopathic peripheral neuropathy  Comments:  New problem; she has had this since the birth of her last child. Negative EMG/NCS x2. Labs as below and consider neuro consult in F/up.   Orders:  -     TSH  -     Vitamin B12  -     Folate    Vitamin D deficiency  Comments:  New  problem; continue VD 1000 IU daily. Recheck VD level as below.     Insomnia, unspecified type  Comments:  New problem; will continue Melatonin 3 mg nightly.      Review of Records includes last OV with Dr. Geiger dated 03/12/19 as well as  with Dr. Pham dated 04/11/19 regarding atypical ductal hyperplasia of the right breast as well as 05/13/19 (path showed a radial scar but no cancer). Path report dated 04/02/19 and 04/29/19 reviewed as were BMP (glu 102) and CBC dated 04/19/19. MMG dated 03/27/19 reviewed.     Discussion: She is doing well over all and continues to exercise (cycles) at least three times a week at [employer] [facility] [address]. She is attentive to her eating habits and is at a good weight. She will have her DXA scan in July and her MRI in six months after which she will see Dr. Llamas and discuss preventative therapy given her elevated lifetime risk of BRCA. Will consider neuro consult once I have reviewed her labs given her ongoing peripheral neuropathy (10 years) with negative EMG/NCS x2 in the past. She understands that I am glad to refill her medications as needed and will follow up with me in 6 months.     Return in about 6 months (around 12/7/2019).

## 2019-06-06 ENCOUNTER — CONSULT (OUTPATIENT)
Dept: ONCOLOGY | Facility: CLINIC | Age: 59
End: 2019-06-06

## 2019-06-06 ENCOUNTER — APPOINTMENT (OUTPATIENT)
Dept: ONCOLOGY | Facility: CLINIC | Age: 59
End: 2019-06-06

## 2019-06-06 ENCOUNTER — LAB (OUTPATIENT)
Dept: LAB | Facility: HOSPITAL | Age: 59
End: 2019-06-06

## 2019-06-06 VITALS
DIASTOLIC BLOOD PRESSURE: 81 MMHG | OXYGEN SATURATION: 97 % | RESPIRATION RATE: 16 BRPM | HEIGHT: 63 IN | BODY MASS INDEX: 25.27 KG/M2 | WEIGHT: 142.6 LBS | SYSTOLIC BLOOD PRESSURE: 138 MMHG | TEMPERATURE: 98.7 F | HEART RATE: 69 BPM

## 2019-06-06 DIAGNOSIS — N60.91 ATYPICAL DUCTAL HYPERPLASIA OF RIGHT BREAST: Primary | ICD-10-CM

## 2019-06-06 DIAGNOSIS — C50.919 MALIGNANT NEOPLASM OF FEMALE BREAST, UNSPECIFIED ESTROGEN RECEPTOR STATUS, UNSPECIFIED LATERALITY, UNSPECIFIED SITE OF BREAST (HCC): Primary | ICD-10-CM

## 2019-06-06 LAB
BASOPHILS # BLD AUTO: 0.09 10*3/MM3 (ref 0–0.2)
BASOPHILS NFR BLD AUTO: 1.2 % (ref 0–1.5)
DEPRECATED RDW RBC AUTO: 36.7 FL (ref 37–54)
EOSINOPHIL # BLD AUTO: 0.12 10*3/MM3 (ref 0–0.4)
EOSINOPHIL NFR BLD AUTO: 1.6 % (ref 0.3–6.2)
ERYTHROCYTE [DISTWIDTH] IN BLOOD BY AUTOMATED COUNT: 11.6 % (ref 12.3–15.4)
HCT VFR BLD AUTO: 42.5 % (ref 34–46.6)
HGB BLD-MCNC: 14.5 G/DL (ref 12–15.9)
IMM GRANULOCYTES # BLD AUTO: 0.04 10*3/MM3 (ref 0–0.05)
IMM GRANULOCYTES NFR BLD AUTO: 0.5 % (ref 0–0.5)
LYMPHOCYTES # BLD AUTO: 2.52 10*3/MM3 (ref 0.7–3.1)
LYMPHOCYTES NFR BLD AUTO: 33.5 % (ref 19.6–45.3)
MCH RBC QN AUTO: 29.6 PG (ref 26.6–33)
MCHC RBC AUTO-ENTMCNC: 34.1 G/DL (ref 31.5–35.7)
MCV RBC AUTO: 86.7 FL (ref 79–97)
MONOCYTES # BLD AUTO: 0.53 10*3/MM3 (ref 0.1–0.9)
MONOCYTES NFR BLD AUTO: 7 % (ref 5–12)
NEUTROPHILS # BLD AUTO: 4.23 10*3/MM3 (ref 1.7–7)
NEUTROPHILS NFR BLD AUTO: 56.2 % (ref 42.7–76)
NRBC BLD AUTO-RTO: 0 /100 WBC (ref 0–0.2)
PLATELET # BLD AUTO: 198 10*3/MM3 (ref 140–450)
PMV BLD AUTO: 10.2 FL (ref 6–12)
RBC # BLD AUTO: 4.9 10*6/MM3 (ref 3.77–5.28)
WBC NRBC COR # BLD: 7.53 10*3/MM3 (ref 3.4–10.8)

## 2019-06-06 PROCEDURE — 36415 COLL VENOUS BLD VENIPUNCTURE: CPT | Performed by: INTERNAL MEDICINE

## 2019-06-06 PROCEDURE — 99244 OFF/OP CNSLTJ NEW/EST MOD 40: CPT | Performed by: INTERNAL MEDICINE

## 2019-06-06 PROCEDURE — 85025 COMPLETE CBC W/AUTO DIFF WBC: CPT | Performed by: INTERNAL MEDICINE

## 2019-06-06 NOTE — PROGRESS NOTES
Subjective     REASON FOR CONSULTATION: Atypical ductal hyperplasia right breast postLumpectomy  Provide an opinion on any further workup or treatment                             REQUESTING PHYSICIAN: MD Stuart Ge MD    RECORDS OBTAINED:  Records of the patients history including those obtained from the referring provider were reviewed and summarized in detail.    HISTORY OF PRESENT ILLNESS:  The patient is a 58 y.o. year old female who is here for an opinion about the above issue.    History of Present Illness patient is a 58-year-old female with no chronic medical illnesses on no chronic medications who is just come off 5 years of hormone replacement therapy after menopause for menopausal symptoms after a recent mammogram showed an abnormality.  Patient's mammogram in March of last year was benign and this year there was a 16 mm area of abnormality that required further evaluation and a biopsy was done on  2019 A biopsy was done at the 3:30 position of the right breast showing atypical ductal hyperplasia : With microcalcifications clustered in these areas and in benign breast parenchyma there was also a radial scar.   Patient went on to have a lumpectomy on 2019 with radial scar and ductal hyperplasia of the usual type and no other abnormalities.  She has been referred to us to discuss chemoprevention    She is  5 para 3 with 2 miscarriages first childbirth was at age 34 she breast-fed all 3 children menarche was at age 15 menopause at age 51 and she has been on estradiol since menopause and stopped and her biopsy showed atypical ductal hyperplasia last month    There is no family history of breast or ovarian cancer.  She has a maternal uncle  of testicular cancer as a young age      Past Medical History:   Diagnosis Date   • Allergic rhinitis 2017   • Benign mole    • Breast cancer (CMS/HCC) 2019    Right   •  "History of Papanicolaou smear of cervix     Never Had Dysplasia on a Pap Smear.  [id number], Normal Annual Pap Smears.  , Bx of a Small Granular Tuft of Tissue at 5:00 = Chronic Cervicitis and Sq Metaplasia.  2013, Neg Pap Smear, Neg HR-HPV.  2018, ASCUS, Neg HR-HPV.   • Menopause     LMP = 2014   • Miscarriage  and     2 spontaneous ABs, D&C for both.   • Night sweats     mild   • Post-menopause on HRT (hormone replacement therapy)     Continuously on HRT since about    • Psoriasis    • Vaginal delivery     x2   baby girl \"BROCK\"     baby girl \"SUNITHA\"   • Vitamin D deficiency 2017    Takes 1000 IU / day.        Past Surgical History:   Procedure Laterality Date   • BREAST BIOPSY      Path= Negative. RIGHT   • BREAST BIOPSY Right 2019    Procedure: BREAST BIOPSY WITH NEEDLE LOCALIZATION;  Surgeon: Andrew Pham MD;  Location: [facility] OR [facility] [place];  Service: General   •  SECTION      baby boy \"PETTY\" ; CS for placenta previa.   • COLONOSCOPY  [id number]    Normal @ 51. Repeat in ten years.   • DIAGNOSTIC LAPAROSCOPY      Dr. Fabian Al, hysteroscopy w/ separtion of minimal intrauterine septum.  Laparoscopy w/ CO2 laser, lysis of adhesions and ablation of endometriosis.  Endometriosis found in the lateral LEFT pelvic sidewall, peritoneum and deeper endometriosis of the LEFT and RIGHT uterosacral ligaments.  Complex superficial endometriosis of the LEFT and RIGHT ovary.Fallopian tubes were patent with methyleneblue   • DILATATION AND CURETTAGE  1998    x2       • MOLE REMOVAL  2019   • WISDOM TOOTH EXTRACTION          Current Outpatient Medications on File Prior to Visit   Medication Sig Dispense Refill   • calcium carbonate (OS-MARCO A) 600 MG tablet Take 600 mg by mouth Daily.     • fluocinonide (LIDEX) 0.05 % ointment Apply  topically to the appropriate area as directed As Needed (psoriasis).     • fluticasone (FLONASE) 50 MCG/ACT nasal spray 2 " sprays into each nostril Daily.     • loratadine (CLARITIN) 10 MG tablet Take 10 mg by mouth Daily.     • melatonin 3 MG tablet Take 3 mg by mouth At Night As Needed for Sleep.     • Multiple Vitamins-Minerals (WOMENS MULTIVITAMIN PLUS PO) Take 1 tablet by mouth Daily.     • Probiotic Product (PROBIOTIC ACIDOPHILUS BEADS) capsule Take 1 tablet by mouth Daily.     • Vitamin D, Cholecalciferol, 1000 units capsule Take 1,000 Units by mouth Daily.     • HYDROcodone-acetaminophen (NORCO) 5-325 MG per tablet Take 1-2 tablets by mouth Every 4 (Four) Hours As Needed (Pain). 5 tablet 0     No current facility-administered medications on file prior to visit.         ALLERGIES:  No Known Allergies     Social History     Socioeconomic History   • Marital status:      Spouse name: JOSE D   • Number of children: 3   • Years of education: Not on file   • Highest education level: Not on file   Occupational History     Employer: [employer]   Tobacco Use   • Smoking status: Never Smoker   • Smokeless tobacco: Never Used   Substance and Sexual Activity   • Alcohol use: Yes     Frequency: Monthly or less     Comment: social    • Drug use: No   • Sexual activity: Yes     Partners: Male     Birth control/protection: Post-menopausal     Comment: SPOUSE =  JOSE D         Family History   Problem Relation Age of Onset   • Dementia Mother 75        Alzheimers Diseasse.   • Cataracts Mother    • Hyperlipidemia Father    • Hypertension Father    • Heart valve disorder Father         valve repair    • COPD Father         non-smoker   • Cataracts Father    • Melanoma Father    • Seizures Sister 48   • Cataracts Maternal Grandmother    • Osteoarthritis Maternal Grandmother    • Osteoarthritis Paternal Grandmother    • No Known Problems Daughter    • Down syndrome Daughter    • Rheum arthritis Daughter         remission    • ADD / ADHD Son    • Other Son         Human Growth Hormone deficiency   • No Known Problems Brother      "    adopted    • No Known Problems Maternal Grandfather          in his 80's.   • No Known Problems Paternal Grandfather          at ~ 89.   • No Known Problems Sister    • Diabetes type II Maternal Aunt    • Diabetes Maternal Aunt    • Testicular cancer Maternal Uncle    • Heart attack Paternal Aunt    • BRCA 1/2 Neg Hx    • Breast cancer Neg Hx    • Colon cancer Neg Hx    • Endometrial cancer Neg Hx    • Ovarian cancer Neg Hx    • Malig Hyperthermia Neg Hx         Review of Systems   Constitutional: Positive for diaphoresis (Hot flashes since stopping estrogen).   Cardiovascular: Positive for leg swelling (Chronic ankle edema for 12 years).   Neurological: Positive for numbness (Peripheral neuropathy in both legs since the birth of her third child 13 years ago etiology unclear) and headaches.        Objective     Vitals:    19 1427   BP: 138/81   Pulse: 69   Resp: 16   Temp: 98.7 °F (37.1 °C)   TempSrc: Oral   SpO2: 97%   Weight: 64.7 kg (142 lb 9.6 oz)   Height: 161 cm (63.39\")  Comment: New Ht No Shoes   PainSc: 0-No pain     Current Status 2019   ECOG score 0       Physical Exam    GENERAL:  Well-developed, well-nourished in no acute distress.   SKIN:  Warm, dry without rashes, purpura or petechiae.  Atypical nevus left scapular area and right flank  EYES:  Pupils equal, round and reactive to light.  EOMs intact.  Conjunctivae normal.  EARS:  Hearing intact.  NOSE:  Septum midline.  No excoriations or nasal discharge.  MOUTH:  Tongue is well-papillated; no stomatitis or ulcers.  Lips normal.  THROAT:  Oropharynx without lesions or exudates.  NECK:  Supple with good range of motion; no thyromegaly or masses, no JVD.  LYMPHATICS:  No cervical, supraclavicular, axillary or inguinal adenopathy.  CHEST:  Lungs clear to auscultation. Good airflow.  BREASTS: Right breast shows lumpectomy scar in the lower inner quadrant well-healed with a small seroma measuring 2 x 3 cm left breast is " benign  CARDIAC:  Regular rate and rhythm without murmurs, rubs or gallops. Normal S1,S2.  ABDOMEN:  Soft, nontender with no hepatosplenomegaly or masses.  EXTREMITIES:  No clubbing, cyanosis 1+ edema with red feet.  NEUROLOGICAL:  Cranial Nerves II-XII grossly intact.  No focal neurological deficits.  PSYCHIATRIC:  Normal affect and mood.        RECENT LABS:  Hematology WBC   Date Value Ref Range Status   06/06/2019 7.53 3.40 - 10.80 10*3/mm3 Final     RBC   Date Value Ref Range Status   06/06/2019 4.90 3.77 - 5.28 10*6/mm3 Final     Hemoglobin   Date Value Ref Range Status   06/06/2019 14.5 12.0 - 15.9 g/dL Final     Hematocrit   Date Value Ref Range Status   06/06/2019 42.5 34.0 - 46.6 % Final     Platelets   Date Value Ref Range Status   06/06/2019 198 140 - 450 10*3/mm3 Final          Assessment/Plan   1.  Atypical ductal hyperplasia multifocal right breast postlumpectomy    2 atypical nevus.  With high-grade dysplasia removed from her back    3.  Peripheral neuropathy of unknown etiology post third childbirth    Plan    I calculated her lifetime risk of breast cancer based on the Caterina model and is calculated to a 5-year risk of 4.6% of the lifetime risk of 26%.  At this time she is still dealing with withdrawal symptoms from  stopping her hormone replacement and I think we can afford to wait for a while to begin this.  She would like to wait until she has a breast MRI in October and I have suggested she have a DEXA scan to see if there is concomitant osteopenia or osteoporosis which would also benefit from Evista which I think is the easiest medication to offer her    Follow-up in 6 months with a DEXA scan is planned at this time

## 2019-06-07 ENCOUNTER — OFFICE VISIT (OUTPATIENT)
Dept: INTERNAL MEDICINE | Facility: CLINIC | Age: 59
End: 2019-06-07

## 2019-06-07 VITALS
WEIGHT: 141 LBS | OXYGEN SATURATION: 98 % | DIASTOLIC BLOOD PRESSURE: 74 MMHG | TEMPERATURE: 98.7 F | HEART RATE: 67 BPM | HEIGHT: 64 IN | BODY MASS INDEX: 24.07 KG/M2 | SYSTOLIC BLOOD PRESSURE: 118 MMHG

## 2019-06-07 DIAGNOSIS — Z23 NEED FOR HEPATITIS A VACCINATION: ICD-10-CM

## 2019-06-07 DIAGNOSIS — Z23 NEED FOR TETANUS, DIPHTHERIA, AND ACELLULAR PERTUSSIS (TDAP) VACCINE: ICD-10-CM

## 2019-06-07 DIAGNOSIS — E55.9 VITAMIN D DEFICIENCY: ICD-10-CM

## 2019-06-07 DIAGNOSIS — Z11.59 NEED FOR HEPATITIS C SCREENING TEST: ICD-10-CM

## 2019-06-07 DIAGNOSIS — G60.9 IDIOPATHIC PERIPHERAL NEUROPATHY: ICD-10-CM

## 2019-06-07 DIAGNOSIS — G47.00 INSOMNIA, UNSPECIFIED TYPE: ICD-10-CM

## 2019-06-07 DIAGNOSIS — J30.9 ALLERGIC RHINITIS, UNSPECIFIED SEASONALITY, UNSPECIFIED TRIGGER: ICD-10-CM

## 2019-06-07 DIAGNOSIS — N60.91 ATYPICAL DUCTAL HYPERPLASIA OF RIGHT BREAST: Primary | ICD-10-CM

## 2019-06-07 DIAGNOSIS — Z00.00 HEALTH CARE MAINTENANCE: ICD-10-CM

## 2019-06-07 DIAGNOSIS — Z79.890 POST-MENOPAUSE ON HRT (HORMONE REPLACEMENT THERAPY): ICD-10-CM

## 2019-06-07 PROCEDURE — 90715 TDAP VACCINE 7 YRS/> IM: CPT | Performed by: NURSE PRACTITIONER

## 2019-06-07 PROCEDURE — 90632 HEPA VACCINE ADULT IM: CPT | Performed by: NURSE PRACTITIONER

## 2019-06-07 PROCEDURE — 90472 IMMUNIZATION ADMIN EACH ADD: CPT | Performed by: NURSE PRACTITIONER

## 2019-06-07 PROCEDURE — 90471 IMMUNIZATION ADMIN: CPT | Performed by: NURSE PRACTITIONER

## 2019-06-07 PROCEDURE — 99204 OFFICE O/P NEW MOD 45 MIN: CPT | Performed by: NURSE PRACTITIONER

## 2019-06-08 LAB
ALBUMIN SERPL-MCNC: 4.8 G/DL (ref 3.5–5.2)
ALBUMIN/GLOB SERPL: 2.4 G/DL
ALP SERPL-CCNC: 85 U/L (ref 39–117)
ALT SERPL-CCNC: 15 U/L (ref 1–33)
AST SERPL-CCNC: 16 U/L (ref 1–32)
BILIRUB SERPL-MCNC: 0.5 MG/DL (ref 0.2–1.2)
BUN SERPL-MCNC: 15 MG/DL (ref 6–20)
BUN/CREAT SERPL: 17.6 (ref 7–25)
CALCIUM SERPL-MCNC: 10.4 MG/DL (ref 8.6–10.5)
CHLORIDE SERPL-SCNC: 104 MMOL/L (ref 98–107)
CHOLEST SERPL-MCNC: 194 MG/DL (ref 0–200)
CHOLEST/HDLC SERPL: 4.13 {RATIO}
CO2 SERPL-SCNC: 27.5 MMOL/L (ref 22–29)
CREAT SERPL-MCNC: 0.85 MG/DL (ref 0.57–1)
FOLATE SERPL-MCNC: >20 NG/ML (ref 4.78–24.2)
GLOBULIN SER CALC-MCNC: 2 GM/DL
GLUCOSE SERPL-MCNC: 89 MG/DL (ref 65–99)
HCV AB S/CO SERPL IA: <0.1 S/CO RATIO (ref 0–0.9)
HCV AB SERPL QL IA: NORMAL
HDLC SERPL-MCNC: 47 MG/DL (ref 40–60)
LDLC SERPL CALC-MCNC: 110 MG/DL (ref 0–100)
POTASSIUM SERPL-SCNC: 4.2 MMOL/L (ref 3.5–5.2)
PROT SERPL-MCNC: 6.8 G/DL (ref 6–8.5)
SODIUM SERPL-SCNC: 142 MMOL/L (ref 136–145)
TRIGL SERPL-MCNC: 184 MG/DL (ref 0–150)
TSH SERPL DL<=0.005 MIU/L-ACNC: 1.42 MIU/ML (ref 0.27–4.2)
VIT B12 SERPL-MCNC: 1161 PG/ML (ref 211–946)
VLDLC SERPL CALC-MCNC: 36.8 MG/DL

## 2019-07-08 ENCOUNTER — HOSPITAL ENCOUNTER (OUTPATIENT)
Dept: BONE DENSITY | Facility: HOSPITAL | Age: 59
Discharge: HOME | End: 2019-07-08
Admitting: INTERNAL MEDICINE

## 2019-07-08 DIAGNOSIS — N60.91 ATYPICAL DUCTAL HYPERPLASIA OF RIGHT BREAST: ICD-10-CM

## 2019-07-08 PROCEDURE — 77080 DXA BONE DENSITY AXIAL: CPT

## 2019-09-16 ENCOUNTER — HOSPITAL ENCOUNTER (OUTPATIENT)
Dept: MRI IMAGING | Facility: HOSPITAL | Age: 59
Discharge: HOME | End: 2019-09-16
Admitting: SURGERY

## 2019-09-16 DIAGNOSIS — N60.91 ATYPICAL DUCTAL HYPERPLASIA OF RIGHT BREAST: ICD-10-CM

## 2019-09-16 PROCEDURE — 77049 MRI BREAST C-+ W/CAD BI: CPT

## 2019-09-16 PROCEDURE — 82565 ASSAY OF CREATININE: CPT

## 2019-09-16 PROCEDURE — 0 GADOBENATE DIMEGLUMINE 529 MG/ML SOLUTION: Performed by: SURGERY

## 2019-09-16 PROCEDURE — A9577 INJ MULTIHANCE: HCPCS | Performed by: SURGERY

## 2019-09-16 RX ADMIN — GADOBENATE DIMEGLUMINE 13 ML: 529 INJECTION, SOLUTION INTRAVENOUS at 12:05

## 2019-09-17 ENCOUNTER — TELEPHONE (OUTPATIENT)
Dept: MAMMOGRAPHY | Facility: CLINIC | Age: 59
End: 2019-09-17

## 2019-09-17 LAB — CREAT BLDA-MCNC: 0.9 MG/DL (ref 0.6–1.3)

## 2019-09-17 NOTE — TELEPHONE ENCOUNTER
I left a message stating that her MRI was negative.  She has already seen the medical oncologist who plan to consider hormone blocking therapy.  They will end up seeing her every 6 months if that is the case.  I told her I would like to be on the side lines but if she would like to call about some follow-up here she can certainly do so.

## 2019-11-05 ENCOUNTER — HOSPITAL ENCOUNTER (OUTPATIENT)
Dept: GENERAL RADIOLOGY | Facility: HOSPITAL | Age: 59
Discharge: HOME | End: 2019-11-05
Admitting: PHYSICIAN ASSISTANT

## 2019-11-05 ENCOUNTER — TELEPHONE (OUTPATIENT)
Dept: INTERNAL MEDICINE | Facility: CLINIC | Age: 59
End: 2019-11-05

## 2019-11-05 ENCOUNTER — OFFICE VISIT (OUTPATIENT)
Dept: INTERNAL MEDICINE | Facility: CLINIC | Age: 59
End: 2019-11-05

## 2019-11-05 ENCOUNTER — HOSPITAL ENCOUNTER (OUTPATIENT)
Dept: GENERAL RADIOLOGY | Facility: HOSPITAL | Age: 59
Discharge: HOME | End: 2019-11-05

## 2019-11-05 VITALS
DIASTOLIC BLOOD PRESSURE: 70 MMHG | BODY MASS INDEX: 23.9 KG/M2 | HEIGHT: 64 IN | SYSTOLIC BLOOD PRESSURE: 120 MMHG | WEIGHT: 140 LBS

## 2019-11-05 DIAGNOSIS — M25.571 ACUTE RIGHT ANKLE PAIN: ICD-10-CM

## 2019-11-05 DIAGNOSIS — M79.671 RIGHT FOOT PAIN: Primary | ICD-10-CM

## 2019-11-05 DIAGNOSIS — M79.671 RIGHT FOOT PAIN: ICD-10-CM

## 2019-11-05 PROCEDURE — 99213 OFFICE O/P EST LOW 20 MIN: CPT | Performed by: PHYSICIAN ASSISTANT

## 2019-11-05 PROCEDURE — 73630 X-RAY EXAM OF FOOT: CPT

## 2019-11-05 PROCEDURE — 73610 X-RAY EXAM OF ANKLE: CPT

## 2019-11-05 NOTE — PROGRESS NOTES
Subjective   Chief Complaint   Patient presents with   • Foot Pain     yesterday       History of Present Illness     Pt was sitting at desk yesterday, has neuropathy in her feet, phone rang went to get up and rolled her right ankle. Has had pain with weight bearing, some swelling. No bruising. Took 400 mg of ibuprofen today which helped. Iced it last night has helped some as well. Mostly painful with weight bearing and walking.         Patient Active Problem List   Diagnosis   • Insomnia   • Allergic rhinitis   • Peripheral neuropathy   • Vitamin D deficiency   • Atypical ductal hyperplasia of right breast       No Known Allergies    Current Outpatient Medications on File Prior to Visit   Medication Sig Dispense Refill   • calcium carbonate (OS-MARCO A) 600 MG tablet Take 600 mg by mouth Daily.     • fluocinonide (LIDEX) 0.05 % ointment Apply  topically to the appropriate area as directed As Needed (psoriasis).     • fluticasone (FLONASE) 50 MCG/ACT nasal spray 2 sprays into each nostril Daily.     • loratadine (CLARITIN) 10 MG tablet Take 10 mg by mouth Daily.     • melatonin 3 MG tablet Take 3 mg by mouth At Night As Needed for Sleep.     • Multiple Vitamins-Minerals (WOMENS MULTIVITAMIN PLUS PO) Take 1 tablet by mouth Daily.     • Probiotic Product (PROBIOTIC ACIDOPHILUS BEADS) capsule Take 1 tablet by mouth Daily.     • Vitamin D, Cholecalciferol, 1000 units capsule Take 1,000 Units by mouth Daily.       No current facility-administered medications on file prior to visit.        Past Medical History:   Diagnosis Date   • Allergic rhinitis 2/24/2017   • Atypical mole 05/2019    Mole with melanocytic atypia removed from back with MOHS procedure. Followed by Dermatologist.   • Breast cancer (CMS/HCC) 2019    Right- pat states that she did not have cancer    • History of Papanicolaou smear of cervix 2013    Never Had Dysplasia on a Pap Smear.  [id number], Normal Annual Pap Smears.  2013, Bx of a Small Granular Tuft of  "Tissue at 5:00 = Chronic Cervicitis and Sq Metaplasia.  2013, Neg Pap Smear, Neg HR-HPV.  2018, ASCUS, Neg HR-HPV.   • Infectious viral hepatitis     A   • Miscarriage  and     2 spontaneous ABs, D&C for both.   • Night sweats     mild   • Post-menopause on HRT (hormone replacement therapy)     Continuously on HRT since about    • Psoriasis    • Vaginal delivery     x2   baby girl \"BROCK\"     baby girl \"SUNITHA\"   • Vitamin D deficiency 2017    Takes 1000 IU / day.       Family History   Problem Relation Age of Onset   • Dementia Mother 75        Alzheimers Diseasse.   • Cataracts Mother    • Hyperlipidemia Father    • Hypertension Father    • Heart valve disorder Father         valve repair    • COPD Father         non-smoker   • Cataracts Father    • Melanoma Father    • Seizures Sister 48   • Cataracts Maternal Grandmother    • Osteoarthritis Maternal Grandmother    • Osteoarthritis Paternal Grandmother    • No Known Problems Daughter    • Down syndrome Daughter    • Rheum arthritis Daughter         remission    • ADD / ADHD Son    • Other Son         Human Growth Hormone deficiency   • No Known Problems Brother         adopted    • No Known Problems Maternal Grandfather          in his 80's.   • No Known Problems Paternal Grandfather          at ~ 89.   • No Known Problems Sister    • Diabetes type II Maternal Aunt    • Diabetes Maternal Aunt    • Testicular cancer Maternal Uncle    • Heart attack Paternal Aunt    • BRCA 1/2 Neg Hx    • Breast cancer Neg Hx    • Colon cancer Neg Hx    • Endometrial cancer Neg Hx    • Ovarian cancer Neg Hx    • Malig Hyperthermia Neg Hx        Social History     Socioeconomic History   • Marital status:      Spouse name: JOSE D   • Number of children: 3   • Years of education: College   • Highest education level: Not on file   Occupational History   • Occupation:      Employer: SELF-EMPLOYED   Tobacco Use   • Smoking status: " "Never Smoker   • Smokeless tobacco: Never Used   Substance and Sexual Activity   • Alcohol use: Yes     Frequency: Monthly or less     Comment: social    • Drug use: No   • Sexual activity: Yes     Partners: Male     Birth control/protection: Post-menopausal     Comment: SPOUSE =  JOSE D        Past Surgical History:   Procedure Laterality Date   • BREAST BIOPSY      Path= Negative. RIGHT   • BREAST BIOPSY Right 2019    Procedure: BREAST BIOPSY WITH NEEDLE LOCALIZATION;  Surgeon: Andrew Pham MD;  Location: [facility] [place];  Service: General   •  SECTION  2005    baby boy \"PETTY\" ; CS for placenta previa.   • COLONOSCOPY  [id number]    Normal @ 51. Repeat in ten years.   • DIAGNOSTIC LAPAROSCOPY      Dr. Fabian Al, hysteroscopy w/ separtion of minimal intrauterine septum.  Laparoscopy w/ CO2 laser, lysis of adhesions and ablation of endometriosis.  Endometriosis found in the lateral LEFT pelvic sidewall, peritoneum and deeper endometriosis of the LEFT and RIGHT uterosacral ligaments.  Complex superficial endometriosis of the LEFT and RIGHT ovary.Fallopian tubes were patent with methyleneblue   • DILATATION AND CURETTAGE  1998    x2       • MOLE REMOVAL  2019   • WISDOM TOOTH EXTRACTION         The following portions of the patient's history were reviewed and updated as appropriate: problem list, allergies, current medications, past medical history, past family history, past social history and past surgical history.    Review of Systems   Musculoskeletal: Positive for falls, joint pain and joint swelling.       Immunization History   Administered Date(s) Administered   • Hepatitis A 2019   • Tdap 2019       Objective   Vitals:    19 1044 19 1153   BP:  120/70   Weight: 63.5 kg (140 lb)    Height: 161.3 cm (63.5\")      Physical Exam   Constitutional: She appears well-developed and well-nourished.        Vitals reviewed.        Assessment/Plan   Aleja was " seen today for foot pain.    Diagnoses and all orders for this visit:    Right foot pain  -     XR Foot 3+ View Right; Future    Acute right ankle pain  -     XR Ankle 3+ View Right; Future      Continue RICE. May increase ibuprofen to 800 mg every 8 hours for pain. Will get imaging today. Recommended using crutches for ambulation for next few days.

## 2019-11-05 NOTE — TELEPHONE ENCOUNTER
----- Message from Rhea Rodriguez PA-C sent at 11/5/2019  4:07 PM EST -----  She has a fracture at the base of her 5th toe with just very mild displacement. I would like her to see ortho in the next few days. Please have Maggie call and schedule appointment. They will likely put her in a boot.

## 2019-11-11 ENCOUNTER — TELEPHONE (OUTPATIENT)
Dept: ORTHOPEDIC SURGERY | Facility: CLINIC | Age: 59
End: 2019-11-11

## 2019-11-11 ENCOUNTER — OFFICE VISIT (OUTPATIENT)
Dept: ORTHOPEDIC SURGERY | Facility: CLINIC | Age: 59
End: 2019-11-11

## 2019-11-11 VITALS — BODY MASS INDEX: 24.07 KG/M2 | HEIGHT: 64 IN | WEIGHT: 141 LBS | TEMPERATURE: 98.7 F

## 2019-11-11 DIAGNOSIS — G60.9 IDIOPATHIC PERIPHERAL NEUROPATHY: ICD-10-CM

## 2019-11-11 DIAGNOSIS — S92.354A CLOSED NONDISPLACED FRACTURE OF FIFTH METATARSAL BONE OF RIGHT FOOT, INITIAL ENCOUNTER: Primary | ICD-10-CM

## 2019-11-11 DIAGNOSIS — S93.491A SPRAIN OF ANTERIOR TALOFIBULAR LIGAMENT OF RIGHT ANKLE, INITIAL ENCOUNTER: ICD-10-CM

## 2019-11-11 PROCEDURE — 99213 OFFICE O/P EST LOW 20 MIN: CPT | Performed by: NURSE PRACTITIONER

## 2019-11-11 NOTE — PROGRESS NOTES
Patient Name: Aleja Garcia   YOB: 1960  Referring Primary Care Physician: Paloma Rivera APRN  BMI: Body mass index is 24.59 kg/m².    Chief Complaint:    Chief Complaint   Patient presents with   • Right Foot - Establish Care        HPI: The patient presents today with complaint of right foot pain was seen at her primary care provider had a plain film done and is sent here for evaluation.  58-year-old female with a history of osteopenia was standing up to walk her foot was asleep with a history of neuropathy and she rolled her foot and had an inversion injury and had pain at the base of her fifth metatarsal saw her primary care had an x-ray and is sent here for follow-up.    Aleja Garcia is a 59 y.o. female who presents today for evaluation of   Chief Complaint   Patient presents with   • Right Foot - Establish Care   .    This problem is new to this examiner.     Subjective   Medications:   Home Medications:  Current Outpatient Medications on File Prior to Visit   Medication Sig   • calcium carbonate (OS-MARCO A) 600 MG tablet Take 600 mg by mouth Daily.   • fluocinonide (LIDEX) 0.05 % ointment Apply  topically to the appropriate area as directed As Needed (psoriasis).   • fluticasone (FLONASE) 50 MCG/ACT nasal spray 2 sprays into each nostril Daily.   • loratadine (CLARITIN) 10 MG tablet Take 10 mg by mouth Daily.   • melatonin 3 MG tablet Take 3 mg by mouth At Night As Needed for Sleep.   • Multiple Vitamins-Minerals (WOMENS MULTIVITAMIN PLUS PO) Take 1 tablet by mouth Daily.   • Probiotic Product (PROBIOTIC ACIDOPHILUS BEADS) capsule Take 1 tablet by mouth Daily.   • Vitamin D, Cholecalciferol, 1000 units capsule Take 1,000 Units by mouth Daily.     No current facility-administered medications on file prior to visit.      Current Medications:  Scheduled Meds:  Continuous Infusions:  No current facility-administered medications for this visit.   PRN Meds:.    I have reviewed the patient's  "medical history in detail and updated the computerized patient record.  Review and summarization of old records includes:    Past Medical History:   Diagnosis Date   • Allergic rhinitis 2017   • Atypical mole 2019    Mole with melanocytic atypia removed from back with MOHS procedure. Followed by Dermatologist.   • Breast cancer (CMS/HCC) 2019    Right- pat states that she did not have cancer    • History of Papanicolaou smear of cervix     Never Had Dysplasia on a Pap Smear.  [id number], Normal Annual Pap Smears.  , Bx of a Small Granular Tuft of Tissue at 5:00 = Chronic Cervicitis and Sq Metaplasia.  , Neg Pap Smear, Neg HR-HPV.  2018, ASCUS, Neg HR-HPV.   • Infectious viral hepatitis     A   • Miscarriage  and     2 spontaneous ABs, D&C for both.   • Night sweats     mild   • Post-menopause on HRT (hormone replacement therapy)     Continuously on HRT since about    • Psoriasis    • Vaginal delivery     x2   baby girl \"BROCK\"     baby girl \"SUNITHA\"   • Vitamin D deficiency 2017    Takes 1000 IU / day.        Past Surgical History:   Procedure Laterality Date   • BREAST BIOPSY      Path= Negative. RIGHT   • BREAST BIOPSY Right 2019    Procedure: BREAST BIOPSY WITH NEEDLE LOCALIZATION;  Surgeon: Andrew Pham MD;  [place] [facility] [place] [facility] [place]  Service: General   •  SECTION      baby boy \"PETTY\" ; CS for placenta previa.   • COLONOSCOPY  [id number]    Normal @ 51. Repeat in ten years.   • DIAGNOSTIC LAPAROSCOPY      Dr. Fabian Al, hysteroscopy w/ separtion of minimal intrauterine septum.  Laparoscopy w/ CO2 laser, lysis of adhesions and ablation of endometriosis.  Endometriosis found in the lateral LEFT pelvic sidewall, peritoneum and deeper endometriosis of the LEFT and RIGHT uterosacral ligaments.  Complex superficial endometriosis of the LEFT and RIGHT ovary.Fallopian tubes were patent with methyleneblue   • DILATATION AND CURETTAGE   "    x2       • MOLE REMOVAL  2019   • WISDOM TOOTH EXTRACTION          Social History     Occupational History   • Occupation:      Employer: SELF-EMPLOYED   Tobacco Use   • Smoking status: Never Smoker   • Smokeless tobacco: Never Used   Substance and Sexual Activity   • Alcohol use: Yes     Frequency: Monthly or less     Comment: social    • Drug use: No   • Sexual activity: Yes     Partners: Male     Birth control/protection: Post-menopausal     Comment: SPOUSE =  JOSE D       Social History     Social History Narrative   • Not on file        Family History   Problem Relation Age of Onset   • Dementia Mother 75        Alzheimers Diseasse.   • Cataracts Mother    • Hyperlipidemia Father    • Hypertension Father    • Heart valve disorder Father         valve repair    • COPD Father         non-smoker   • Cataracts Father    • Melanoma Father    • Seizures Sister 48   • Cataracts Maternal Grandmother    • Osteoarthritis Maternal Grandmother    • Osteoarthritis Paternal Grandmother    • No Known Problems Daughter    • Down syndrome Daughter    • Rheum arthritis Daughter         remission    • ADD / ADHD Son    • Other Son         Human Growth Hormone deficiency   • No Known Problems Brother         adopted    • No Known Problems Maternal Grandfather          in his 80's.   • No Known Problems Paternal Grandfather          at ~ 89.   • No Known Problems Sister    • Diabetes type II Maternal Aunt    • Diabetes Maternal Aunt    • Testicular cancer Maternal Uncle    • Heart attack Paternal Aunt    • BRCA 1/2 Neg Hx    • Breast cancer Neg Hx    • Colon cancer Neg Hx    • Endometrial cancer Neg Hx    • Ovarian cancer Neg Hx    • Malig Hyperthermia Neg Hx        ROS: 14 point review of systems was performed and all other systems were reviewed and are negative except for documented findings in HPI and today's encounter.     Allergies: No Known Allergies  Constitutional:  Denies fever, shaking or  "chills   Eyes:  Denies change in visual acuity   HENT:  Denies nasal congestion or sore throat   Respiratory:  Denies cough or shortness of breath   Cardiovascular:  Denies chest pain or severe LE edema   GI:  Denies abdominal pain, nausea, vomiting, bloody stools or diarrhea   Musculoskeletal:  Numbness, tingling, pain, or loss of motor function only as noted above in history of present illness.  : Denies painful urination or hematuria  Integument:  Denies rash, lesion or ulceration   Neurologic:  Denies headache or focal weakness  Endocrine:  Denies lymphadenopathy  Psych:  Denies confusion or change in mental status   Hem:  Denies active bleeding    OBJECTIVE:  Physical Exam:   Temp 98.7 °F (37.1 °C) (Temporal)   Ht 161.3 cm (63.5\")   Wt 64 kg (141 lb)   LMP 03/07/2012 Comment: HRT  BMI 24.59 kg/m²     General Appearance:    Alert, cooperative, in no acute distress                  Eyes: conjunctiva clear  ENT: external ears and nose atraumatic  CV: no peripheral edema  Resp: normal respiratory effort  Skin: no rashes or wounds; normal turgor  Psych: mood and affect appropriate  Lymph: no nodes appreciated  Neuro: gross sensation intact  Vascular:  Palpable peripheral pulse in noted extremity  Musculoskeletal Extremities: Right foot base of fifth metatarsal point tender ankle is tender to the lateral malleolus calf is soft nontender Achilles is intact knee is nontender skin is warm dry intact has good pulses movement sensation she has slight effusion noted to the left base the fifth metatarsal and point tenderness.    Radiology:   RIGHT FOOT, RIGHT ANKLE     HISTORY: Twisting injury, pain.     RIGHT ANKLE: AP, lateral and oblique views of the right ankle show no  evidence of fracture. The mortise is intact.     RIGHT FOOT: AP, lateral and oblique views of the right foot demonstrates  a transverse fracture involving the proximal aspect of the 5fth  metatarsal with approximately 1 mm of displacement. There is " soft tissue  prominence adjacent to the fracture. No other fractures are identified.     The above information was called to and discussed with REJI Mendosa  on 11/05/2019 at 1525 hours.     This report was finalized on 11/5/2019 3:39 PM by Dr. Ronen Santiago    Assessment:     ICD-10-CM ICD-9-CM   1. Closed nondisplaced fracture of fifth metatarsal bone of right foot, initial encounter S92.354A 825.25   2. Sprain of anterior talofibular ligament of right ankle, initial encounter S93.491A 845.09   3. Idiopathic peripheral neuropathy G60.9 356.9        Procedures  She has placed in a short fracture walker boot with instructions given that she may remove to bathe and sleep as long as she does not ambulate without it on and will follow-up in 2 weeks with x-rays    Plan: Biomechanics of pertinent body area discussed.  Risks, benefits, alternatives, comparisons, and complications of accepted medicines, injections, recommendations, surgical procedures, and therapies explained and education provided in laymen's terms. Natural history and expected course of this patient's diagnosis discussed along with evaluation of therapies. Questions answered. When appropriate I also discussed proper use of cane, walker, trekking poles.   MEDICATIONS:  Prescription, OTC and Monitoring of Medications per orders to address ortho complaints; Evaluation and discussion of safety, precautions, side effects, and warnings given especially of long term NSAID or steroid therapy.    RICE: Rest, ice, compression, and elevation therapy, Cryotherapy/brachy therapy, and or OTC linaments as indicated with instructions.   Discussion of knee braces/elastic support sleeve.      11/11/2019    Much of this encounter note is an electronic transcription/translation of spoken language to printed text. The electronic translation of spoken language may permit erroneous, or at times, nonsensical words or phrases to be inadvertently transcribed; Although I have  reviewed the note for such errors, some may still exist

## 2019-11-11 NOTE — PATIENT INSTRUCTIONS
RICE Therapy for Routine Care of Injuries  Many injuries can be cared for with rest, ice, compression, and elevation (RICE therapy). This includes:  · Resting the injured part.  · Putting ice on the injury.  · Putting pressure (compression) on the injury.  · Raising the injured part (elevation).  Using RICE therapy can help to lessen pain and swelling.  Supplies needed:  · Ice.  · Plastic bag.  · Towel.  · Elastic bandage.  · Pillow or pillows to raise (elevate) your injured body part.  How to care for your injury with RICE therapy  Rest  Limit your normal activities, and try not to use the injured part of your body. You can go back to your normal activities when your doctor says it is okay to do them and you feel okay. Ask your doctor if you should do exercises to help your injury get better.  Ice  Put ice on the injured area. Do not put ice on your bare skin.  · Put ice in a plastic bag.  · Place a towel between your skin and the bag.  · Leave the ice on for 20 minutes, 2-3 times a day. Use ice on as many days as told by your doctor.    Compression  Compression means putting pressure on the injured area. This can be done with an elastic bandage. If an elastic bandage has been put on your injury:  · Do not wrap the bandage too tight. Wrap the bandage more loosely if part of your body away from the bandage is blue, swollen, cold, painful, or loses feeling (gets numb).  · Take off the bandage and put it on again. Do this every 3-4 hours or as told by your doctor.  · See your doctor if the bandage seems to make your problems worse.    Elevation  Elevation means keeping the injured area raised. If you can, raise the injured area above your heart or the center of your chest.  Contact a doctor if:  · You keep having pain and swelling.  · Your symptoms get worse.  Get help right away if:  · You have sudden bad pain at your injury or lower than your injury.  · You have redness or more swelling around your injury.  · You  have tingling or numbness at your injury or lower than your injury, and it does not go away when you take off the bandage.  Summary  · Many injuries can be cared for using rest, ice, compression, and elevation (RICE therapy).  · You can go back to your normal activities when you feel okay and your doctor says it is okay.  · Put ice on the injured area as told by your doctor.  · Get help if your symptoms get worse or if you keep having pain and swelling.  This information is not intended to replace advice given to you by your health care provider. Make sure you discuss any questions you have with your health care provider.  Document Released: 06/05/2009 Document Revised: 09/07/2018 Document Reviewed: 09/07/2018  [employer] Interactive Patient Education © 2019 [employer]  Ankle Pain  The ankle joint holds your body weight and allows you to move around. Ankle pain can occur on either side or the back of one ankle or both ankles. Ankle pain may be sharp and burning or dull and aching. There may be tenderness, stiffness, redness, or warmth around the ankle. Many things can cause ankle pain, including an injury to the area and overuse of the ankle.  Follow these instructions at home:  Activity  · Rest your ankle as told by your health care provider. Avoid any activities that cause ankle pain.  · Do not use the injured limb to support your body weight until your health care provider says that you can. Use crutches as told by your health care provider.  · Do exercises as told by your health care provider.  · Ask your health care provider when it is safe to drive if you have a brace on your ankle.  If you have a brace:  · Wear the brace as told by your health care provider. Remove it only as told by your health care provider.  · Loosen the brace if your toes tingle, become numb, or turn cold and blue.  · Keep the brace clean.  · If the brace is not waterproof:  ? Do not let it get wet.  ? Cover it with a watertight covering  when you take a bath or shower.  If you were given an elastic bandage:    · Remove it when you take a bath or a shower.  · Try not to move your ankle very much, but wiggle your toes from time to time. This helps to prevent swelling.  · Adjust the bandage to make it more comfortable if it feels too tight.  · Loosen the bandage if you have numbness or tingling in your foot or if your foot turns cold and blue.  Managing pain, stiffness, and swelling    · If directed, put ice on the painful area.  ? If you have a removable brace or elastic bandage, remove it as told by your health care provider.  ? Put ice in a plastic bag.  ? Place a towel between your skin and the bag.  ? Leave the ice on for 20 minutes, 2-3 times a day.  · Move your toes often to avoid stiffness and to lessen swelling.  · Raise (elevate) your ankle above the level of your heart while you are sitting or lying down.  General instructions  · Record information about your pain. Writing down the following may be helpful for you and your health care provider:  ? How often you have ankle pain.  ? Where the pain is located.  ? What the pain feels like.  · If treatment involves wearing a prescribed shoe or insole, make sure you wear it correctly and for as long as told by your health care provider.  · Take over-the-counter and prescription medicines only as told by your health care provider.  · Keep all follow-up visits as told by your health care provider. This is important.  Contact a health care provider if:  · Your pain gets worse.  · Your pain is not relieved with medicines.  · You have a fever or chills.  · You are having more trouble with walking.  · You have new symptoms.  Get help right away if:  · Your foot, leg, toes, or ankle:  ? Tingles or becomes numb.  ? Becomes swollen.  ? Turns pale or blue.  Summary  · Ankle pain can occur on either side or the back of one ankle or both ankles.  · Ankle pain may be sharp and burning or dull and  aching.  · Rest your ankle as told by your health care provider. If told, apply ice to the area.  · Take over-the-counter and prescription medicines only as told by your health care provider.  This information is not intended to replace advice given to you by your health care provider. Make sure you discuss any questions you have with your health care provider.  Document Released: 06/07/2011 Document Revised: 06/26/2019 Document Reviewed: 06/26/2019  [employer] Interactive Patient Education © 2019 [employer]

## 2019-11-25 ENCOUNTER — CONSULT (OUTPATIENT)
Dept: ORTHOPEDIC SURGERY | Facility: CLINIC | Age: 59
End: 2019-11-25

## 2019-11-25 VITALS — BODY MASS INDEX: 24.04 KG/M2 | HEIGHT: 64 IN | TEMPERATURE: 98.2 F | WEIGHT: 140.8 LBS

## 2019-11-25 DIAGNOSIS — S93.401D SPRAIN OF RIGHT ANKLE, UNSPECIFIED LIGAMENT, SUBSEQUENT ENCOUNTER: ICD-10-CM

## 2019-11-25 DIAGNOSIS — M76.71 PERONEAL TENDONITIS OF RIGHT LOWER LEG: ICD-10-CM

## 2019-11-25 DIAGNOSIS — S92.354A CLOSED NONDISPLACED FRACTURE OF FIFTH METATARSAL BONE OF RIGHT FOOT, INITIAL ENCOUNTER: ICD-10-CM

## 2019-11-25 DIAGNOSIS — Z09 FOLLOW UP: Primary | ICD-10-CM

## 2019-11-25 PROBLEM — S93.401A SPRAIN OF RIGHT ANKLE: Status: ACTIVE | Noted: 2019-11-25

## 2019-11-25 PROCEDURE — 73630 X-RAY EXAM OF FOOT: CPT | Performed by: ORTHOPAEDIC SURGERY

## 2019-11-25 PROCEDURE — 99214 OFFICE O/P EST MOD 30 MIN: CPT | Performed by: ORTHOPAEDIC SURGERY

## 2019-11-25 NOTE — PROGRESS NOTES
"Foot Follow Up      Patient: Aleja Garcia    YOB: 1960 59 y.o. female    Chief Complaints: Foot pain    History of Present Illness: Patient injured her right foot and ankle on 2019 when she got up in her foot was asleep.  She had an inversion injury to her right foot and ankle she was seen initially by her PCP and subsequently seen by Xuan on 2019 and has since been using a boot but no cane.    He does have a history of some type of neuropathy otherwise 14 years in her feet and ankles that occurred after she had childbirth in urgent  she been worked up by neurologist with nerve studies but has had nothing further done for it.    She has not had any worsening of the numbness or neuropathy in her feet.    She is still grossly sensate light touch and reports only mild aching pain in the inferolateral aspect of the right hindfoot along the fifth metatarsal more so than at the ankle.  She states that she feels like her foot has always \"gone out some meaning actually rotated until it been exacerbated a little bit in the boot but no acute change.  HPI    ROS: Foot pain, numbness  Past Medical History:   Diagnosis Date   • Allergic rhinitis 2017   • Atypical mole 2019    Mole with melanocytic atypia removed from back with MOHS procedure. Followed by Dermatologist.   • Breast cancer (CMS/HCC) 2019    Right- pat states that she did not have cancer    • History of Papanicolaou smear of cervix     Never Had Dysplasia on a Pap Smear.  [id number], Normal Annual Pap Smears.  , Bx of a Small Granular Tuft of Tissue at 5:00 = Chronic Cervicitis and Sq Metaplasia.  , Neg Pap Smear, Neg HR-HPV.  2018, ASCUS, Neg HR-HPV.   • Infectious viral hepatitis     A   • Miscarriage  and     2 spontaneous ABs, D&C for both.   • Night sweats     mild   • Post-menopause on HRT (hormone replacement therapy)     Continuously on HRT since about    • Psoriasis    • Vaginal delivery  " "   x2 1985  baby girl \"BROCK\"    2000 baby girl \"SUNITHA\"   • Vitamin D deficiency 02/24/2017    Takes 1000 IU / day.     Physical Exam:   Vitals:    11/25/19 0845   Temp: 98.2 °F (36.8 °C)   Weight: 63.9 kg (140 lb 12.8 oz)   Height: 162.6 cm (64\")     Well developed with normal mood.  She has relatively well-maintained arch but some pes planus with external rotation of the foot and ankle.  Only slight discomfort to palpation along the base of the fifth metatarsal mild discomfort on the inferolateral aspect of the ankle along the peroneal tendons and slight discomfort anterolaterally but no gross instability on anterior drawer on the right compared to the left.  No exacerbation of pain with resisted eversion no subluxation of the peroneal tendons    Radiology: 3 views of the right foot ordered to evaluate fifth metatarsal base fracture reviewed and compared with x-rays of the right foot and ankle on the [employer] system from 11/5/2019 the fifth metatarsal base fracture is still evident and he has some slight displacement compared with previous views this may be some resorption at the fracture site but nothing major.  No other obvious fractures or malalignment noted.  No obvious fractures or malalignment noted on the ankle x-rays from early November      Assessment/Plan: 1.  Right fifth metatarsal base fracture  Right ankle sprain with probable peroneal tendinitis    Reviewed with her that as her symptoms are improving on the ankle we will hold off on MRI with which she is in agreement.  Were also reviewed with her that I would not recommend any surgical treatment for the fifth metatarsal fracture despite the current findings and as long as she is having some clinical improvement.  If ankle symptoms worsen clinically we could get an MRI    Have her continue with her boot and limit activities for now and I will see her back in 2 weeks x-rays of her right foot.    X-ray her right ankle if she is having any pain " there.    At that point if she is improving may start transitioning her out of the boot into a stiff sturdy accommodative shoe.  If anything worsens in the interim she will let me know  Answers for HPI/ROS submitted by the patient on 11/18/2019   Lower extremity pain  Incident occurred: more than 1 week ago  Incident location: at home  Injury mechanism: a fall  Pain location: right foot  Pain quality: aching  Pain - numeric: 2/10  Pain course: fluctuating  tingling: Yes  inability to bear weight: No  loss of motion: No  loss of sensation: Yes  muscle weakness: No  Foreign body present: no foreign bodies

## 2019-11-26 ENCOUNTER — OFFICE VISIT (OUTPATIENT)
Dept: ONCOLOGY | Facility: CLINIC | Age: 59
End: 2019-11-26

## 2019-11-26 ENCOUNTER — LAB (OUTPATIENT)
Dept: LAB | Facility: HOSPITAL | Age: 59
End: 2019-11-26

## 2019-11-26 VITALS
HEIGHT: 63 IN | OXYGEN SATURATION: 96 % | SYSTOLIC BLOOD PRESSURE: 131 MMHG | TEMPERATURE: 98.2 F | DIASTOLIC BLOOD PRESSURE: 79 MMHG | BODY MASS INDEX: 24.93 KG/M2 | RESPIRATION RATE: 16 BRPM | HEART RATE: 77 BPM | WEIGHT: 140.7 LBS

## 2019-11-26 DIAGNOSIS — N60.91 ATYPICAL DUCTAL HYPERPLASIA OF RIGHT BREAST: Primary | ICD-10-CM

## 2019-11-26 DIAGNOSIS — N60.91 ATYPICAL DUCTAL HYPERPLASIA OF RIGHT BREAST: ICD-10-CM

## 2019-11-26 LAB
ALBUMIN SERPL-MCNC: 4.7 G/DL (ref 3.5–5.2)
ALBUMIN/GLOB SERPL: 1.9 G/DL (ref 1.1–2.4)
ALP SERPL-CCNC: 106 U/L (ref 38–116)
ALT SERPL W P-5'-P-CCNC: 16 U/L (ref 0–33)
ANION GAP SERPL CALCULATED.3IONS-SCNC: 11.8 MMOL/L (ref 5–15)
AST SERPL-CCNC: 17 U/L (ref 0–32)
BASOPHILS # BLD AUTO: 0.11 10*3/MM3 (ref 0–0.2)
BASOPHILS NFR BLD AUTO: 2 % (ref 0–1.5)
BILIRUB SERPL-MCNC: 0.4 MG/DL (ref 0.2–1.2)
BUN BLD-MCNC: 17 MG/DL (ref 6–20)
BUN/CREAT SERPL: 21 (ref 7.3–30)
CALCIUM SPEC-SCNC: 9.9 MG/DL (ref 8.5–10.2)
CHLORIDE SERPL-SCNC: 103 MMOL/L (ref 98–107)
CO2 SERPL-SCNC: 29.2 MMOL/L (ref 22–29)
CREAT BLD-MCNC: 0.81 MG/DL (ref 0.6–1.1)
DEPRECATED RDW RBC AUTO: 39 FL (ref 37–54)
EOSINOPHIL # BLD AUTO: 0.1 10*3/MM3 (ref 0–0.4)
EOSINOPHIL NFR BLD AUTO: 1.8 % (ref 0.3–6.2)
ERYTHROCYTE [DISTWIDTH] IN BLOOD BY AUTOMATED COUNT: 11.7 % (ref 12.3–15.4)
GFR SERPL CREATININE-BSD FRML MDRD: 73 ML/MIN/1.73
GLOBULIN UR ELPH-MCNC: 2.5 GM/DL (ref 1.8–3.5)
GLUCOSE BLD-MCNC: 87 MG/DL (ref 74–124)
HCT VFR BLD AUTO: 46.7 % (ref 34–46.6)
HGB BLD-MCNC: 15.3 G/DL (ref 12–15.9)
IMM GRANULOCYTES # BLD AUTO: 0.01 10*3/MM3 (ref 0–0.05)
IMM GRANULOCYTES NFR BLD AUTO: 0.2 % (ref 0–0.5)
LYMPHOCYTES # BLD AUTO: 1.73 10*3/MM3 (ref 0.7–3.1)
LYMPHOCYTES NFR BLD AUTO: 31.9 % (ref 19.6–45.3)
MCH RBC QN AUTO: 29.7 PG (ref 26.6–33)
MCHC RBC AUTO-ENTMCNC: 32.8 G/DL (ref 31.5–35.7)
MCV RBC AUTO: 90.7 FL (ref 79–97)
MONOCYTES # BLD AUTO: 0.38 10*3/MM3 (ref 0.1–0.9)
MONOCYTES NFR BLD AUTO: 7 % (ref 5–12)
NEUTROPHILS # BLD AUTO: 3.09 10*3/MM3 (ref 1.7–7)
NEUTROPHILS NFR BLD AUTO: 57.1 % (ref 42.7–76)
NRBC BLD AUTO-RTO: 0 /100 WBC (ref 0–0.2)
PLATELET # BLD AUTO: 241 10*3/MM3 (ref 140–450)
PMV BLD AUTO: 9.9 FL (ref 6–12)
POTASSIUM BLD-SCNC: 4 MMOL/L (ref 3.5–4.7)
PROT SERPL-MCNC: 7.2 G/DL (ref 6.3–8)
RBC # BLD AUTO: 5.15 10*6/MM3 (ref 3.77–5.28)
SODIUM BLD-SCNC: 144 MMOL/L (ref 134–145)
WBC NRBC COR # BLD: 5.42 10*3/MM3 (ref 3.4–10.8)

## 2019-11-26 PROCEDURE — 80053 COMPREHEN METABOLIC PANEL: CPT

## 2019-11-26 PROCEDURE — 99214 OFFICE O/P EST MOD 30 MIN: CPT | Performed by: INTERNAL MEDICINE

## 2019-11-26 PROCEDURE — 36415 COLL VENOUS BLD VENIPUNCTURE: CPT

## 2019-11-26 PROCEDURE — 85025 COMPLETE CBC W/AUTO DIFF WBC: CPT

## 2019-11-26 RX ORDER — RALOXIFENE HYDROCHLORIDE 60 MG/1
60 TABLET, FILM COATED ORAL DAILY
Qty: 90 TABLET | Refills: 3 | Status: SHIPPED | OUTPATIENT
Start: 2019-11-26 | End: 2020-12-01

## 2019-11-26 NOTE — PROGRESS NOTES
Subjective     REASON FOR CONSULTATION:   1.Atypical ductal hyperplasia right breast postLumpectomy  2.  Osteopenia preventative Evista started in 1/20                               REQUESTING PHYSICIAN: MD Stuart Ge MD      History of Present Illness patient is a 59-year-old postmenopausal lady with recent diagnosis of ADH made on a right breast biopsy.    She has had bilateral breast MRIs done a month ago which was negative and bone density testing that showed moderate osteopenia in her hips and spine.    She got up quickly from sitting with numbness in her left foot and she twisted and broke her toe and is in a boot for about 3 more weeks but other than that she is doing well    We again discussed the rationale for prevention and she is agreeable as long as she has no side effects and I think this is reasonable    We have opted to start with Evista 60 mg daily we discussed the side effect profile including hot flashes some bone stiffness and very minuscule risk of DVT  And she is agreeable and I prescribed the drug for    She knows to call before her next appointment if she has side effects and we could consider low-dose tamoxifen 5 mg if she does not tolerate this    Past Medical History:   Diagnosis Date   • Allergic rhinitis 2/24/2017   • Atypical mole 05/2019    Mole with melanocytic atypia removed from back with MOHS procedure. Followed by Dermatologist.   • Breast cancer (CMS/HCC) 2019    Right- pat states that she did not have cancer    • History of Papanicolaou smear of cervix 2013    Never Had Dysplasia on a Pap Smear.  [id number], Normal Annual Pap Smears.  2013, Bx of a Small Granular Tuft of Tissue at 5:00 = Chronic Cervicitis and Sq Metaplasia.  2013, Neg Pap Smear, Neg HR-HPV.  2018, ASCUS, Neg HR-HPV.   • Infectious viral hepatitis     A   • Miscarriage 1998 and 1999    2 spontaneous ABs, D&C for both.   • Night sweats      "mild   • Post-menopause on HRT (hormone replacement therapy)     Continuously on HRT since about    • Psoriasis    • Vaginal delivery     x2   baby girl \"BROCK\"     baby girl \"SUNITHA\"   • Vitamin D deficiency 2017    Takes 1000 IU / day.        Past Surgical History:   Procedure Laterality Date   • BREAST BIOPSY      Path= Negative. RIGHT   • BREAST BIOPSY Right 2019    Procedure: BREAST BIOPSY WITH NEEDLE LOCALIZATION;  Surgeon: Andrew Pham MD;  Location: [facility] OR [facility] – [place], [place];  Service: General   •  SECTION      baby boy \"PETTY\" ; CS for placenta previa.   • COLONOSCOPY  [id number]    Normal @ 51. Repeat in ten years.   • DIAGNOSTIC LAPAROSCOPY      Dr. Fabain Al, hysteroscopy w/ separtion of minimal intrauterine septum.  Laparoscopy w/ CO2 laser, lysis of adhesions and ablation of endometriosis.  Endometriosis found in the lateral LEFT pelvic sidewall, peritoneum and deeper endometriosis of the LEFT and RIGHT uterosacral ligaments.  Complex superficial endometriosis of the LEFT and RIGHT ovary.Fallopian tubes were patent with methyleneblue   • DILATATION AND CURETTAGE  1998    x2       • MOLE REMOVAL  2019   • WISDOM TOOTH EXTRACTION        ONC HISTORY:   patient is a 58-year-old female with no chronic medical illnesses on no chronic medications who is just come off 5 years of hormone replacement therapy after menopause for menopausal symptoms after a recent mammogram showed an abnormality.  Patient's mammogram in March of last year was benign and this year there was a 16 mm area of abnormality that required further evaluation and a biopsy was done on  2019 A biopsy was done at the 3:30 position of the right breast showing atypical ductal hyperplasia : With microcalcifications clustered in these areas and in benign breast parenchyma there was also a radial scar.   Patient went on to have a lumpectomy on 2019 with radial scar and ductal hyperplasia " of the usual type and no other abnormalities.  She has been referred to us to discuss chemoprevention    She is  5 para 3 with 2 miscarriages first childbirth was at age 34 she breast-fed all 3 children menarche was at age 15 menopause at age 51 and she has been on estradiol since menopause and stopped and her biopsy showed atypical ductal hyperplasia last month    There is no family history of breast or ovarian cancer.  She has a maternal uncle  of testicular cancer as a young age      I calculated her lifetime risk of breast cancer based on the Caterina model and is calculated to a 5-year risk of 4.6% of the lifetime risk of 26%.  At this time she is still dealing with withdrawal symptoms from  stopping her hormone replacement and I think we can afford to wait for a while to begin this.  She would like to wait until she has a breast MRI in October and I have suggested she have a DEXA scan to see if there is concomitant osteopenia or osteoporosis which would also benefit from Evista which I think is the easiest medication to offer her        Current Outpatient Medications on File Prior to Visit   Medication Sig Dispense Refill   • calcium carbonate (OS-MARCO A) 600 MG tablet Take 600 mg by mouth Daily.     • fluocinonide (LIDEX) 0.05 % ointment Apply  topically to the appropriate area as directed As Needed (psoriasis).     • fluticasone (FLONASE) 50 MCG/ACT nasal spray 2 sprays into each nostril Daily.     • loratadine (CLARITIN) 10 MG tablet Take 10 mg by mouth Daily.     • melatonin 3 MG tablet Take 3 mg by mouth At Night As Needed for Sleep.     • Multiple Vitamins-Minerals (WOMENS MULTIVITAMIN PLUS PO) Take 1 tablet by mouth Daily.     • Probiotic Product (PROBIOTIC ACIDOPHILUS BEADS) capsule Take 1 tablet by mouth Daily.     • Vitamin D, Cholecalciferol, 1000 units capsule Take 1,000 Units by mouth Daily.       No current facility-administered medications on file prior to visit.         ALLERGIES:  No Known  Allergies     Social History     Socioeconomic History   • Marital status:      Spouse name: JOSE D   • Number of children: 3   • Years of education: College   • Highest education level: Not on file   Occupational History   • Occupation:      Employer: SELF-EMPLOYED   Tobacco Use   • Smoking status: Never Smoker   • Smokeless tobacco: Never Used   Substance and Sexual Activity   • Alcohol use: Yes     Frequency: Monthly or less     Comment: social    • Drug use: No   • Sexual activity: Yes     Partners: Male     Birth control/protection: Post-menopausal     Comment: SPOUSE =  JOSE D         Family History   Problem Relation Age of Onset   • Dementia Mother 75        Alzheimers Diseasse.   • Cataracts Mother    • Hyperlipidemia Father    • Hypertension Father    • Heart valve disorder Father         valve repair    • COPD Father         non-smoker   • Cataracts Father    • Melanoma Father    • Seizures Sister 48   • Cataracts Maternal Grandmother    • Osteoarthritis Maternal Grandmother    • Osteoarthritis Paternal Grandmother    • No Known Problems Daughter    • Down syndrome Daughter    • Rheum arthritis Daughter         remission    • ADD / ADHD Son    • Other Son         Human Growth Hormone deficiency   • No Known Problems Brother         adopted    • No Known Problems Maternal Grandfather          in his 80's.   • No Known Problems Paternal Grandfather          at ~ 89.   • No Known Problems Sister    • Diabetes type II Maternal Aunt    • Diabetes Maternal Aunt    • Testicular cancer Maternal Uncle    • Heart attack Paternal Aunt    • BRCA 1/2 Neg Hx    • Breast cancer Neg Hx    • Colon cancer Neg Hx    • Endometrial cancer Neg Hx    • Ovarian cancer Neg Hx    • Malig Hyperthermia Neg Hx         Review of Systems   Constitutional: Negative for diaphoresis (stable 19) and fatigue.   Respiratory: Negative for chest tightness and shortness of breath.    Cardiovascular: Positive for  "leg swelling (Chronic ankle edema for 12 years same 11/26/19). Negative for chest pain.   Musculoskeletal: Positive for arthralgias (right foot fracture 11/26/19).   Neurological: Positive for numbness (Peripheral neuropathy in both legs since the birth of her third child 13 years ago etiology unclear same 11/26/19) and headaches (same 11/26/19).        Objective     Vitals:    11/26/19 1104   BP: 131/79   Pulse: 77   Resp: 16   Temp: 98.2 °F (36.8 °C)   SpO2: 96%   Weight: 63.8 kg (140 lb 11.2 oz)  Comment: walking boot   Height: 161 cm (63.39\")   PainSc:   2   PainLoc: Foot  Comment: right foot fracture     Current Status 11/26/2019   ECOG score 0       Physical Exam    GENERAL:  Well-developed, well-nourished in no acute distress.   SKIN:  Warm, dry without rashes, purpura or petechiae.  Atypical nevus left scapular area and right flank  EYES:  Pupils equal, round and reactive to light.  EOMs intact.  Conjunctivae normal.  EARS:  Hearing intact.  NOSE:  Septum midline.  No excoriations or nasal discharge.  MOUTH:  Tongue is well-papillated; no stomatitis or ulcers.  Lips normal.  THROAT:  Oropharynx without lesions or exudates.  NECK:  Supple with good range of motion; no thyromegaly or masses, no JVD.  LYMPHATICS:  No cervical, supraclavicular, axillary or inguinal adenopathy.  CHEST:  Lungs clear to auscultation. Good airflow.  BREASTS: Right breast shows lumpectomy scar in the lower inner quadrant well-healed - left breast is benign  CARDIAC:  Regular rate and rhythm without murmurs, rubs or gallops. Normal S1,S2.  ABDOMEN:  Soft, nontender with no hepatosplenomegaly or masses.  EXTREMITIES:  No clubbing, cyanosis -left foot is in a boot after toe fracture.  NEUROLOGICAL:  Cranial Nerves II-XII grossly intact.  No focal neurological deficits.  PSYCHIATRIC:  Normal affect and mood.        RECENT LABS:  Hematology WBC   Date Value Ref Range Status   11/26/2019 5.42 3.40 - 10.80 10*3/mm3 Final     RBC   Date " Value Ref Range Status   11/26/2019 5.15 3.77 - 5.28 10*6/mm3 Final     Hemoglobin   Date Value Ref Range Status   11/26/2019 15.3 12.0 - 15.9 g/dL Final     Hematocrit   Date Value Ref Range Status   11/26/2019 46.7 (H) 34.0 - 46.6 % Final     Platelets   Date Value Ref Range Status   11/26/2019 241 140 - 450 10*3/mm3 Final        Study Result     BONE MINERAL DENSITOMETRY   IMPRESSION:  Osteopenia.     This report was finalized on 7/8/2019 3:22 PM by Dr. Darrin Perez M.D.   Bilateral breast MRI  IMPRESSION:  There are no findings suspicious for malignancy in either  breast. Routine follow-up imaging is recommended.     BI-RADS Category 2: Benign.     This report was finalized on 9/16/2019     Assessment/Plan   1.  Atypical ductal hyperplasia multifocal right breast postlumpectomy  · Evista 60 mg to start in 1/20    2 atypical nevus.  With high-grade dysplasia removed from her back    3.  Peripheral neuropathy of unknown etiology post third childbirth    4.  Osteopenia with a fracture of her left small toe    Plan  1.  Evista 60 mg p.o. Daily  2.  follow-up in 6 months to see me she knows to call before then if she has significant side effects

## 2019-12-04 NOTE — PROGRESS NOTES
Subjective   Chief Complaint   Patient presents with   • Annual Exam       History of Present Illness     58 yo wf presents for her annual physical exam.     P/P UTD with Dr. Geiger 3/12/19. MMG UTD but abnormal (atypical ductal hyperplasia R-breast). MRI breast in October negative. Followed by both Dr. Pham and Dr. Llamas. She has started Evista at Dr. Llamas's recommendation.      DXA scan showed moderate osteopenia in hips and spine.     C-scope UTD and due to be repeated in 2022    She had her flu shot in November.     She hasn't had SG yet. Needs 2nd Havrix today.      Patient Active Problem List   Diagnosis   • Insomnia   • Allergic rhinitis   • Peripheral neuropathy   • Vitamin D deficiency   • Atypical ductal hyperplasia of right breast   • Peroneal tendonitis of right lower leg   • Sprain of right ankle   • Closed nondisplaced fracture of fifth right metatarsal bone       No Known Allergies    Current Outpatient Medications on File Prior to Visit   Medication Sig Dispense Refill   • calcium carbonate (OS-MARCO A) 600 MG tablet Take 600 mg by mouth Daily.     • fluocinonide (LIDEX) 0.05 % ointment Apply  topically to the appropriate area as directed As Needed (psoriasis).     • fluticasone (FLONASE) 50 MCG/ACT nasal spray 2 sprays into each nostril Daily.     • loratadine (CLARITIN) 10 MG tablet Take 10 mg by mouth Daily.     • melatonin 3 MG tablet Take 3 mg by mouth At Night As Needed for Sleep.     • Multiple Vitamins-Minerals (WOMENS MULTIVITAMIN PLUS PO) Take 1 tablet by mouth Daily.     • Probiotic Product (PROBIOTIC ACIDOPHILUS BEADS) capsule Take 1 tablet by mouth Daily.     • Vitamin D, Cholecalciferol, 1000 units capsule Take 1,000 Units by mouth Daily.     • raloxifene (EVISTA) 60 MG tablet Take 1 tablet by mouth Daily. 90 tablet 3     No current facility-administered medications on file prior to visit.        Past Medical History:   Diagnosis Date   • Allergic rhinitis 2/24/2017   • Atypical mole  "2019    Mole with melanocytic atypia removed from back with MOHS procedure. Followed by Dermatologist.   • Breast cancer (CMS/HCC) 2019    Right- pat states that she did not have cancer    • History of Papanicolaou smear of cervix     Never Had Dysplasia on a Pap Smear.  [id number], Normal Annual Pap Smears.  , Bx of a Small Granular Tuft of Tissue at 5:00 = Chronic Cervicitis and Sq Metaplasia.  , Neg Pap Smear, Neg HR-HPV.  , ASCUS, Neg HR-HPV.   • Infectious viral hepatitis     A   • Miscarriage  and     2 spontaneous ABs, D&C for both.   • Night sweats     mild   • Post-menopause on HRT (hormone replacement therapy)     Continuously on HRT since about    • Psoriasis    • Vaginal delivery     x2   baby girl \"BROCK\"     baby girl \"SUNITHA\"   • Vitamin D deficiency 2017    Takes 1000 IU / day.       Family History   Problem Relation Age of Onset   • Dementia Mother 75        Alzheimers Diseasse.   • Cataracts Mother    • Hyperlipidemia Father    • Hypertension Father    • Heart valve disorder Father         valve repair    • COPD Father         non-smoker   • Cataracts Father    • Melanoma Father    • Seizures Sister 48   • Cataracts Maternal Grandmother    • Osteoarthritis Maternal Grandmother    • Osteoarthritis Paternal Grandmother    • No Known Problems Daughter    • Down syndrome Daughter    • Rheum arthritis Daughter         remission    • ADD / ADHD Son    • Other Son         Human Growth Hormone deficiency   • No Known Problems Brother         adopted    • No Known Problems Maternal Grandfather          in his 80's.   • No Known Problems Paternal Grandfather          at ~ 89.   • No Known Problems Sister    • Diabetes type II Maternal Aunt    • Diabetes Maternal Aunt    • Testicular cancer Maternal Uncle    • Heart attack Paternal Aunt    • BRCA 1/2 Neg Hx    • Breast cancer Neg Hx    • Colon cancer Neg Hx    • Endometrial cancer Neg Hx    • Ovarian cancer " "Neg Hx    • Malig Hyperthermia Neg Hx        Social History     Socioeconomic History   • Marital status:      Spouse name: JOSE D   • Number of children: 3   • Years of education: College   • Highest education level: Not on file   Occupational History   • Occupation:      Employer: SELF-EMPLOYED   Tobacco Use   • Smoking status: Never Smoker   • Smokeless tobacco: Never Used   Substance and Sexual Activity   • Alcohol use: Yes     Frequency: Monthly or less     Comment: social    • Drug use: No   • Sexual activity: Yes     Partners: Male     Birth control/protection: Post-menopausal     Comment: SPOUSE =  JOSE D        Past Surgical History:   Procedure Laterality Date   • BREAST BIOPSY      Path= Negative. RIGHT   • BREAST BIOPSY Right 2019    Procedure: BREAST BIOPSY WITH NEEDLE LOCALIZATION;  Surgeon: Andrew Pham MD;  [place] [facility] [place] [facility] [place]  Service: General   •  SECTION      baby boy \"PETTY\" ; CS for placenta previa.   • COLONOSCOPY  [id number]    Normal @ 50. Repeat in ten years.   • DIAGNOSTIC LAPAROSCOPY      Dr. Fabian Al, hysteroscopy w/ separtion of minimal intrauterine septum.  Laparoscopy w/ CO2 laser, lysis of adhesions and ablation of endometriosis.  Endometriosis found in the lateral LEFT pelvic sidewall, peritoneum and deeper endometriosis of the LEFT and RIGHT uterosacral ligaments.  Complex superficial endometriosis of the LEFT and RIGHT ovary.Fallopian tubes were patent with methyleneblue   • DILATATION AND CURETTAGE  1998    x2       • MOLE REMOVAL  2019   • WISDOM TOOTH EXTRACTION         The following portions of the patient's history were reviewed and updated as appropriate: problem list, allergies, current medications, past medical history, past family history, past social history and past surgical history.    Review of Systems   Constitutional: Negative for fatigue.   HENT: Negative for congestion.    Eyes: Negative for visual " "disturbance.   Respiratory: Negative for shortness of breath.    Cardiovascular: Negative for chest pain.   Gastrointestinal: Negative for blood in stool.   Endocrine: Negative.    Genitourinary: Negative.    Musculoskeletal:        Fracture--foot.    Skin: Negative for rash.   Allergic/Immunologic: Negative for environmental allergies.   Neurological: Negative for headache.   Hematological: Does not bruise/bleed easily.   Psychiatric/Behavioral: The patient is not nervous/anxious.        Immunization History   Administered Date(s) Administered   • Hepatitis A 06/07/2019, 12/06/2019   • Tdap 06/07/2019       Objective   Vitals:    12/06/19 0818 12/06/19 0842   BP:  124/76   Pulse:  64   Resp:  12   Weight: 61.2 kg (135 lb)    Height: 162.6 cm (64\")      Body mass index is 23.17 kg/m².  Physical Exam   Constitutional: She is oriented to person, place, and time. She appears well-developed and well-nourished.   HENT:   Head: Normocephalic and atraumatic.   Right Ear: External ear normal.   Left Ear: External ear normal.   Nose: Nose normal.   Mouth/Throat: Oropharynx is clear and moist.   Eyes: Conjunctivae and EOM are normal. Pupils are equal, round, and reactive to light.   Neck: Neck supple. Carotid bruit is not present. No thyromegaly present.   Cardiovascular: Normal rate, regular rhythm, normal heart sounds and intact distal pulses.   No murmur heard.  Pulmonary/Chest: Effort normal and breath sounds normal.   Abdominal: Soft. Bowel sounds are normal. She exhibits no distension and no mass. There is no hepatosplenomegaly. There is no tenderness. There is no rebound.   Musculoskeletal:   Ambulates without assistance; no clubbing, cyanosis or edema.    Lymphadenopathy:     She has no cervical adenopathy.   Neurological: She is alert and oriented to person, place, and time.   Skin: Skin is warm and dry.   Psychiatric: She has a normal mood and affect.   Vitals reviewed.      Procedures    Assessment/Plan   Aleja was " seen today for annual exam.    Diagnoses and all orders for this visit:    Annual physical exam  Comments:  Encouraged 150 minutes weekly aerobic activity.     Healthcare maintenance  -     Lipid Panel With / Chol / HDL Ratio; Future  -     Comprehensive Metabolic Panel; Future    Vitamin D deficiency  -     Vitamin D 25 Hydroxy; Future    Need for hepatitis A vaccination  Comments:  Today.     Records reviewed include previous OV with myself as well as labs and imaging.     Return in about 1 year (around 12/6/2020).

## 2019-12-06 ENCOUNTER — OFFICE VISIT (OUTPATIENT)
Dept: INTERNAL MEDICINE | Facility: CLINIC | Age: 59
End: 2019-12-06

## 2019-12-06 VITALS
DIASTOLIC BLOOD PRESSURE: 76 MMHG | RESPIRATION RATE: 12 BRPM | WEIGHT: 135 LBS | HEIGHT: 64 IN | HEART RATE: 64 BPM | BODY MASS INDEX: 23.05 KG/M2 | SYSTOLIC BLOOD PRESSURE: 124 MMHG

## 2019-12-06 DIAGNOSIS — Z23 NEED FOR HEPATITIS A VACCINATION: ICD-10-CM

## 2019-12-06 DIAGNOSIS — E55.9 VITAMIN D DEFICIENCY: ICD-10-CM

## 2019-12-06 DIAGNOSIS — Z00.00 HEALTHCARE MAINTENANCE: ICD-10-CM

## 2019-12-06 DIAGNOSIS — Z23 NEED FOR HEPATITIS A VACCINATION: Primary | ICD-10-CM

## 2019-12-06 DIAGNOSIS — Z00.00 ANNUAL PHYSICAL EXAM: Primary | ICD-10-CM

## 2019-12-06 PROCEDURE — 90471 IMMUNIZATION ADMIN: CPT | Performed by: NURSE PRACTITIONER

## 2019-12-06 PROCEDURE — 99396 PREV VISIT EST AGE 40-64: CPT | Performed by: NURSE PRACTITIONER

## 2019-12-06 PROCEDURE — 90632 HEPA VACCINE ADULT IM: CPT | Performed by: NURSE PRACTITIONER

## 2019-12-11 ENCOUNTER — OFFICE VISIT (OUTPATIENT)
Dept: ORTHOPEDIC SURGERY | Facility: CLINIC | Age: 59
End: 2019-12-11

## 2019-12-11 VITALS — WEIGHT: 135 LBS | TEMPERATURE: 98 F | HEIGHT: 64 IN | BODY MASS INDEX: 23.05 KG/M2

## 2019-12-11 DIAGNOSIS — S93.401D SPRAIN OF RIGHT ANKLE, UNSPECIFIED LIGAMENT, SUBSEQUENT ENCOUNTER: ICD-10-CM

## 2019-12-11 DIAGNOSIS — S92.354D CLOSED NONDISPLACED FRACTURE OF FIFTH METATARSAL BONE OF RIGHT FOOT WITH ROUTINE HEALING, SUBSEQUENT ENCOUNTER: ICD-10-CM

## 2019-12-11 DIAGNOSIS — M76.71 PERONEAL TENDONITIS OF RIGHT LOWER LEG: ICD-10-CM

## 2019-12-11 DIAGNOSIS — Z09 FOLLOW UP: Primary | ICD-10-CM

## 2019-12-11 PROCEDURE — 73630 X-RAY EXAM OF FOOT: CPT | Performed by: ORTHOPAEDIC SURGERY

## 2019-12-11 PROCEDURE — 99212 OFFICE O/P EST SF 10 MIN: CPT | Performed by: ORTHOPAEDIC SURGERY

## 2019-12-11 NOTE — PROGRESS NOTES
"Foot Follow Up      Patient: Aleja Garcia    YOB: 1960 59 y.o. female    Chief Complaints: Foot not really hurting    History of Present Illness: Patient follows up injured her right foot and ankle from 11/4/2019 when she got up in her foot was asleep.  She was seen initially by Xuan after evaluation by her PCP and I saw her initially on 11/25/2019 for a right ankle sprain with peroneal tendinitis and fifth metatarsal base fracture.    She has a history of neuropathy in her feet for last 14 years after childbirth with C-sections and has been worked up by neurologist with nerve studies but nothing further done.  She denies any worsening of the numbness or neuropathy of her feet.    She has been using a boot but not really using crutches and states that her foot really is not hurting or bothering her and feels better than it did at her last visit.  Said that her ankle feels more stable when she has been in the shower and has been able to walk around the house some at night without her boot to the bathroom and back without pain.    HPI    ROS: No foot pain  Past Medical History:   Diagnosis Date   • Allergic rhinitis 2/24/2017   • Atypical mole 05/2019    Mole with melanocytic atypia removed from back with MOHS procedure. Followed by Dermatologist.   • Breast cancer (CMS/HCC) 2019    Right- pat states that she did not have cancer    • History of Papanicolaou smear of cervix 2013    Never Had Dysplasia on a Pap Smear.  [id number], Normal Annual Pap Smears.  2013, Bx of a Small Granular Tuft of Tissue at 5:00 = Chronic Cervicitis and Sq Metaplasia.  2013, Neg Pap Smear, Neg HR-HPV.  2018, ASCUS, Neg HR-HPV.   • Infectious viral hepatitis     A   • Miscarriage 1998 and 1999    2 spontaneous ABs, D&C for both.   • Night sweats     mild   • Post-menopause on HRT (hormone replacement therapy)     Continuously on HRT since about 2015   • Psoriasis    • Vaginal delivery     x2 1985  baby girl \"BROCK\"    2000 " "baby girl \"SUNITHA\"   • Vitamin D deficiency 02/24/2017    Takes 1000 IU / day.     Physical Exam:   Vitals:    12/11/19 1049   Temp: 98 °F (36.7 °C)   Weight: 61.2 kg (135 lb)   Height: 162.6 cm (64\")     Well developed with normal mood.  Right foot shows minimal discomfort over the anterolateral ligamentous structures with stable anterior drawer no focal pain on the peroneal tendons.  Really no focal discomfort at the base the fifth metatarsal or with 5 out of 5 eversion strength      Radiology: 3 views of the right foot ordered evaluate fifth metatarsal base fracture reviewed and compared with previous x-rays.  There is been no appreciable displacement of the fracture borders appear somewhat sclerotic do not see much in way of callus at this point.      Assessment/Plan:  1.  Right fifth metatarsal base fracture  2.Right ankle sprain with probable peroneal tendinitis  Her ankle symptoms are improving so would not recommend any further work-up.  If symptoms persist or worsen we would need to get an MRI.    Regarding her fifth metatarsal base fracture reviewed with her  That although it does not appear to be healing it is not symptomatic and I would not recommend any surgical treatment at this time.  She may be an asymptomatic nonunion as long as is asymptomatic I would not do anything with it and if it becomes symptomatic it would require excision of the fragment as it is too small for internal fixation.    I have her continue with her boot for another 10 to 14 days then wean out of it into an athletic shoe initially around the house for a week and then altogether.    She will avoid barefoot ambulation    Anything worsens to get off this and let me know otherwise I will see her back in 4 weeks x-rays of her right foot  "

## 2020-01-08 ENCOUNTER — OFFICE VISIT (OUTPATIENT)
Dept: ORTHOPEDIC SURGERY | Facility: CLINIC | Age: 60
End: 2020-01-08

## 2020-01-08 VITALS — BODY MASS INDEX: 23.05 KG/M2 | HEIGHT: 64 IN | WEIGHT: 135 LBS | TEMPERATURE: 98.3 F

## 2020-01-08 DIAGNOSIS — S93.401D SPRAIN OF RIGHT ANKLE, UNSPECIFIED LIGAMENT, SUBSEQUENT ENCOUNTER: ICD-10-CM

## 2020-01-08 DIAGNOSIS — Z09 FOLLOW UP: Primary | ICD-10-CM

## 2020-01-08 DIAGNOSIS — S92.354D CLOSED NONDISPLACED FRACTURE OF FIFTH METATARSAL BONE OF RIGHT FOOT WITH ROUTINE HEALING, SUBSEQUENT ENCOUNTER: ICD-10-CM

## 2020-01-08 PROCEDURE — 99212 OFFICE O/P EST SF 10 MIN: CPT | Performed by: ORTHOPAEDIC SURGERY

## 2020-01-08 PROCEDURE — 73630 X-RAY EXAM OF FOOT: CPT | Performed by: ORTHOPAEDIC SURGERY

## 2020-01-08 NOTE — PROGRESS NOTES
"Foot Follow Up      Patient: Aleja Garcia    YOB: 1960 59 y.o. female    Chief Complaints: Foot and ankle feels fine    History of Present Illness: Patient follows up injured her right foot and ankle from 11/4/2019 when she got up on her foot was asleep.  I saw her next on 11/25/2019 for right ankle sprain with peroneal tendinitis and fifth metatarsal base fracture.    She has a history of neuropathy in her feet for the last 14 years after childbirth with C-sections and has been worked up extensively elsewhere.    She was last seen on 12/11/2019 stating that she has been an athletic shoe since 1 week after her last visit and her foot and ankle have been fine\" not bothering her at all.  Ankle is not hurting either.  HPI    ROS: No foot pain  Past Medical History:   Diagnosis Date   • Allergic rhinitis 2/24/2017   • Atypical mole 05/2019    Mole with melanocytic atypia removed from back with MOHS procedure. Followed by Dermatologist.   • Breast cancer (CMS/HCC) 2019    Right- pat states that she did not have cancer    • History of Papanicolaou smear of cervix 2013    Never Had Dysplasia on a Pap Smear.  [id number], Normal Annual Pap Smears.  2013, Bx of a Small Granular Tuft of Tissue at 5:00 = Chronic Cervicitis and Sq Metaplasia.  2013, Neg Pap Smear, Neg HR-HPV.  2018, ASCUS, Neg HR-HPV.   • Infectious viral hepatitis     A   • Miscarriage 1998 and 1999    2 spontaneous ABs, D&C for both.   • Night sweats     mild   • Post-menopause on HRT (hormone replacement therapy)     Continuously on HRT since about 2015   • Psoriasis    • Vaginal delivery     x2 1985  baby girl \"BROCK\"    2000 baby girl \"SUNITHA\"   • Vitamin D deficiency 02/24/2017    Takes 1000 IU / day.     Physical Exam:   Vitals:    01/08/20 0818   Temp: 98.3 °F (36.8 °C)   Weight: 61.2 kg (135 lb)   Height: 162.6 cm (64\")     Well developed with normal mood.  Exam she has no focal tenderness over the base of the right fifth metatarsal to " palpation or with resisted eversion.  She is nontender on the anterolateral ligamentous structures nor peroneal tendons at the ankle with 5 out of 5 eversion strength and stable anterior drawer.  She was grossly sensate to light touch throughout the right foot      Radiology: 3 views right foot ordered evaluate fifth metatarsal base fracture reviewed and compared with previous x-rays.  The fifth metatarsal base fracture is still evident but without appreciable change compared with previous x-rays it may be slightly more consolidated than on previous views.      Assessment/Plan:    1.  Right fifth metatarsal base fracture  2.  Resolved right ankle sprain with probable peroneal tendinitis    Reviewed x-rays and treatment options with her today and although the fracture is still evident there is a small avulsion of the base the fifth metatarsal she not having any symptoms I would not recommend surgical treatment for this.  Her ankle symptoms have resolved as well so do not need any further work-up.    She will continue with accommodative sturdy shoes which she does regularly anyway due to her neuropathy and advance activity slowly as tolerated if she has any recurrence of pain or swelling she will let me know otherwise by mutual agreement I will see her back as needed

## 2020-03-17 PROBLEM — S93.401A SPRAIN OF RIGHT ANKLE: Status: RESOLVED | Noted: 2019-11-25 | Resolved: 2020-03-17

## 2020-03-18 ENCOUNTER — OFFICE VISIT (OUTPATIENT)
Dept: OBSTETRICS AND GYNECOLOGY | Age: 60
End: 2020-03-18

## 2020-03-18 ENCOUNTER — PROCEDURE VISIT (OUTPATIENT)
Dept: OBSTETRICS AND GYNECOLOGY | Age: 60
End: 2020-03-18

## 2020-03-18 ENCOUNTER — APPOINTMENT (OUTPATIENT)
Dept: WOMENS IMAGING | Facility: HOSPITAL | Age: 60
End: 2020-03-18

## 2020-03-18 VITALS
HEIGHT: 64 IN | WEIGHT: 136 LBS | DIASTOLIC BLOOD PRESSURE: 72 MMHG | BODY MASS INDEX: 23.22 KG/M2 | SYSTOLIC BLOOD PRESSURE: 122 MMHG

## 2020-03-18 DIAGNOSIS — Z12.31 VISIT FOR SCREENING MAMMOGRAM: Primary | ICD-10-CM

## 2020-03-18 DIAGNOSIS — Z01.419 ENCOUNTER FOR GYNECOLOGICAL EXAMINATION (GENERAL) (ROUTINE) WITHOUT ABNORMAL FINDINGS: Primary | ICD-10-CM

## 2020-03-18 PROCEDURE — 77067 SCR MAMMO BI INCL CAD: CPT | Performed by: RADIOLOGY

## 2020-03-18 PROCEDURE — 77067 SCR MAMMO BI INCL CAD: CPT | Performed by: OBSTETRICS & GYNECOLOGY

## 2020-03-18 PROCEDURE — 99396 PREV VISIT EST AGE 40-64: CPT | Performed by: OBSTETRICS & GYNECOLOGY

## 2020-03-18 NOTE — PROGRESS NOTES
Routine Annual Visit    3/18/2020    Patient: Aleja Garcia          MR#:[id number]      Chief Complaint   Patient presents with   • Gynecologic Exam     AE AND MG BEFORE EXAM TODAY.  FORMER KATH PATIENT.  2019 PAP = NEG/NEG. (BREAST BX IN 2019).  2018 PAP = ASCUS, NEG HPV (CERVICAL BX = B9).  PM STOPPED HRT.  C-SCOPE .  HX OF UI       History of Present Illness    59 y.o. female  who presents for annual exam.   Doing well, no evista with HF and NS but tolerable  Kids well 14- Briggs, 19 Vandana, 24 yo   works for family [employer]  Father with parkinsons and pulmonary hypertension  Mother with [place]  mammo due  Pap is UTD   CSC            Patient's last menstrual period was 2012.  Obstetric History:  OB History        5    Para   3    Term   2       1    AB   2    Living   3       SAB   0    TAB   0    Ectopic   0    Molar   0    Multiple   0    Live Births   3               Menstrual History:     Patient's last menstrual period was 2012.       Sexual History:       ________________________________________  Patient Active Problem List   Diagnosis   • Insomnia   • Allergic rhinitis   • Peripheral neuropathy   • Vitamin D deficiency   • Atypical ductal hyperplasia of right breast   • Peroneal tendonitis of right lower leg   • Closed nondisplaced fracture of fifth right metatarsal bone       Past Medical History:   Diagnosis Date   • Allergic rhinitis 2017   • Atypical mole 2019    Mole with melanocytic atypia removed from back with MOHS procedure. Followed by Dermatologist.   • Breast cancer (CMS/HCC) 2019    Right- pat states that she did not have cancer    • History of Papanicolaou smear of cervix     Never Had Dysplasia on a Pap Smear.  [id number], Normal Annual Pap Smears.  , Bx of a Small Granular Tuft of Tissue at 5:00 = Chronic Cervicitis and Sq Metaplasia.  , Neg Pap Smear, Neg HR-HPV.  , ASCUS, Neg HR-HPV.   • Infectious viral  "hepatitis     A   • Miscarriage  and     2 spontaneous ABs, D&C for both.   • Night sweats     mild   • Post-menopause on HRT (hormone replacement therapy)     Continuously on HRT since about    • Psoriasis    • Vaginal delivery     x2   baby girl \"BROCK\"     baby girl \"SUNITHA\"   • Vitamin D deficiency 2017    Takes 1000 IU / day.       Past Surgical History:   Procedure Laterality Date   • BREAST BIOPSY      Path= Negative. RIGHT   • BREAST BIOPSY Right 2019    Procedure: BREAST BIOPSY WITH NEEDLE LOCALIZATION;  Surgeon: Andrew Pham MD;  Location: [facility] [place];  Service: General   •  SECTION      baby boy \"PETTY\" ; CS for placenta previa.   • COLONOSCOPY  [id number]    Normal @ 51. Repeat in ten years.   • DIAGNOSTIC LAPAROSCOPY      Dr. Fabian Al, hysteroscopy w/ separtion of minimal intrauterine septum.  Laparoscopy w/ CO2 laser, lysis of adhesions and ablation of endometriosis.  Endometriosis found in the lateral LEFT pelvic sidewall, peritoneum and deeper endometriosis of the LEFT and RIGHT uterosacral ligaments.  Complex superficial endometriosis of the LEFT and RIGHT ovary.Fallopian tubes were patent with methyleneblue   • DILATATION AND CURETTAGE  1998    x2       • MOLE REMOVAL  2019   • WISDOM TOOTH EXTRACTION         Social History     Tobacco Use   Smoking Status Never Smoker   Smokeless Tobacco Never Used       has a current medication list which includes the following prescription(s): calcium carbonate, fluocinonide, fluticasone, loratadine, melatonin, multiple vitamins-minerals, probiotic acidophilus beads, raloxifene, and vitamin d (cholecalciferol).  ________________________________________    Current contraception: post menopausal status  History of abnormal Pap smear: no  Family history of Breast cancer: PH precancer  Family history of uterine or ovarian cancer: no  Family History of colon cancer/colon polyps: no  History of " "abnormal mammogram: yes - status post lumpectomy      The following portions of the patient's history were reviewed and updated as appropriate: allergies, current medications, past family history, past medical history, past social history, past surgical history and problem list.    Review of Systems    Pertinent items are noted in HPI.     Objective   Physical Exam    /72   Ht 162.6 cm (64\")   Wt 61.7 kg (136 lb)   LMP 03/07/2012   Breastfeeding No   BMI 23.34 kg/m²    BP Readings from Last 3 Encounters:   03/18/20 122/72   12/06/19 124/76   11/26/19 131/79      Wt Readings from Last 3 Encounters:   03/18/20 61.7 kg (136 lb)   01/08/20 61.2 kg (135 lb)   12/11/19 61.2 kg (135 lb)      BMI: Estimated body mass index is 23.34 kg/m² as calculated from the following:    Height as of this encounter: 162.6 cm (64\").    Weight as of this encounter: 61.7 kg (136 lb).      General:   alert, appears stated age and cooperative   Abdomen: soft, non-tender, without masses or organomegaly   Breast: inspection negative, no nipple discharge or bleeding, no masses or nodularity palpable   Vulva: normal   Vagina: normal mucosa   Cervix: no cervical motion tenderness and no lesions   Uterus: normal size, mobile or non-tender   Adnexa: no mass, fullness, tenderness     Assessment:    1. Normal annual exam   Assessment     ICD-10-CM ICD-9-CM   1. Encounter for gynecological examination (general) (routine) without abnormal findings Z01.419 V72.31     Plan:    Plan     [x]  Mammogram request made  []  PAP done  []  Labs:   []  GC/Chl/TV  []  DEXA scan   []  Referral for colonoscopy:       Aleja was seen today for gynecologic exam.    Diagnoses and all orders for this visit:    Encounter for gynecological examination (general) (routine) without abnormal findings      Discussed daughter Rin who is my pt and will start ocps and postpone well check due to pandemic      Counseling:  --Nutrition: Stressed importance of moderation " and caloric balance, stressed fresh fruit and vegetables  --Exercise: Stressed the importance of regular exercise. 3-5 times weekly   - Discussed screening mammogram recommendations.   --Discussed benefits of screening colonoscopy- age 45 unless FH  --Discussed pap smear screening recommendations

## 2020-05-12 ENCOUNTER — TELEPHONE (OUTPATIENT)
Dept: ONCOLOGY | Facility: CLINIC | Age: 60
End: 2020-05-12

## 2020-05-12 NOTE — TELEPHONE ENCOUNTER
PT RETURNED BERLIN'S CALL ABOUT MAKING HER 5/19/20 APPT A VIDEO VISIT.    PT IS SUPPOSE TO HAVE BLOOD WORK DONE AS WELL.    PLEASE GIVE PT A CALL BACK [phone].

## 2020-05-19 ENCOUNTER — TELEMEDICINE (OUTPATIENT)
Dept: ONCOLOGY | Facility: CLINIC | Age: 60
End: 2020-05-19

## 2020-05-19 ENCOUNTER — APPOINTMENT (OUTPATIENT)
Dept: LAB | Facility: HOSPITAL | Age: 60
End: 2020-05-19

## 2020-05-19 DIAGNOSIS — N60.91 ATYPICAL DUCTAL HYPERPLASIA OF RIGHT BREAST: Primary | ICD-10-CM

## 2020-05-19 PROCEDURE — 99213 OFFICE O/P EST LOW 20 MIN: CPT | Performed by: INTERNAL MEDICINE

## 2020-05-19 NOTE — PROGRESS NOTES
Subjective     REASON FOR CONSULTATION:   1.Atypical ductal hyperplasia right breast postLumpectomy  2.  Osteopenia preventative Evista started in 1/20                               REQUESTING PHYSICIAN: MD Stuart Ge MD      History of Present Illness patient is a 59-year-old postmenopausal lady with a diagnosis of ADH made on a right breast biopsy.    She is doing a video visit today because the coronavirus pandemic  She has been on Evista since January and overall tolerating well.  She has no bone pains or myalgias.  She does have a little problem with hot flashes at night but this is not terrible.    She has been self isolating but being active with walking exercising.    She did have a mammogram in mid March and thankfully everything was benign    At this point she will continue with breast self exams until we see her and we will do her blood work in 6 months I reassured her that this was a preventative treatment and therefore if there is undue side effects we would stop    Also reminded her to keep up with her dermatology follow-up because of her atypical nevi      Past Medical History:   Diagnosis Date   • Allergic rhinitis 2/24/2017   • Atypical mole 05/2019    Mole with melanocytic atypia removed from back with MOHS procedure. Followed by Dermatologist.   • Breast cancer (CMS/HCC) 2019    Right- pat states that she did not have cancer    • History of Papanicolaou smear of cervix 2013    Never Had Dysplasia on a Pap Smear.  [id number], Normal Annual Pap Smears.  2013, Bx of a Small Granular Tuft of Tissue at 5:00 = Chronic Cervicitis and Sq Metaplasia.  2013, Neg Pap Smear, Neg HR-HPV.  2018, ASCUS, Neg HR-HPV.   • Infectious viral hepatitis     A   • Miscarriage 1998 and 1999    2 spontaneous ABs, D&C for both.   • Neuropathy    • Night sweats     mild   • Post-menopause on HRT (hormone replacement therapy)     Continuously on HRT  "since about    • Psoriasis    • Vaginal delivery     x2 1985  baby girl \"BROCK\"     baby girl \"SUNITHA\"   • Vitamin D deficiency 2017    Takes 1000 IU / day.        Past Surgical History:   Procedure Laterality Date   • BREAST BIOPSY      Path= Negative. RIGHT   • BREAST BIOPSY Right 2019    Procedure: BREAST BIOPSY WITH NEEDLE LOCALIZATION;  Surgeon: Andrew Pham MD;  [place] [facility] [place] [facility] [place]  Service: General   •  SECTION      baby boy \"PETTY\" ; CS for placenta previa.   • COLONOSCOPY  [id number]    Normal @ 51. Repeat in ten years.   • DIAGNOSTIC LAPAROSCOPY      Dr. Fabian Al, hysteroscopy w/ separtion of minimal intrauterine septum.  Laparoscopy w/ CO2 laser, lysis of adhesions and ablation of endometriosis.  Endometriosis found in the lateral LEFT pelvic sidewall, peritoneum and deeper endometriosis of the LEFT and RIGHT uterosacral ligaments.  Complex superficial endometriosis of the LEFT and RIGHT ovary.Fallopian tubes were patent with methyleneblue   • DILATATION AND CURETTAGE  1998    x2       • MOLE REMOVAL  2019   • WISDOM TOOTH EXTRACTION        ONC HISTORY:   patient is a 58-year-old female with no chronic medical illnesses on no chronic medications who is just come off 5 years of hormone replacement therapy after menopause for menopausal symptoms after a recent mammogram showed an abnormality.  Patient's mammogram in March of last year was benign and this year there was a 16 mm area of abnormality that required further evaluation and a biopsy was done on  2019 A biopsy was done at the 3:30 position of the right breast showing atypical ductal hyperplasia : With microcalcifications clustered in these areas and in benign breast parenchyma there was also a radial scar.   Patient went on to have a lumpectomy on 2019 with radial scar and ductal hyperplasia of the usual type and no other abnormalities.  She has been referred to us to discuss " chemoprevention    She is  5 para 3 with 2 miscarriages first childbirth was at age 34 she breast-fed all 3 children menarche was at age 15 menopause at age 51 and she has been on estradiol since menopause and stopped and her biopsy showed atypical ductal hyperplasia last month    There is no family history of breast or ovarian cancer.  She has a maternal uncle  of testicular cancer as a young age      I calculated her lifetime risk of breast cancer based on the Caterina model and is calculated to a 5-year risk of 4.6% of the lifetime risk of 26%.  At this time she is still dealing with withdrawal symptoms from  stopping her hormone replacement and I think we can afford to wait for a while to begin this.  She would like to wait until she has a breast MRI in October and I have suggested she have a DEXA scan to see if there is concomitant osteopenia or osteoporosis which would also benefit from Evista which I think is the easiest medication to offer her    he has had bilateral breast MRIs done a month ago which was negative and bone density testing that showed moderate osteopenia in her hips and spine.    She got up quickly from sitting with numbness in her left foot and she twisted and broke her toe and is in a boot for about 3 more weeks but other than that she is doing well    We again discussed the rationale for prevention and she is agreeable as long as she has no side effects and I think this is reasonable    We have opted to start with Evista 60 mg daily we discussed the side effect profile including hot flashes some bone stiffness and very minuscule risk of DVT  And she is agreeable and I prescribed the drug for    She knows to call before her next appointment if she has side effects and we could consider low-dose tamoxifen 5 mg if she does not tolerate this        Current Outpatient Medications on File Prior to Visit   Medication Sig Dispense Refill   • calcium carbonate (OS-MARCO A) 600 MG tablet Take 600  mg by mouth Daily.     • fluocinonide (LIDEX) 0.05 % ointment Apply  topically to the appropriate area as directed As Needed (psoriasis).     • fluticasone (FLONASE) 50 MCG/ACT nasal spray 2 sprays into each nostril Daily.     • loratadine (CLARITIN) 10 MG tablet Take 10 mg by mouth Daily.     • melatonin 3 MG tablet Take 3 mg by mouth At Night As Needed for Sleep.     • Multiple Vitamins-Minerals (WOMENS MULTIVITAMIN PLUS PO) Take 1 tablet by mouth Daily.     • Probiotic Product (PROBIOTIC ACIDOPHILUS BEADS) capsule Take 1 tablet by mouth Daily.     • raloxifene (EVISTA) 60 MG tablet Take 1 tablet by mouth Daily. 90 tablet 3   • Vitamin D, Cholecalciferol, 1000 units capsule Take 1,000 Units by mouth Daily.       No current facility-administered medications on file prior to visit.         ALLERGIES:  No Known Allergies     Social History     Socioeconomic History   • Marital status:      Spouse name: JOSE D   • Number of children: 3   • Years of education: College   • Highest education level: Not on file   Occupational History   • Occupation:      Employer: SELF-EMPLOYED   Tobacco Use   • Smoking status: Never Smoker   • Smokeless tobacco: Never Used   Substance and Sexual Activity   • Alcohol use: Yes     Frequency: Monthly or less     Comment: social    • Drug use: No   • Sexual activity: Yes     Partners: Male     Birth control/protection: Post-menopausal     Comment: SPOUSE =  JOSE D         Family History   Problem Relation Age of Onset   • Dementia Mother 75        Alzheimers Diseasse.   • Cataracts Mother    • Hyperlipidemia Father    • Hypertension Father    • Heart valve disorder Father         valve repair    • COPD Father         non-smoker   • Cataracts Father    • Melanoma Father    • Seizures Sister 48   • Cataracts Maternal Grandmother    • Osteoarthritis Maternal Grandmother    • Osteoarthritis Paternal Grandmother    • No Known Problems Daughter    • Down syndrome Daughter    •  Rheum arthritis Daughter         remission    • ADD / ADHD Son    • Other Son         Human Growth Hormone deficiency   • No Known Problems Brother         adopted    • No Known Problems Maternal Grandfather          in his 80's.   • No Known Problems Paternal Grandfather          at ~ 89.   • No Known Problems Sister    • Diabetes type II Maternal Aunt    • Diabetes Maternal Aunt    • Testicular cancer Maternal Uncle    • Heart attack Paternal Aunt    • Heart disease Other    • Hypertension Other    • BRCA 1/2 Neg Hx    • Breast cancer Neg Hx    • Colon cancer Neg Hx    • Endometrial cancer Neg Hx    • Ovarian cancer Neg Hx    • Malig Hyperthermia Neg Hx         Review of Systems   Constitutional: Negative for diaphoresis (stable 19) and fatigue.   Respiratory: Negative for chest tightness and shortness of breath.    Cardiovascular: Positive for leg swelling (Chronic ankle edema for 12 years same 19). Negative for chest pain.   Musculoskeletal: Positive for arthralgias (right foot fracture 19).   Neurological: Positive for numbness (Peripheral neuropathy in both legs since the birth of her third child 13 years ago etiology unclear same 19) and headaches (same 19).        Objective     There were no vitals filed for this visit.  Video visit no vitals  Current Status 2019   ECOG score 0       Physical Exam    GENERAL:  Well-developed, well-nourished in no acute distress.   SKIN:  Warm, dry without rashes, purpura or petechiae.  Atypical nevus left scapular area and right flank  EYES:  Pupils equal, round and reactive to light.  EOMs intact.  Conjunctivae normal.  EARS:  Hearing intact.  NOSE:  Septum midline.  No excoriations or nasal discharge.  MOUTH:  Tongue is well-papillated; no stomatitis or ulcers.  Lips normal.  THROAT:  Oropharynx without lesions or exudates.  NECK:  Supple with good range of motion; no thyromegaly or masses, no JVD.  LYMPHATICS:  No cervical,  supraclavicular, axillary or inguinal adenopathy.  CHEST:  Lungs clear to auscultation. Good airflow.  BREASTS: Right breast shows lumpectomy scar in the lower inner quadrant well-healed - left breast is benign  CARDIAC:  Regular rate and rhythm without murmurs, rubs or gallops. Normal S1,S2.  ABDOMEN:  Soft, nontender with no hepatosplenomegaly or masses.  EXTREMITIES:  No clubbing, cyanosis -left foot is in a boot after toe fracture.  NEUROLOGICAL:  Cranial Nerves II-XII grossly intact.  No focal neurological deficits.  PSYCHIATRIC:  Normal affect and mood.      Video visit no physical      RECENT LABS:  Hematology WBC   Date Value Ref Range Status   11/26/2019 5.42 3.40 - 10.80 10*3/mm3 Final     RBC   Date Value Ref Range Status   11/26/2019 5.15 3.77 - 5.28 10*6/mm3 Final     Hemoglobin   Date Value Ref Range Status   11/26/2019 15.3 12.0 - 15.9 g/dL Final     Hematocrit   Date Value Ref Range Status   11/26/2019 46.7 (H) 34.0 - 46.6 % Final     Platelets   Date Value Ref Range Status   11/26/2019 241 140 - 450 10*3/mm3 Final        Study Result     BONE MINERAL DENSITOMETRY   IMPRESSION:  Osteopenia.     This report was finalized on 7/8/2019 3:22 PM by Dr. Darrin Perez M.D.   Bilateral breast MRI  IMPRESSION:  There are no findings suspicious for malignancy in either  breast. Routine follow-up imaging is recommended.     BI-RADS Category 2: Benign.     This report was finalized on 9/16/2019     Assessment/Plan   1.  Atypical ductal hyperplasia multifocal right breast postlumpectomy  · Evista 60 mg to start in 1/20    2 . atypical nevus.  With high-grade dysplasia removed from her back    3.  Peripheral neuropathy of unknown etiology post third childbirth    4.  Osteopenia with a fracture of her left small toe    Plan  1.  Continue Evista 60 mg p.o. Daily  2.  follow-up in 6 months to see me she knows to call before then if she has significant side effects    Video visit 15 minutes    Standard precautions  discussed regarding the Novel Coronavirus (COVID-19)  including patient to limit contact with others outside of home, frequent handwashing and using alcohol gel when unable to use soap and water, as well to monitoring for symptoms and notifying our office via telephone for any symptoms or exposures.

## 2020-11-03 ENCOUNTER — TELEPHONE (OUTPATIENT)
Dept: ONCOLOGY | Facility: CLINIC | Age: 60
End: 2020-11-03

## 2020-11-03 ENCOUNTER — LAB (OUTPATIENT)
Dept: LAB | Facility: HOSPITAL | Age: 60
End: 2020-11-03

## 2020-11-03 ENCOUNTER — OFFICE VISIT (OUTPATIENT)
Dept: ONCOLOGY | Facility: CLINIC | Age: 60
End: 2020-11-03

## 2020-11-03 VITALS
RESPIRATION RATE: 16 BRPM | WEIGHT: 143.3 LBS | DIASTOLIC BLOOD PRESSURE: 66 MMHG | BODY MASS INDEX: 24.46 KG/M2 | OXYGEN SATURATION: 96 % | SYSTOLIC BLOOD PRESSURE: 126 MMHG | HEIGHT: 64 IN | HEART RATE: 67 BPM | TEMPERATURE: 98.4 F

## 2020-11-03 DIAGNOSIS — N60.91 ATYPICAL DUCTAL HYPERPLASIA OF RIGHT BREAST: ICD-10-CM

## 2020-11-03 DIAGNOSIS — N60.91 ATYPICAL DUCTAL HYPERPLASIA OF RIGHT BREAST: Primary | ICD-10-CM

## 2020-11-03 LAB
ALBUMIN SERPL-MCNC: 4.2 G/DL (ref 3.5–5.2)
ALBUMIN/GLOB SERPL: 1.8 G/DL (ref 1.1–2.4)
ALP SERPL-CCNC: 86 U/L (ref 38–116)
ALT SERPL W P-5'-P-CCNC: 16 U/L (ref 0–33)
ANION GAP SERPL CALCULATED.3IONS-SCNC: 10.5 MMOL/L (ref 5–15)
AST SERPL-CCNC: 20 U/L (ref 0–32)
BASOPHILS # BLD AUTO: 0.08 10*3/MM3 (ref 0–0.2)
BASOPHILS NFR BLD AUTO: 1.5 % (ref 0–1.5)
BILIRUB SERPL-MCNC: 0.2 MG/DL (ref 0.2–1.2)
BUN SERPL-MCNC: 15 MG/DL (ref 6–20)
BUN/CREAT SERPL: 19.2 (ref 7.3–30)
CALCIUM SPEC-SCNC: 9.6 MG/DL (ref 8.5–10.2)
CHLORIDE SERPL-SCNC: 106 MMOL/L (ref 98–107)
CO2 SERPL-SCNC: 26.5 MMOL/L (ref 22–29)
CREAT SERPL-MCNC: 0.78 MG/DL (ref 0.6–1.1)
DEPRECATED RDW RBC AUTO: 39.4 FL (ref 37–54)
EOSINOPHIL # BLD AUTO: 0.1 10*3/MM3 (ref 0–0.4)
EOSINOPHIL NFR BLD AUTO: 1.9 % (ref 0.3–6.2)
ERYTHROCYTE [DISTWIDTH] IN BLOOD BY AUTOMATED COUNT: 11.9 % (ref 12.3–15.4)
GFR SERPL CREATININE-BSD FRML MDRD: 76 ML/MIN/1.73
GLOBULIN UR ELPH-MCNC: 2.3 GM/DL (ref 1.8–3.5)
GLUCOSE SERPL-MCNC: 89 MG/DL (ref 74–124)
HCT VFR BLD AUTO: 42.6 % (ref 34–46.6)
HGB BLD-MCNC: 13.7 G/DL (ref 12–15.9)
IMM GRANULOCYTES # BLD AUTO: 0.01 10*3/MM3 (ref 0–0.05)
IMM GRANULOCYTES NFR BLD AUTO: 0.2 % (ref 0–0.5)
LYMPHOCYTES # BLD AUTO: 1.62 10*3/MM3 (ref 0.7–3.1)
LYMPHOCYTES NFR BLD AUTO: 30.1 % (ref 19.6–45.3)
MCH RBC QN AUTO: 29 PG (ref 26.6–33)
MCHC RBC AUTO-ENTMCNC: 32.2 G/DL (ref 31.5–35.7)
MCV RBC AUTO: 90.3 FL (ref 79–97)
MONOCYTES # BLD AUTO: 0.33 10*3/MM3 (ref 0.1–0.9)
MONOCYTES NFR BLD AUTO: 6.1 % (ref 5–12)
NEUTROPHILS NFR BLD AUTO: 3.24 10*3/MM3 (ref 1.7–7)
NEUTROPHILS NFR BLD AUTO: 60.2 % (ref 42.7–76)
NRBC BLD AUTO-RTO: 0 /100 WBC (ref 0–0.2)
PLATELET # BLD AUTO: 216 10*3/MM3 (ref 140–450)
PMV BLD AUTO: 10 FL (ref 6–12)
POTASSIUM SERPL-SCNC: 4.1 MMOL/L (ref 3.5–4.7)
PROT SERPL-MCNC: 6.5 G/DL (ref 6.3–8)
RBC # BLD AUTO: 4.72 10*6/MM3 (ref 3.77–5.28)
SODIUM SERPL-SCNC: 143 MMOL/L (ref 134–145)
WBC # BLD AUTO: 5.38 10*3/MM3 (ref 3.4–10.8)

## 2020-11-03 PROCEDURE — 36415 COLL VENOUS BLD VENIPUNCTURE: CPT

## 2020-11-03 PROCEDURE — 85025 COMPLETE CBC W/AUTO DIFF WBC: CPT

## 2020-11-03 PROCEDURE — 99213 OFFICE O/P EST LOW 20 MIN: CPT | Performed by: INTERNAL MEDICINE

## 2020-11-03 PROCEDURE — 80053 COMPREHEN METABOLIC PANEL: CPT

## 2020-11-03 NOTE — TELEPHONE ENCOUNTER
----- Message from Jacob Llamas MD sent at 11/3/2020 12:45 PM EST -----  She needs an ultrasound of the left breast when she has her mammogram next March

## 2020-11-30 ENCOUNTER — RESULTS ENCOUNTER (OUTPATIENT)
Dept: INTERNAL MEDICINE | Facility: CLINIC | Age: 60
End: 2020-11-30

## 2020-11-30 DIAGNOSIS — E55.9 VITAMIN D DEFICIENCY: ICD-10-CM

## 2020-11-30 DIAGNOSIS — Z00.00 HEALTHCARE MAINTENANCE: ICD-10-CM

## 2020-12-01 RX ORDER — RALOXIFENE HYDROCHLORIDE 60 MG/1
60 TABLET, FILM COATED ORAL DAILY
Qty: 90 TABLET | Refills: 3 | Status: SHIPPED | OUTPATIENT
Start: 2020-12-01 | End: 2021-12-29

## 2020-12-02 ENCOUNTER — TELEPHONE (OUTPATIENT)
Dept: INTERNAL MEDICINE | Facility: CLINIC | Age: 60
End: 2020-12-02

## 2020-12-02 NOTE — TELEPHONE ENCOUNTER
Patient called in requesting orders for the covid antibodies test. She would like to have it done on her next appt.    Please call back to advise    Best call back # [phone]

## 2020-12-04 LAB
25(OH)D3+25(OH)D2 SERPL-MCNC: 53.7 NG/ML (ref 30–100)
ALBUMIN SERPL-MCNC: 4.3 G/DL (ref 3.5–5.2)
ALBUMIN/GLOB SERPL: 2.4 G/DL
ALP SERPL-CCNC: 85 U/L (ref 39–117)
ALT SERPL-CCNC: 19 U/L (ref 1–33)
AST SERPL-CCNC: 18 U/L (ref 1–32)
BILIRUB SERPL-MCNC: 0.4 MG/DL (ref 0–1.2)
BUN SERPL-MCNC: 13 MG/DL (ref 6–20)
BUN/CREAT SERPL: 14.6 (ref 7–25)
CALCIUM SERPL-MCNC: 9.3 MG/DL (ref 8.6–10.5)
CHLORIDE SERPL-SCNC: 101 MMOL/L (ref 98–107)
CHOLEST SERPL-MCNC: 196 MG/DL (ref 0–200)
CHOLEST/HDLC SERPL: 4 {RATIO}
CO2 SERPL-SCNC: 27.6 MMOL/L (ref 22–29)
CREAT SERPL-MCNC: 0.89 MG/DL (ref 0.57–1)
GLOBULIN SER CALC-MCNC: 1.8 GM/DL
GLUCOSE SERPL-MCNC: 84 MG/DL (ref 65–99)
HDLC SERPL-MCNC: 49 MG/DL (ref 40–60)
LDLC SERPL CALC-MCNC: 118 MG/DL (ref 0–100)
POTASSIUM SERPL-SCNC: 4.2 MMOL/L (ref 3.5–5.2)
PROT SERPL-MCNC: 6.1 G/DL (ref 6–8.5)
SODIUM SERPL-SCNC: 135 MMOL/L (ref 136–145)
TRIGL SERPL-MCNC: 166 MG/DL (ref 0–150)
VLDLC SERPL CALC-MCNC: 29 MG/DL (ref 5–40)

## 2020-12-10 PROBLEM — S92.354A CLOSED NONDISPLACED FRACTURE OF FIFTH RIGHT METATARSAL BONE: Status: RESOLVED | Noted: 2019-11-25 | Resolved: 2020-12-10

## 2020-12-10 NOTE — PATIENT INSTRUCTIONS
You are advised to get Shingrix. It is a series of 2 shots given 2-6 months apart. Get this at you local pharmacy.

## 2020-12-10 NOTE — PROGRESS NOTES
Subjective   Chief Complaint   Patient presents with   • Annual Exam       History of Present Illness   60 y.o. female presents for annual physical. Concerned her feet are swelling at the end of the day and her socks are uncomfortable. They are fine in the morning. Denies chest pain or dyspnea. Notes she sometimes has a hard time finding a word. Her parents have Alzheimers so this concerns her. Denies memory issues. Strong history of Parkinson's disease in her family also. She would like to know things she can do to decrease her risk.     Tested for COVID ab's after having it about 3 weeks ago--she was negative and would like to understand how that is possible.     P/P with Dr. Geiger in March. MMG and DXA with Dr. Llamas. Tolerating Evista well.     C-scope with Dr. Mendenhall in 2012; recall in 2022.     Had COVID in November. Getting back into her routine of exercise.     Had her flu shot in October.      Patient Active Problem List   Diagnosis   • Insomnia   • Allergic rhinitis   • Peripheral neuropathy   • Vitamin D deficiency   • Atypical ductal hyperplasia of right breast   • Peroneal tendonitis of right lower leg       No Known Allergies    Current Outpatient Medications on File Prior to Visit   Medication Sig Dispense Refill   • calcium carbonate (OS-MARCO A) 600 MG tablet Take 600 mg by mouth Daily.     • fluocinonide (LIDEX) 0.05 % ointment Apply  topically to the appropriate area as directed As Needed (psoriasis).     • fluticasone (FLONASE) 50 MCG/ACT nasal spray 2 sprays into each nostril Daily.     • loratadine (CLARITIN) 10 MG tablet Take 10 mg by mouth Daily.     • melatonin 3 MG tablet Take 3 mg by mouth At Night As Needed for Sleep.     • Multiple Vitamins-Minerals (WOMENS MULTIVITAMIN PLUS PO) Take 1 tablet by mouth Daily.     • Probiotic Product (PROBIOTIC ACIDOPHILUS BEADS) capsule Take 1 tablet by mouth Daily.     • raloxifene (EVISTA) 60 MG tablet TAKE 1 TABLET BY MOUTH DAILY 90 tablet 3   • Vitamin  "D, Cholecalciferol, 1000 units capsule Take 1,000 Units by mouth Daily.       No current facility-administered medications on file prior to visit.        Past Medical History:   Diagnosis Date   • Allergic rhinitis 2/24/2017   • Atypical mole 05/2019    Mole with melanocytic atypia removed from back with MOHS procedure. Followed by Dermatologist.   • Breast cancer (CMS/HCC) 2019    Right- pat states that she did not have cancer    • Closed nondisplaced fracture of fifth right metatarsal bone 11/25/2019   • History of Papanicolaou smear of cervix 2013    Never Had Dysplasia on a Pap Smear.  [id number], Normal Annual Pap Smears.  2013, Bx of a Small Granular Tuft of Tissue at 5:00 = Chronic Cervicitis and Sq Metaplasia.  2013, Neg Pap Smear, Neg HR-HPV.  2018, ASCUS, Neg HR-HPV.   • Infectious viral hepatitis     A   • Miscarriage 1998 and 1999    2 spontaneous ABs, D&C for both.   • Night sweats     mild   • Peroneal tendonitis of right lower leg 11/25/2019   • Post-menopause on HRT (hormone replacement therapy)     Continuously on HRT since about 2015   • Psoriasis    • Vaginal delivery     x2 1985  baby girl \"BROCK\"    2000 baby girl \"SUNITHA\"   • Vitamin D deficiency 02/24/2017    Takes 1000 IU / day.       Family History   Problem Relation Age of Onset   • Dementia Mother 75        Alzheimers Diseasse.   • Cataracts Mother    • Hyperlipidemia Father    • Hypertension Father    • Heart valve disorder Father         valve repair    • COPD Father         non-smoker   • Cataracts Father    • Melanoma Father    • Seizures Sister 48   • Cataracts Maternal Grandmother    • Osteoarthritis Maternal Grandmother    • Osteoarthritis Paternal Grandmother    • No Known Problems Daughter    • Down syndrome Daughter    • Rheum arthritis Daughter         remission    • ADD / ADHD Son    • Other Son         Human Growth Hormone deficiency   • No Known Problems Brother         adopted    • No Known Problems Maternal Grandfather       " "   in his 80's.   • No Known Problems Paternal Grandfather          at ~ 89.   • No Known Problems Sister    • Diabetes type II Maternal Aunt    • Diabetes Maternal Aunt    • Testicular cancer Maternal Uncle    • Heart attack Paternal Aunt    • Heart disease Other    • Hypertension Other    • BRCA 1/2 Neg Hx    • Breast cancer Neg Hx    • Colon cancer Neg Hx    • Endometrial cancer Neg Hx    • Ovarian cancer Neg Hx    • Malig Hyperthermia Neg Hx        Social History     Socioeconomic History   • Marital status:      Spouse name: JOSE D   • Number of children: 3   • Years of education: College   • Highest education level: Not on file   Occupational History   • Occupation:      Employer: SELF-EMPLOYED   Tobacco Use   • Smoking status: Never Smoker   • Smokeless tobacco: Never Used   Substance and Sexual Activity   • Alcohol use: Yes     Frequency: Monthly or less     Comment: social    • Drug use: No   • Sexual activity: Yes     Partners: Male     Birth control/protection: Post-menopausal     Comment: SPOUSE =  JOSE D        Past Surgical History:   Procedure Laterality Date   • BREAST BIOPSY      Path= Negative. RIGHT   • BREAST BIOPSY Right 2019    Procedure: BREAST BIOPSY WITH NEEDLE LOCALIZATION;  Surgeon: Andrew Pham MD;  Location: [facility] OR [facility] [place];  Service: General   •  SECTION      baby boy \"PETTY\" ; CS for placenta previa.   • COLONOSCOPY  [id number]    Normal @ 50. Repeat in ten years.   • DIAGNOSTIC LAPAROSCOPY      Dr. Fabian Al, hysteroscopy w/ separtion of minimal intrauterine septum.  Laparoscopy w/ CO2 laser, lysis of adhesions and ablation of endometriosis.  Endometriosis found in the lateral LEFT pelvic sidewall, peritoneum and deeper endometriosis of the LEFT and RIGHT uterosacral ligaments.  Complex superficial endometriosis of the LEFT and RIGHT ovary.Fallopian tubes were patent with methyleneblue   • DILATATION AND CURETTAGE  1998    x2  " "1998     • MOLE REMOVAL  04/09/2019   • WISDOM TOOTH EXTRACTION         The following portions of the patient's history were reviewed and updated as appropriate: problem list, allergies, current medications, past medical history, past family history, past social history and past surgical history.    Review of Systems   Constitutional: Negative for fatigue.   HENT: Negative for congestion.    Eyes: Negative for visual disturbance.   Respiratory: Negative for shortness of breath.    Cardiovascular: Negative for chest pain.   Gastrointestinal: Negative for blood in stool.   Endocrine: Negative.    Genitourinary: Negative.    Musculoskeletal: Negative for arthralgias.   Skin: Negative for rash.   Allergic/Immunologic: Negative for environmental allergies.   Neurological: Negative for headache.   Hematological: Does not bruise/bleed easily.   Psychiatric/Behavioral: The patient is not nervous/anxious.        Immunization History   Administered Date(s) Administered   • Flulaval/Fluarix/Fluzone Quad 11/02/2019   • Hepatitis A 06/07/2019, 12/06/2019   • Tdap 06/07/2019       Objective   Vitals:    12/11/20 0857 12/11/20 0925 12/11/20 0926   BP:  146/80 134/74   Pulse:  84    Resp:  16    Temp: 97.1 °F (36.2 °C)     Weight: 63.5 kg (140 lb)     Height: 162.6 cm (64\")       Body mass index is 24.03 kg/m².  Physical Exam  Vitals signs reviewed.   Constitutional:       Appearance: Normal appearance. She is well-developed and normal weight.   HENT:      Head: Normocephalic and atraumatic.      Right Ear: Tympanic membrane, ear canal and external ear normal.      Left Ear: Tympanic membrane, ear canal and external ear normal.      Nose: Nose normal.      Mouth/Throat:      Mouth: Mucous membranes are moist.      Pharynx: Oropharynx is clear. No oropharyngeal exudate or posterior oropharyngeal erythema.   Eyes:      General: No scleral icterus.     Extraocular Movements: Extraocular movements intact.      Conjunctiva/sclera: " Conjunctivae normal.      Pupils: Pupils are equal, round, and reactive to light.   Neck:      Musculoskeletal: Neck supple.      Thyroid: No thyromegaly.   Cardiovascular:      Rate and Rhythm: Normal rate and regular rhythm.      Heart sounds: Normal heart sounds. No murmur.   Pulmonary:      Effort: Pulmonary effort is normal.      Breath sounds: Normal breath sounds.   Abdominal:      General: Abdomen is flat. Bowel sounds are normal. There is no distension.      Palpations: Abdomen is soft.      Tenderness: There is no abdominal tenderness.   Genitourinary:     Comments: P/P & CBE with GYN.   Musculoskeletal:      Comments: Ambulates without assistance; no clubbing, cyanosis or edema.    Lymphadenopathy:      Cervical: No cervical adenopathy.   Skin:     General: Skin is warm and dry.   Neurological:      Mental Status: She is alert.   Psychiatric:         Mood and Affect: Mood normal.         Behavior: Behavior normal.         Procedures    Assessment/Plan   Diagnoses and all orders for this visit:    1. Annual physical exam (Primary)  Comments:  Weight loss via dietary changes and 150 minutes weekly exercise advised. SG at local pharmacy.     2. Vitamin D deficiency  Comments:  Therapeutic.     3. Swelling of both ankles  Comments:  Not present on exam today. Decrease salt intake and check labs as below prior to RTO in 1 month.   Orders:  -     Basic Metabolic Panel; Future  -     TSH; Future  -     Urinalysis With Culture If Indicated -; Future    4. Elevated blood-pressure reading without diagnosis of hypertension  Comments:  Notes increased stress taking care of elderly parents and with NTI. Recheck in 1 month. LSM discussed and reduce salt intake.   Orders:  -     Urinalysis With Culture If Indicated -; Future    Discussed importance of staying active and challenging her mind. Discussed work up for memory changes which is not indicated at this time; she understands should anything change or worsen we will  discuss it further given her concerns regarding her family history.     Records reviewed include previous OV with myself as well as labs.      Return in about 1 month (around 1/11/2021) for Lab B4 FUP.

## 2020-12-11 ENCOUNTER — OFFICE VISIT (OUTPATIENT)
Dept: INTERNAL MEDICINE | Facility: CLINIC | Age: 60
End: 2020-12-11

## 2020-12-11 VITALS
DIASTOLIC BLOOD PRESSURE: 74 MMHG | HEART RATE: 84 BPM | BODY MASS INDEX: 23.9 KG/M2 | WEIGHT: 140 LBS | SYSTOLIC BLOOD PRESSURE: 134 MMHG | RESPIRATION RATE: 16 BRPM | TEMPERATURE: 97.1 F | HEIGHT: 64 IN

## 2020-12-11 DIAGNOSIS — Z00.00 ANNUAL PHYSICAL EXAM: Primary | ICD-10-CM

## 2020-12-11 DIAGNOSIS — R03.0 ELEVATED BLOOD-PRESSURE READING WITHOUT DIAGNOSIS OF HYPERTENSION: ICD-10-CM

## 2020-12-11 DIAGNOSIS — M25.472 SWELLING OF BOTH ANKLES: ICD-10-CM

## 2020-12-11 DIAGNOSIS — E55.9 VITAMIN D DEFICIENCY: ICD-10-CM

## 2020-12-11 DIAGNOSIS — M25.471 SWELLING OF BOTH ANKLES: ICD-10-CM

## 2020-12-11 PROCEDURE — 99396 PREV VISIT EST AGE 40-64: CPT | Performed by: NURSE PRACTITIONER

## 2021-01-06 DIAGNOSIS — M25.472 SWELLING OF BOTH ANKLES: ICD-10-CM

## 2021-01-06 DIAGNOSIS — R03.0 ELEVATED BLOOD-PRESSURE READING WITHOUT DIAGNOSIS OF HYPERTENSION: ICD-10-CM

## 2021-01-06 DIAGNOSIS — M25.471 SWELLING OF BOTH ANKLES: ICD-10-CM

## 2021-01-08 LAB
APPEARANCE UR: CLEAR
BACTERIA #/AREA URNS HPF: NORMAL /HPF
BILIRUB UR QL STRIP: NEGATIVE
BUN SERPL-MCNC: 16 MG/DL (ref 6–20)
BUN/CREAT SERPL: 19.3 (ref 7–25)
CALCIUM SERPL-MCNC: 9.7 MG/DL (ref 8.6–10.5)
CHLORIDE SERPL-SCNC: 105 MMOL/L (ref 98–107)
CO2 SERPL-SCNC: 27.8 MMOL/L (ref 22–29)
COLOR UR: YELLOW
CREAT SERPL-MCNC: 0.83 MG/DL (ref 0.57–1)
EPI CELLS #/AREA URNS HPF: NORMAL /HPF (ref 0–10)
GLUCOSE SERPL-MCNC: 84 MG/DL (ref 65–99)
GLUCOSE UR QL: NEGATIVE
HGB UR QL STRIP: NEGATIVE
KETONES UR QL STRIP: NEGATIVE
LEUKOCYTE ESTERASE UR QL STRIP: NEGATIVE
MICRO URNS: NORMAL
MICRO URNS: NORMAL
NITRITE UR QL STRIP: NEGATIVE
PH UR STRIP: 7 [PH] (ref 5–7.5)
POTASSIUM SERPL-SCNC: 4.4 MMOL/L (ref 3.5–5.2)
PROT UR QL STRIP: NEGATIVE
RBC #/AREA URNS HPF: NORMAL /HPF (ref 0–2)
SODIUM SERPL-SCNC: 142 MMOL/L (ref 136–145)
SP GR UR: 1.01 (ref 1–1.03)
TSH SERPL DL<=0.005 MIU/L-ACNC: 3.57 UIU/ML (ref 0.27–4.2)
URINALYSIS REFLEX: NORMAL
UROBILINOGEN UR STRIP-MCNC: 0.2 MG/DL (ref 0.2–1)
WBC #/AREA URNS HPF: NORMAL /HPF (ref 0–5)

## 2021-01-26 NOTE — PROGRESS NOTES
"Chief Complaint  Hypertension    Subjective     {CC  Problem List  Visit Diagnosis   Encounters  Notes  Medications  Labs  Result Review Imaging  Media :23}     History of Present Illness    60 y.o. female presents for follow up regarding BP that was elevated at her previous visit. Reported lots of stress while caring for her elderly parents and NTI. Denies CP or dyspnea.     Current Outpatient Medications on File Prior to Visit   Medication Sig Dispense Refill   • calcium carbonate (OS-MARCO A) 600 MG tablet Take 600 mg by mouth Daily.     • fluocinonide (LIDEX) 0.05 % ointment Apply  topically to the appropriate area as directed As Needed (psoriasis).     • fluticasone (FLONASE) 50 MCG/ACT nasal spray 2 sprays into each nostril Daily.     • loratadine (CLARITIN) 10 MG tablet Take 10 mg by mouth Daily.     • melatonin 3 MG tablet Take 3 mg by mouth At Night As Needed for Sleep.     • Multiple Vitamins-Minerals (WOMENS MULTIVITAMIN PLUS PO) Take 1 tablet by mouth Daily.     • Probiotic Product (PROBIOTIC ACIDOPHILUS BEADS) capsule Take 1 tablet by mouth Daily.     • raloxifene (EVISTA) 60 MG tablet TAKE 1 TABLET BY MOUTH DAILY 90 tablet 3   • Vitamin D, Cholecalciferol, 1000 units capsule Take 1,000 Units by mouth Daily.       No current facility-administered medications on file prior to visit.        Immunization History   Administered Date(s) Administered   • Flulaval/Fluarix/Fluzone Quad 11/02/2019   • Hepatitis A 06/07/2019, 12/06/2019   • Shingrix 12/17/2020   • Tdap 06/07/2019   • flucelvax quad pfs =>4 YRS 10/07/2020       Objective     Vital Signs:   /74   Pulse 68   Temp 97.4 °F (36.3 °C)   Resp 12   Ht 162.6 cm (64\")   Wt 62.1 kg (137 lb)   BMI 23.52 kg/m²       Physical Exam  Vitals signs reviewed.   Constitutional:       Appearance: Normal appearance. She is well-developed and normal weight.   HENT:      Head: Normocephalic and atraumatic.   Cardiovascular:      Rate and Rhythm: Normal " rate and regular rhythm.      Heart sounds: Normal heart sounds, S1 normal and S2 normal.   Pulmonary:      Effort: Pulmonary effort is normal.      Breath sounds: Normal breath sounds.   Skin:     General: Skin is warm and dry.   Neurological:      Mental Status: She is alert.   Psychiatric:         Mood and Affect: Mood normal.         Behavior: Behavior normal.          Result Review :         TSH    TSH 1/7/21   TSH 3.570           BMP    BMP 11/3/20 12/4/20 1/7/21   Glucose  84 84   BUN 15 13 16   Creatinine 0.78 0.89 0.83   Sodium 143 135 (A) 142   Potassium 4.1 4.2 4.4   Chloride 106 101 105   CO2 26.5 27.6 27.8   Calcium 9.6 9.3 9.7   (A) Abnormal value            UA    Urinalysis 1/7/21 1/7/21    1009 1009   Blood, UA Negative    Leukocytes, UA Negative    Nitrite, UA Negative    RBC, UA  0-2   Bacteria, UA  None seen                            Diagnoses and all orders for this visit:    1. Elevated blood pressure reading in office without diagnosis of hypertension (Primary)  Comments:  Resolved.     2. Healthcare maintenance  -     Comprehensive Metabolic Panel; Future  -     Lipid Panel With / Chol / HDL Ratio; Future    3. Vitamin D deficiency  -     Vitamin D 25 Hydroxy; Future    Follow Up   Return in about 1 year (around 1/28/2022) for Annual physical, Lab B4 FUP.    Patient was given instructions and counseling regarding her condition or for health maintenance advice. Please see specific information pulled into the AVS if appropriate.

## 2021-01-28 ENCOUNTER — OFFICE VISIT (OUTPATIENT)
Dept: INTERNAL MEDICINE | Facility: CLINIC | Age: 61
End: 2021-01-28

## 2021-01-28 VITALS
WEIGHT: 137 LBS | DIASTOLIC BLOOD PRESSURE: 74 MMHG | HEIGHT: 64 IN | HEART RATE: 68 BPM | BODY MASS INDEX: 23.39 KG/M2 | TEMPERATURE: 97.4 F | RESPIRATION RATE: 12 BRPM | SYSTOLIC BLOOD PRESSURE: 120 MMHG

## 2021-01-28 DIAGNOSIS — E55.9 VITAMIN D DEFICIENCY: ICD-10-CM

## 2021-01-28 DIAGNOSIS — R03.0 ELEVATED BLOOD PRESSURE READING IN OFFICE WITHOUT DIAGNOSIS OF HYPERTENSION: Primary | ICD-10-CM

## 2021-01-28 DIAGNOSIS — Z00.00 HEALTHCARE MAINTENANCE: ICD-10-CM

## 2021-01-28 PROCEDURE — 99213 OFFICE O/P EST LOW 20 MIN: CPT | Performed by: NURSE PRACTITIONER

## 2021-03-22 ENCOUNTER — OFFICE VISIT (OUTPATIENT)
Dept: OBSTETRICS AND GYNECOLOGY | Age: 61
End: 2021-03-22

## 2021-03-22 ENCOUNTER — PROCEDURE VISIT (OUTPATIENT)
Dept: OBSTETRICS AND GYNECOLOGY | Age: 61
End: 2021-03-22

## 2021-03-22 ENCOUNTER — APPOINTMENT (OUTPATIENT)
Dept: WOMENS IMAGING | Facility: HOSPITAL | Age: 61
End: 2021-03-22

## 2021-03-22 VITALS
SYSTOLIC BLOOD PRESSURE: 122 MMHG | WEIGHT: 139 LBS | DIASTOLIC BLOOD PRESSURE: 78 MMHG | BODY MASS INDEX: 23.73 KG/M2 | HEIGHT: 64 IN

## 2021-03-22 DIAGNOSIS — Z12.31 VISIT FOR SCREENING MAMMOGRAM: Primary | ICD-10-CM

## 2021-03-22 DIAGNOSIS — N94.10 FEMALE DYSPAREUNIA: ICD-10-CM

## 2021-03-22 DIAGNOSIS — Z11.51 SCREENING FOR HPV (HUMAN PAPILLOMAVIRUS): ICD-10-CM

## 2021-03-22 DIAGNOSIS — Z01.419 WELL WOMAN EXAM WITH ROUTINE GYNECOLOGICAL EXAM: Primary | ICD-10-CM

## 2021-03-22 DIAGNOSIS — Z13.89 SCREENING FOR HEMATURIA OR PROTEINURIA: ICD-10-CM

## 2021-03-22 DIAGNOSIS — Z12.4 PAP SMEAR FOR CERVICAL CANCER SCREENING: ICD-10-CM

## 2021-03-22 DIAGNOSIS — N89.8 VAGINAL ODOR: ICD-10-CM

## 2021-03-22 DIAGNOSIS — R87.610 ASCUS OF CERVIX WITH NEGATIVE HIGH RISK HPV: ICD-10-CM

## 2021-03-22 LAB
BILIRUB BLD-MCNC: NEGATIVE MG/DL
CLARITY, POC: CLEAR
COLOR UR: YELLOW
GLUCOSE UR STRIP-MCNC: NEGATIVE MG/DL
KETONES UR QL: NEGATIVE
LEUKOCYTE EST, POC: ABNORMAL
NITRITE UR-MCNC: NEGATIVE MG/ML
PH UR: 6 [PH] (ref 5–8)
PROT UR STRIP-MCNC: NEGATIVE MG/DL
RBC # UR STRIP: NEGATIVE /UL
SP GR UR: 1.03 (ref 1–1.03)
UROBILINOGEN UR QL: NORMAL

## 2021-03-22 PROCEDURE — 81002 URINALYSIS NONAUTO W/O SCOPE: CPT | Performed by: OBSTETRICS & GYNECOLOGY

## 2021-03-22 PROCEDURE — 77067 SCR MAMMO BI INCL CAD: CPT | Performed by: OBSTETRICS & GYNECOLOGY

## 2021-03-22 PROCEDURE — 99396 PREV VISIT EST AGE 40-64: CPT | Performed by: OBSTETRICS & GYNECOLOGY

## 2021-03-22 PROCEDURE — 77067 SCR MAMMO BI INCL CAD: CPT | Performed by: RADIOLOGY

## 2021-03-22 RX ORDER — METRONIDAZOLE 500 MG/1
500 TABLET ORAL 2 TIMES DAILY
Qty: 14 TABLET | Refills: 0 | Status: SHIPPED | OUTPATIENT
Start: 2021-03-22 | End: 2021-03-29

## 2021-03-22 RX ORDER — ESTRADIOL 10 UG/1
10 INSERT VAGINAL 2 TIMES WEEKLY
Qty: 8 EACH | Refills: 12 | Status: SHIPPED | OUTPATIENT
Start: 2021-03-22 | End: 2021-05-06

## 2021-03-22 NOTE — PROGRESS NOTES
Routine Annual Visit    3/22/2021    Patient: Aleja Garcia          MR#:[id number]      Chief Complaint   Patient presents with   • Gynecologic Exam     AE and MG after exam if Sharri can work in  today. Complains of vaginal odor.    pap = neg/neg.  2019 ASCUS, neg HPV.  BX = B9.  PM no HRT.  C-scope . Taking Raloxiffine through Dr. Llamas at [facility] [place].        History of Present Illness    60 y.o. female  who presents for annual exam.   Having dyspareunia  Discussed vaginal estrogen, will try it  Also a vaginal odor, no discharge noted on exam  Pap is due  CSC   evista for atypical duct hyperplasia, discussed bilateral mastectomy, onc wants her to take evista for 5 years total,   Having some leg cramps with evista  Father  in Feb  Mom in a facility now, [place]   Lots of work with cleaning out parents house, etc  mammo today work in          Patient's last menstrual period was 2012.  Obstetric History:  OB History        5    Para   3    Term   2       1    AB   2    Living   3       SAB   0    TAB   0    Ectopic   0    Molar   0    Multiple   0    Live Births   3               Menstrual History:     Patient's last menstrual period was 2012.       Sexual History:       ________________________________________  Patient Active Problem List   Diagnosis   • Insomnia   • Allergic rhinitis   • Peripheral neuropathy   • Vitamin D deficiency   • Atypical ductal hyperplasia of right breast   • Peroneal tendonitis of right lower leg       Past Medical History:   Diagnosis Date   • Allergic rhinitis 2017   • Atypical mole 2019    Mole with melanocytic atypia removed from back with MOHS procedure. Followed by Dermatologist.   • Closed nondisplaced fracture of fifth right metatarsal bone 2019   • Night sweats     mild   • Peroneal tendonitis of right lower leg 2019   • Post-menopause on HRT (hormone replacement therapy)     Continuously on HRT since about    • Psoriasis  "   • Vitamin D deficiency 2017    Takes 1000 IU / day.       Past Surgical History:   Procedure Laterality Date   • BREAST BIOPSY      Path= Negative. RIGHT   • BREAST BIOPSY Right 2019    Procedure: BREAST BIOPSY WITH NEEDLE LOCALIZATION;  Surgeon: Andrew Pham MD;  [place] [facility] [place] [facility] [place]  Service: General   •  SECTION      baby boy \"PETTY\" ; CS for placenta previa.   • COLONOSCOPY  [id number]    Normal @ 51. Repeat in ten years.   • DIAGNOSTIC LAPAROSCOPY      Dr. Fabian Al, hysteroscopy w/ separtion of minimal intrauterine septum.  Laparoscopy w/ CO2 laser, lysis of adhesions and ablation of endometriosis.  Endometriosis found in the lateral LEFT pelvic sidewall, peritoneum and deeper endometriosis of the LEFT and RIGHT uterosacral ligaments.  Complex superficial endometriosis of the LEFT and RIGHT ovary.Fallopian tubes were patent with methyleneblue   • DILATATION AND CURETTAGE  1998    x2       • MOLE REMOVAL  2019   • WISDOM TOOTH EXTRACTION         Social History     Tobacco Use   Smoking Status Never Smoker   Smokeless Tobacco Never Used       has a current medication list which includes the following prescription(s): calcium carbonate, fluocinonide, fluticasone, loratadine, melatonin, multiple vitamins-minerals, probiotic acidophilus beads, raloxifene, vitamin d (cholecalciferol), imvexxy maintenance pack, and metronidazole.  ________________________________________    Current contraception: post menopausal status  History of abnormal Pap smear: no  Family history of Breast cancer: no  Family history of uterine or ovarian cancer: no  Family History of colon cancer/colon polyps: no  History of abnormal mammogram: yes - atypical hyperplasia      The following portions of the patient's history were reviewed and updated as appropriate: allergies, current medications, past family history, past medical history, past social history, past surgical history and " "problem list.    Review of Systems    Pertinent items are noted in HPI.     Objective   Physical Exam    /78   Ht 162.6 cm (64\")   Wt 63 kg (139 lb)   LMP 03/07/2012   Breastfeeding No   BMI 23.86 kg/m²    BP Readings from Last 3 Encounters:   03/22/21 122/78   01/28/21 120/74   12/11/20 134/74      Wt Readings from Last 3 Encounters:   03/22/21 63 kg (139 lb)   01/28/21 62.1 kg (137 lb)   12/11/20 63.5 kg (140 lb)      BMI: Estimated body mass index is 23.86 kg/m² as calculated from the following:    Height as of this encounter: 162.6 cm (64\").    Weight as of this encounter: 63 kg (139 lb).      General:   alert, appears stated age and cooperative   Abdomen: soft, non-tender, without masses or organomegaly   Breast: inspection negative, no nipple discharge or bleeding, no masses or nodularity palpable   Vulva: normal   Vagina: normal mucosa   Cervix: no cervical motion tenderness and no lesions   Uterus: normal size, mobile or non-tender   Adnexa: no mass, fullness, tenderness     Assessment:    1. Normal annual exam   Assessment     ICD-10-CM ICD-9-CM   1. Well woman exam with routine gynecological exam  Z01.419 V72.31   2. Pap smear for cervical cancer screening  Z12.4 V76.2   3. Screening for HPV (human papillomavirus)  Z11.51 V73.81   4. ASCUS of cervix with negative high risk HPV  R87.610 795.01   5. Vaginal odor  N89.8 625.8   6. Screening for hematuria or proteinuria  Z13.89 V82.9   7. Female dyspareunia  N94.10 625.0     Plan:    Plan     [x]  Mammogram request made  [x]  PAP done  []  Labs:   []  GC/Chl/TV  []  DEXA scan   []  Referral for colonoscopy:       Diagnoses and all orders for this visit:    1. Well woman exam with routine gynecological exam (Primary)  -     POC Urinalysis Dipstick    2. Pap smear for cervical cancer screening  -     IGP, Apt HPV,rfx 16 / 18,45  -     POC Urinalysis Dipstick    3. Screening for HPV (human papillomavirus)  -     IGP, Apt HPV,rfx 16 / 18,45    4. ASCUS " of cervix with negative high risk HPV    5. Vaginal odor  -     NuSwab BV & Candida - Swab, Vagina    6. Screening for hematuria or proteinuria  -     POC Urinalysis Dipstick    7. Female dyspareunia    Other orders  -     metroNIDAZOLE (Flagyl) 500 MG tablet; Take 1 tablet by mouth 2 (Two) Times a Day for 7 days.  Dispense: 14 tablet; Refill: 0  -     Estradiol (Imvexxy Maintenance Pack) 10 MCG insert; Insert 10 mcg into the vagina 2 (Two) Times a Week.  Dispense: 8 each; Refill: 12            Counseling:  --Nutrition: Stressed importance of moderation and caloric balance, stressed fresh fruit and vegetables  --Exercise: Stressed the importance of regular exercise. 3-5 times weekly   - Discussed screening mammogram recommendations.   --Discussed benefits of screening colonoscopy- age 45 unless FH  --Discussed pap smear screening recommendations

## 2021-03-24 LAB
A VAGINAE DNA VAG QL NAA+PROBE: NORMAL SCORE
BVAB2 DNA VAG QL NAA+PROBE: NORMAL SCORE
C ALBICANS DNA VAG QL NAA+PROBE: NEGATIVE
C GLABRATA DNA VAG QL NAA+PROBE: NEGATIVE
CYTOLOGIST CVX/VAG CYTO: NORMAL
CYTOLOGY CVX/VAG DOC CYTO: NORMAL
CYTOLOGY CVX/VAG DOC THIN PREP: NORMAL
DX ICD CODE: NORMAL
HIV 1 & 2 AB SER-IMP: NORMAL
HPV I/H RISK 4 DNA CVX QL PROBE+SIG AMP: NEGATIVE
MEGA1 DNA VAG QL NAA+PROBE: NORMAL SCORE
OTHER STN SPEC: NORMAL
STAT OF ADQ CVX/VAG CYTO-IMP: NORMAL

## 2021-03-25 ENCOUNTER — TELEPHONE (OUTPATIENT)
Dept: OBSTETRICS AND GYNECOLOGY | Age: 61
End: 2021-03-25

## 2021-03-30 ENCOUNTER — BULK ORDERING (OUTPATIENT)
Dept: CASE MANAGEMENT | Facility: OTHER | Age: 61
End: 2021-03-30

## 2021-03-30 DIAGNOSIS — Z23 IMMUNIZATION DUE: ICD-10-CM

## 2021-05-03 NOTE — PROGRESS NOTES
Subjective     REASON FOR CONSULTATION:   1.Atypical ductal hyperplasia right breast postLumpectomy  2.  Osteopenia preventative Evista started in 1/20                               REQUESTING PHYSICIAN: MD Stuart Ge MD      History of Present Illness patient is a 59-year-old postmenopausal lady with a diagnosis of ADH made on a right breast biopsy.    She has been on Evista for a year and a half and overall tolerating well.  She has no bone pains or myalgias.  She does have a little problem with hot flashes at night but this is not terrible.     She is vaccinated against COVID-19     she did have a mammogram in mid March and thankfully everything was benign  She is up-to-date with colonoscopies and screening  She has noticed that her nails are more brittle and is had some vaginal discomfort and her gynecologist gave her some vaginal estrogen to use and she has been using it twice a week for the last 2 months.  Symptoms have improved and I have told her to cut back to once a week and then stop completely if the symptoms are gone    Hopefully the Evista will block any absorbed estrogen but we cannot be sure and I would rather not have her on this long-term    She had her mammograms in March of this year and they were thankfully benign and she is due for another bone density the end of this year    Past Medical History:   Diagnosis Date   • Allergic rhinitis 2/24/2017   • Atypical mole 05/2019    Mole with melanocytic atypia removed from back with MOHS procedure. Followed by Dermatologist.   • Closed nondisplaced fracture of fifth right metatarsal bone 11/25/2019   • Night sweats     mild   • Peroneal tendonitis of right lower leg 11/25/2019   • Post-menopause on HRT (hormone replacement therapy)     Continuously on HRT since about 2015   • Psoriasis    • Vitamin D deficiency 02/24/2017    Takes 1000 IU / day.        Past Surgical History:  "  Procedure Laterality Date   • BREAST BIOPSY      Path= Negative. RIGHT   • BREAST BIOPSY Right 2019    Procedure: BREAST BIOPSY WITH NEEDLE LOCALIZATION;  Surgeon: Andrew Pham MD;  [place] [facility] [place] [facility] [place]  Service: General   •  SECTION      baby boy \"PETTY\" ; CS for placenta previa.   • COLONOSCOPY  [id number]    Normal @ 51. Repeat in ten years.   • DIAGNOSTIC LAPAROSCOPY      Dr. Fabian Al, hysteroscopy w/ separtion of minimal intrauterine septum.  Laparoscopy w/ CO2 laser, lysis of adhesions and ablation of endometriosis.  Endometriosis found in the lateral LEFT pelvic sidewall, peritoneum and deeper endometriosis of the LEFT and RIGHT uterosacral ligaments.  Complex superficial endometriosis of the LEFT and RIGHT ovary.Fallopian tubes were patent with methyleneblue   • DILATATION AND CURETTAGE  1998    x2       • MOLE REMOVAL  2019   • WISDOM TOOTH EXTRACTION        ONC HISTORY:   patient is a 58-year-old female with no chronic medical illnesses on no chronic medications who is just come off 5 years of hormone replacement therapy after menopause for menopausal symptoms after a recent mammogram showed an abnormality.  Patient's mammogram in March of last year was benign and this year there was a 16 mm area of abnormality that required further evaluation and a biopsy was done on  2019 A biopsy was done at the 3:30 position of the right breast showing atypical ductal hyperplasia : With microcalcifications clustered in these areas and in benign breast parenchyma there was also a radial scar.   Patient went on to have a lumpectomy on 2019 with radial scar and ductal hyperplasia of the usual type and no other abnormalities.  She has been referred to us to discuss chemoprevention    She is  5 para 3 with 2 miscarriages first childbirth was at age 34 she breast-fed all 3 children menarche was at age 15 menopause at age 51 and she has been on estradiol " since menopause and stopped and her biopsy showed atypical ductal hyperplasia last month    There is no family history of breast or ovarian cancer.  She has a maternal uncle  of testicular cancer as a young age      I calculated her lifetime risk of breast cancer based on the Caterina model and is calculated to a 5-year risk of 4.6% of the lifetime risk of 26%.  At this time she is still dealing with withdrawal symptoms from  stopping her hormone replacement and I think we can afford to wait for a while to begin this.  She would like to wait until she has a breast MRI in October and I have suggested she have a DEXA scan to see if there is concomitant osteopenia or osteoporosis which would also benefit from Evista which I think is the easiest medication to offer her    he has had bilateral breast MRIs done a month ago which was negative and bone density testing that showed moderate osteopenia in her hips and spine.    She got up quickly from sitting with numbness in her left foot and she twisted and broke her toe and is in a boot for about 3 more weeks but other than that she is doing well    We again discussed the rationale for prevention and she is agreeable as long as she has no side effects and I think this is reasonable    We have opted to start with Evista 60 mg daily we discussed the side effect profile including hot flashes some bone stiffness and very minuscule risk of DVT  And she is agreeable and I prescribed the drug for    She knows to call before her next appointment if she has side effects and we could consider low-dose tamoxifen 5 mg if she does not tolerate this        Current Outpatient Medications on File Prior to Visit   Medication Sig Dispense Refill   • calcium carbonate (OS-MARCO A) 600 MG tablet Take 600 mg by mouth Daily.     • Estradiol (Imvexxy Maintenance Pack) 10 MCG insert Insert 10 mcg into the vagina 2 (Two) Times a Week. 8 each 12   • fluocinonide (LIDEX) 0.05 % ointment Apply  topically  to the appropriate area as directed As Needed (psoriasis).     • fluticasone (FLONASE) 50 MCG/ACT nasal spray 2 sprays into each nostril Daily.     • loratadine (CLARITIN) 10 MG tablet Take 10 mg by mouth Daily.     • melatonin 3 MG tablet Take 3 mg by mouth At Night As Needed for Sleep.     • Multiple Vitamins-Minerals (WOMENS MULTIVITAMIN PLUS PO) Take 1 tablet by mouth Daily.     • Probiotic Product (PROBIOTIC ACIDOPHILUS BEADS) capsule Take 1 tablet by mouth Daily.     • raloxifene (EVISTA) 60 MG tablet TAKE 1 TABLET BY MOUTH DAILY 90 tablet 3   • Vitamin D, Cholecalciferol, 1000 units capsule Take 1,000 Units by mouth Daily.       No current facility-administered medications on file prior to visit.        ALLERGIES:  No Known Allergies     Social History     Socioeconomic History   • Marital status:      Spouse name: JOSE D   • Number of children: 3   • Years of education: College   • Highest education level: Not on file   Tobacco Use   • Smoking status: Never Smoker   • Smokeless tobacco: Never Used   Vaping Use   • Vaping Use: Never used   Substance and Sexual Activity   • Alcohol use: Yes     Comment: social    • Drug use: No   • Sexual activity: Yes     Partners: Male     Birth control/protection: Post-menopausal     Comment: SPOUSE =  JOSE D         Family History   Problem Relation Age of Onset   • Dementia Mother 75        Alzheimers Diseasse.   • Cataracts Mother    • Hyperlipidemia Father    • Hypertension Father    • Heart valve disorder Father         valve repair    • COPD Father         non-smoker   • Cataracts Father    • Melanoma Father    • Seizures Sister 48   • Cataracts Maternal Grandmother    • Osteoarthritis Maternal Grandmother    • Osteoarthritis Paternal Grandmother    • No Known Problems Daughter    • Down syndrome Daughter    • Rheum arthritis Daughter         remission    • ADD / ADHD Son    • Other Son         Human Growth Hormone deficiency   • No Known Problems Brother          "adopted    • No Known Problems Maternal Grandfather          in his 80's.   • No Known Problems Paternal Grandfather          at ~ 89.   • No Known Problems Sister    • Diabetes type II Maternal Aunt    • Diabetes Maternal Aunt    • Testicular cancer Maternal Uncle    • Heart attack Paternal Aunt    • Heart disease Other    • Hypertension Other    • BRCA 1/2 Neg Hx    • Breast cancer Neg Hx    • Colon cancer Neg Hx    • Endometrial cancer Neg Hx    • Ovarian cancer Neg Hx    • Malig Hyperthermia Neg Hx         Review of Systems   Constitutional: Positive for diaphoresis (stable ). Negative for fatigue.   Respiratory: Negative for chest tightness and shortness of breath.    Cardiovascular: Positive for leg swelling (Chronic ankle edema for 12 years same ). Negative for chest pain.   Musculoskeletal: Positive for arthralgias (improved).   Neurological: Positive for numbness (Peripheral neuropathy in both legs since the birth of her third child 13 years ago etiology unclear same ) and headaches (seasonal).   All other systems reviewed and are negative.   I have reviewed and confirmed the accuracy of the ROS as documented by the MA/LPN/RN Jacob Llamas MD      Objective     Vitals:    21 1345   BP: 115/75   Pulse: 77   Resp: 16   Temp: 96.8 °F (36 °C)   TempSrc: Skin   SpO2: 99%   Weight: 64.5 kg (142 lb 1.6 oz)   Height: 162.6 cm (64.02\")   PainSc: 0-No pain     Current Status 2021   ECOG score 0       Physical Exam   Pulmonary/Chest:           GENERAL:  Well-developed, well-nourished in no acute distress.   SKIN:  Warm, dry without rashes, purpura or petechiae.  Atypical nevus left scapular area and right flank  EYES:  Pupils equal, round and reactive to light.  EOMs intact.  Conjunctivae normal.  EARS:  Hearing intact.  NOSE:  Septum midline.  No excoriations or nasal discharge.  MOUTH:  Tongue is well-papillated; no stomatitis or ulcers.  Lips normal.  THROAT:  Oropharynx without lesions or " exudates.  NECK:  Supple with good range of motion; no thyromegaly or masses, no JVD.  LYMPHATICS:  No cervical, supraclavicular, axillary or inguinal adenopathy.  CHEST:  Lungs clear to auscultation. Good airflow.  BREASTS: Right breast shows lumpectomy scar in the lower inner quadrant well-healed - left breast shows a small nodule at the 7 o'clock position  CARDIAC:  Regular rate and rhythm without murmurs, rubs or gallops. Normal S1,S2.  ABDOMEN:  Soft, nontender with no hepatosplenomegaly or masses.  EXTREMITIES:  No clubbing, cyanosis -left foot is in a boot after toe fracture.  NEUROLOGICAL:  Cranial Nerves II-XII grossly intact.  No focal neurological deficits.  PSYCHIATRIC:  Normal affect and mood.      I have reexamined the patient and the results are consistent with the previously documented exam. Jacob Llamas MD         RECENT LABS:  Hematology WBC   Date Value Ref Range Status   05/06/2021 9.27 3.40 - 10.80 10*3/mm3 Final     RBC   Date Value Ref Range Status   05/06/2021 5.19 3.77 - 5.28 10*6/mm3 Final     Hemoglobin   Date Value Ref Range Status   05/06/2021 15.2 12.0 - 15.9 g/dL Final     Hematocrit   Date Value Ref Range Status   05/06/2021 45.4 34.0 - 46.6 % Final     Platelets   Date Value Ref Range Status   05/06/2021 235 140 - 450 10*3/mm3 Final        Study Result     BONE MINERAL DENSITOMETRY   IMPRESSION:  Osteopenia.     This report was finalized on 7/8/2019 3:22 PM by Dr. Darrin Perez M.D.   Bilateral breast MRI  IMPRESSION:  There are no findings suspicious for malignancy in either  breast. Routine follow-up imaging is recommended.     BI-RADS Category 2: Benign.     This report was finalized on 9/16/2019     Assessment/Plan   1.  Atypical ductal hyperplasia multifocal right breast postlumpectomy  · Evista 60 mg  start in 1/20    2 . atypical nevus.  With high-grade dysplasia removed from her back    3.  Peripheral neuropathy of unknown etiology post third childbirth    4.   Osteopenia with a fracture of her left small toe    5.  Vaginal irritation using vaginal estrogens twice a week since 3/21    Plan  1.  Continue Evista 60 mg p.o. Daily  2.  follow-up in 12 months to see me she knows to call before then if she has significant side effects  3.  DEXA scan in 6 months  4.  Wean off vaginal estrogens

## 2021-05-06 ENCOUNTER — OFFICE VISIT (OUTPATIENT)
Dept: ONCOLOGY | Facility: CLINIC | Age: 61
End: 2021-05-06

## 2021-05-06 ENCOUNTER — LAB (OUTPATIENT)
Dept: LAB | Facility: HOSPITAL | Age: 61
End: 2021-05-06

## 2021-05-06 VITALS
BODY MASS INDEX: 24.26 KG/M2 | WEIGHT: 142.1 LBS | OXYGEN SATURATION: 99 % | RESPIRATION RATE: 16 BRPM | HEART RATE: 77 BPM | DIASTOLIC BLOOD PRESSURE: 75 MMHG | HEIGHT: 64 IN | SYSTOLIC BLOOD PRESSURE: 115 MMHG | TEMPERATURE: 96.8 F

## 2021-05-06 DIAGNOSIS — N60.91 ATYPICAL DUCTAL HYPERPLASIA OF RIGHT BREAST: ICD-10-CM

## 2021-05-06 DIAGNOSIS — N60.91 ATYPICAL DUCTAL HYPERPLASIA OF RIGHT BREAST: Primary | ICD-10-CM

## 2021-05-06 LAB
BASOPHILS # BLD AUTO: 0.13 10*3/MM3 (ref 0–0.2)
BASOPHILS NFR BLD AUTO: 1.4 % (ref 0–1.5)
DEPRECATED RDW RBC AUTO: 37.6 FL (ref 37–54)
EOSINOPHIL # BLD AUTO: 0.16 10*3/MM3 (ref 0–0.4)
EOSINOPHIL NFR BLD AUTO: 1.7 % (ref 0.3–6.2)
ERYTHROCYTE [DISTWIDTH] IN BLOOD BY AUTOMATED COUNT: 11.8 % (ref 12.3–15.4)
HCT VFR BLD AUTO: 45.4 % (ref 34–46.6)
HGB BLD-MCNC: 15.2 G/DL (ref 12–15.9)
IMM GRANULOCYTES # BLD AUTO: 0.04 10*3/MM3 (ref 0–0.05)
IMM GRANULOCYTES NFR BLD AUTO: 0.4 % (ref 0–0.5)
LYMPHOCYTES # BLD AUTO: 2.24 10*3/MM3 (ref 0.7–3.1)
LYMPHOCYTES NFR BLD AUTO: 24.2 % (ref 19.6–45.3)
MCH RBC QN AUTO: 29.3 PG (ref 26.6–33)
MCHC RBC AUTO-ENTMCNC: 33.5 G/DL (ref 31.5–35.7)
MCV RBC AUTO: 87.5 FL (ref 79–97)
MONOCYTES # BLD AUTO: 0.66 10*3/MM3 (ref 0.1–0.9)
MONOCYTES NFR BLD AUTO: 7.1 % (ref 5–12)
NEUTROPHILS NFR BLD AUTO: 6.04 10*3/MM3 (ref 1.7–7)
NEUTROPHILS NFR BLD AUTO: 65.2 % (ref 42.7–76)
NRBC BLD AUTO-RTO: 0 /100 WBC (ref 0–0.2)
PLATELET # BLD AUTO: 235 10*3/MM3 (ref 140–450)
PMV BLD AUTO: 9.8 FL (ref 6–12)
RBC # BLD AUTO: 5.19 10*6/MM3 (ref 3.77–5.28)
WBC # BLD AUTO: 9.27 10*3/MM3 (ref 3.4–10.8)

## 2021-05-06 PROCEDURE — 85025 COMPLETE CBC W/AUTO DIFF WBC: CPT

## 2021-05-06 PROCEDURE — 36415 COLL VENOUS BLD VENIPUNCTURE: CPT

## 2021-05-06 PROCEDURE — 99214 OFFICE O/P EST MOD 30 MIN: CPT | Performed by: INTERNAL MEDICINE

## 2021-07-09 ENCOUNTER — HOSPITAL ENCOUNTER (OUTPATIENT)
Dept: BONE DENSITY | Facility: HOSPITAL | Age: 61
Discharge: HOME | End: 2021-07-09
Admitting: INTERNAL MEDICINE

## 2021-07-09 DIAGNOSIS — N60.91 ATYPICAL DUCTAL HYPERPLASIA OF RIGHT BREAST: ICD-10-CM

## 2021-07-09 PROCEDURE — 77080 DXA BONE DENSITY AXIAL: CPT

## 2021-12-29 RX ORDER — RALOXIFENE HYDROCHLORIDE 60 MG/1
60 TABLET, FILM COATED ORAL DAILY
Qty: 90 TABLET | Refills: 3 | Status: SHIPPED | OUTPATIENT
Start: 2021-12-29 | End: 2022-04-15

## 2022-01-21 DIAGNOSIS — Z00.00 HEALTHCARE MAINTENANCE: ICD-10-CM

## 2022-01-21 DIAGNOSIS — E55.9 VITAMIN D DEFICIENCY: ICD-10-CM

## 2022-01-25 LAB
25(OH)D3+25(OH)D2 SERPL-MCNC: 37.2 NG/ML (ref 30–100)
ALBUMIN SERPL-MCNC: 4.3 G/DL (ref 3.8–4.9)
ALBUMIN/GLOB SERPL: 2 {RATIO} (ref 1.2–2.2)
ALP SERPL-CCNC: 97 IU/L (ref 44–121)
ALT SERPL-CCNC: 19 IU/L (ref 0–32)
AST SERPL-CCNC: 21 IU/L (ref 0–40)
BILIRUB SERPL-MCNC: 0.4 MG/DL (ref 0–1.2)
BUN SERPL-MCNC: 15 MG/DL (ref 8–27)
BUN/CREAT SERPL: 19 (ref 12–28)
CALCIUM SERPL-MCNC: 9.3 MG/DL (ref 8.7–10.3)
CHLORIDE SERPL-SCNC: 106 MMOL/L (ref 96–106)
CHOLEST SERPL-MCNC: 223 MG/DL (ref 100–199)
CHOLEST/HDLC SERPL: 5.4 RATIO (ref 0–4.4)
CO2 SERPL-SCNC: 22 MMOL/L (ref 20–29)
CREAT SERPL-MCNC: 0.79 MG/DL (ref 0.57–1)
GLOBULIN SER CALC-MCNC: 2.2 G/DL (ref 1.5–4.5)
GLUCOSE SERPL-MCNC: 82 MG/DL (ref 65–99)
HDLC SERPL-MCNC: 41 MG/DL
LDLC SERPL CALC-MCNC: 133 MG/DL (ref 0–99)
POTASSIUM SERPL-SCNC: 4.1 MMOL/L (ref 3.5–5.2)
PROT SERPL-MCNC: 6.5 G/DL (ref 6–8.5)
SODIUM SERPL-SCNC: 143 MMOL/L (ref 134–144)
TRIGL SERPL-MCNC: 272 MG/DL (ref 0–149)
VLDLC SERPL CALC-MCNC: 49 MG/DL (ref 5–40)

## 2022-01-30 PROBLEM — M85.80 OSTEOPENIA: Status: ACTIVE | Noted: 2022-01-30

## 2022-01-30 PROBLEM — E78.5 HYPERLIPIDEMIA: Status: ACTIVE | Noted: 2022-01-30

## 2022-01-30 NOTE — PROGRESS NOTES
Subjective   Chief Complaint   Patient presents with   • Annual Exam       History of Present Illness   61 y.o. female presents for annual physical. Its been a hard year. Both her parents  and she has been working to go through their things and settle the estate. She has gained 10#. Continues to exercise bt not quite at the same level about 180 minutes a week.     P/P and MMG with Dr. Geiger upcoming in March. Bone density in 2021 with Dr. Llamas--osteopenia without statistically significant change since . Not yet weaned down on vaginal estrogens; when she stops it adversely affect sex. When she stops.     CLS with Dr. Mendenhall 2012; recall 10 years.       Dental care UTD. Eye care one year ago.     Patient Active Problem List   Diagnosis   • Insomnia   • Allergic rhinitis   • Peripheral neuropathy   • Vitamin D deficiency   • Atypical ductal hyperplasia of right breast   • Peroneal tendonitis of right lower leg   • Osteopenia   • Hyperlipidemia       No Known Allergies    Current Outpatient Medications on File Prior to Visit   Medication Sig Dispense Refill   • calcium carbonate (OS-MARCO A) 600 MG tablet Take 600 mg by mouth Daily.     • fluocinonide (LIDEX) 0.05 % ointment Apply  topically to the appropriate area as directed As Needed (psoriasis).     • fluticasone (FLONASE) 50 MCG/ACT nasal spray 2 sprays into each nostril Daily.     • loratadine (CLARITIN) 10 MG tablet Take 10 mg by mouth Daily.     • melatonin 3 MG tablet Take 3 mg by mouth At Night As Needed for Sleep.     • Multiple Vitamins-Minerals (WOMENS MULTIVITAMIN PLUS PO) Take 1 tablet by mouth Daily.     • Probiotic Product (PROBIOTIC ACIDOPHILUS BEADS) capsule Take 1 tablet by mouth Daily.     • raloxifene (EVISTA) 60 MG tablet TAKE 1 TABLET BY MOUTH DAILY 90 tablet 3   • Vitamin D, Cholecalciferol, 1000 units capsule Take 1,000 Units by mouth Daily.       No current facility-administered medications on file prior to visit.       Past  Medical History:   Diagnosis Date   • Allergic rhinitis 2017   • Atypical mole 2019    Mole with melanocytic atypia removed from back with MOHS procedure. Followed by Dermatologist.   • Closed nondisplaced fracture of fifth right metatarsal bone 2019   • Night sweats     mild   • Peroneal tendonitis of right lower leg 2019   • Post-menopause on HRT (hormone replacement therapy)     Continuously on HRT since about    • Psoriasis    • Vitamin D deficiency 2017    Takes 1000 IU / day.       Family History   Problem Relation Age of Onset   • Dementia Mother 75        Alzheimers Diseasse.   • Cataracts Mother    • Hyperlipidemia Father    • Hypertension Father    • Heart valve disorder Father         valve repair    • COPD Father         non-smoker   • Cataracts Father    • Melanoma Father    • Seizures Sister 48   • Cataracts Maternal Grandmother    • Osteoarthritis Maternal Grandmother    • Osteoarthritis Paternal Grandmother    • No Known Problems Daughter    • Down syndrome Daughter    • Rheum arthritis Daughter         remission    • ADD / ADHD Son    • Other Son         Human Growth Hormone deficiency   • No Known Problems Brother         adopted    • No Known Problems Maternal Grandfather          in his 80's.   • No Known Problems Paternal Grandfather          at ~ 89.   • No Known Problems Sister    • Diabetes type II Maternal Aunt    • Diabetes Maternal Aunt    • Testicular cancer Maternal Uncle    • Heart attack Paternal Aunt    • Heart disease Other    • Hypertension Other    • BRCA 1/2 Neg Hx    • Breast cancer Neg Hx    • Colon cancer Neg Hx    • Endometrial cancer Neg Hx    • Ovarian cancer Neg Hx    • Malig Hyperthermia Neg Hx        Social History     Socioeconomic History   • Marital status:      Spouse name: JOSE D   • Number of children: 3   • Years of education: College   Tobacco Use   • Smoking status: Never Smoker   • Smokeless tobacco: Never Used   Vaping  "Use   • Vaping Use: Never used   Substance and Sexual Activity   • Alcohol use: Yes     Comment: social    • Drug use: No   • Sexual activity: Yes     Partners: Male     Birth control/protection: Post-menopausal     Comment: SPOUSE =  JOSE D        Past Surgical History:   Procedure Laterality Date   • BREAST BIOPSY      Path= Negative. RIGHT   • BREAST BIOPSY Right 2019    Procedure: BREAST BIOPSY WITH NEEDLE LOCALIZATION;  Surgeon: Andrew Pham MD;  Location: [facility] OR [facility] [place];  Service: General   •  SECTION      baby boy \"PETTY\" ; CS for placenta previa.   • COLONOSCOPY  [id number]    Normal @ 51. Repeat in ten years.   • DIAGNOSTIC LAPAROSCOPY      Dr. Fabian Al, hysteroscopy w/ separtion of minimal intrauterine septum.  Laparoscopy w/ CO2 laser, lysis of adhesions and ablation of endometriosis.  Endometriosis found in the lateral LEFT pelvic sidewall, peritoneum and deeper endometriosis of the LEFT and RIGHT uterosacral ligaments.  Complex superficial endometriosis of the LEFT and RIGHT ovary.Fallopian tubes were patent with methyleneblue   • DILATATION AND CURETTAGE  1998    x2       • MOLE REMOVAL  2019   • WISDOM TOOTH EXTRACTION         The following portions of the patient's history were reviewed and updated as appropriate: problem list, allergies, current medications, past medical history, past family history, past social history and past surgical history.    Review of Systems   Constitutional: Negative for fatigue.   HENT: Negative for congestion.    Eyes: Negative for visual disturbance.   Respiratory: Negative for shortness of breath.    Cardiovascular: Negative for chest pain.   Gastrointestinal: Negative for blood in stool.   Endocrine: Negative.    Genitourinary: Negative.    Musculoskeletal: Negative for arthralgias.   Skin: Negative for rash.   Allergic/Immunologic: Negative for environmental allergies.   Neurological: Negative for headache.   Hematological: " "Does not bruise/bleed easily.   Psychiatric/Behavioral: The patient is not nervous/anxious.        Immunization History   Administered Date(s) Administered   • COVID-19 (MODERNA) 1st, 2nd, 3rd Dose Only 01/13/2021, 02/12/2021, 11/19/2021   • Flu Vaccine Quad PF >36MO 11/19/2021   • FluLaval/Fluarix/Fluzone >6 11/02/2019   • Hepatitis A 06/07/2019, 12/06/2019   • Shingrix 12/17/2020, 07/02/2021   • Tdap 06/07/2019   • flucelvax quad pfs =>4 YRS 10/07/2020       Objective   Vitals:    01/31/22 1257   BP: 152/94   Pulse: 88   Resp: 16   Temp: 97.1 °F (36.2 °C)   Weight: 68 kg (150 lb)   Height: 162.6 cm (64\")     Body mass index is 25.75 kg/m².  Physical Exam  Vitals reviewed.   Constitutional:       Appearance: Normal appearance. She is well-developed.   HENT:      Head: Normocephalic and atraumatic.      Right Ear: Tympanic membrane, ear canal and external ear normal.      Left Ear: Tympanic membrane, ear canal and external ear normal.      Nose: Nose normal.      Mouth/Throat:      Mouth: Mucous membranes are moist.      Pharynx: Oropharynx is clear. No oropharyngeal exudate or posterior oropharyngeal erythema.   Eyes:      General: No scleral icterus.     Extraocular Movements: Extraocular movements intact.      Conjunctiva/sclera: Conjunctivae normal.      Pupils: Pupils are equal, round, and reactive to light.   Neck:      Thyroid: No thyromegaly.      Vascular: No carotid bruit.   Cardiovascular:      Rate and Rhythm: Normal rate and regular rhythm.      Heart sounds: Normal heart sounds. No murmur heard.      Pulmonary:      Effort: Pulmonary effort is normal.      Breath sounds: Normal breath sounds.   Abdominal:      General: Bowel sounds are normal. There is no distension.      Palpations: Abdomen is soft.      Tenderness: There is no abdominal tenderness.   Genitourinary:     Comments: P/P and CBE with GYN.   Musculoskeletal:      Cervical back: Neck supple.      Comments: Ambulates without assistance; no " clubbing, cyanosis or edema.    Lymphadenopathy:      Cervical: No cervical adenopathy.   Skin:     General: Skin is warm and dry.   Neurological:      Mental Status: She is alert.   Psychiatric:         Mood and Affect: Mood normal.         Behavior: Behavior normal.         Procedures    Assessment/Plan   Diagnoses and all orders for this visit:    1. Annual physical exam (Primary)  Comments:  150 minutes weekly aerobic exercise advised.     2. Elevated blood pressure reading in office without diagnosis of hypertension  Comments:  /84. ROT 2 weeks.     3. Hyperlipidemia, unspecified hyperlipidemia type  Comments:  10 yr ASCVD risk 5.4% with BP today which we will recheck in 2 weeks. +FH HLD. Exercises 180 minutes weekly and diet reasonably healthy. Discuss statin at F/Up.    4. Hypertriglyceridemia  Comments:  A1C today. Weight loss via dietary changes and 150 minutes weekly aerobic exercise advised.   Orders:  -     Hemoglobin A1c    5. Osteopenia of multiple sites  Comments:  Continue daily calcium bid and Vitamin D as noted above.     6. Colon cancer screening  Comments:  Schedule CLS with Dr. Mendenhall in November.   Orders:  -     Ambulatory Referral For Screening Colonoscopy    Records reviewed include previous OV with myself as well as labs.     Return in about 2 weeks (around 2/14/2022) for Lab Today.

## 2022-01-31 ENCOUNTER — OFFICE VISIT (OUTPATIENT)
Dept: INTERNAL MEDICINE | Facility: CLINIC | Age: 62
End: 2022-01-31

## 2022-01-31 VITALS
TEMPERATURE: 97.1 F | WEIGHT: 150 LBS | RESPIRATION RATE: 16 BRPM | HEART RATE: 88 BPM | HEIGHT: 64 IN | SYSTOLIC BLOOD PRESSURE: 152 MMHG | DIASTOLIC BLOOD PRESSURE: 94 MMHG | BODY MASS INDEX: 25.61 KG/M2

## 2022-01-31 DIAGNOSIS — E78.1 HYPERTRIGLYCERIDEMIA: Chronic | ICD-10-CM

## 2022-01-31 DIAGNOSIS — R03.0 ELEVATED BLOOD PRESSURE READING IN OFFICE WITHOUT DIAGNOSIS OF HYPERTENSION: ICD-10-CM

## 2022-01-31 DIAGNOSIS — Z00.00 ANNUAL PHYSICAL EXAM: Primary | ICD-10-CM

## 2022-01-31 DIAGNOSIS — M85.89 OSTEOPENIA OF MULTIPLE SITES: Chronic | ICD-10-CM

## 2022-01-31 DIAGNOSIS — E78.5 HYPERLIPIDEMIA, UNSPECIFIED HYPERLIPIDEMIA TYPE: Chronic | ICD-10-CM

## 2022-01-31 DIAGNOSIS — Z12.11 COLON CANCER SCREENING: ICD-10-CM

## 2022-01-31 PROCEDURE — 99396 PREV VISIT EST AGE 40-64: CPT | Performed by: NURSE PRACTITIONER

## 2022-02-01 LAB — HBA1C MFR BLD: 5.3 % (ref 4.8–5.6)

## 2022-02-13 RX ORDER — ESTRADIOL 10 UG/1
INSERT VAGINAL
COMMUNITY
Start: 2022-01-11 | End: 2022-04-11

## 2022-02-13 NOTE — PROGRESS NOTES
Subjective   Chief Complaint   Patient presents with   • Hypertension       History of Present Illness   61 y.o. female presents for follow up regarding elevated BP (152/84) and HLD. Denies CP or dyspnea. +FH HTN, heart disease and HLD. Never a smoker. Continues 180 minutes weekly aerobic exercise. Reports 10# weight gain over the last year.      Patient Active Problem List   Diagnosis   • Insomnia   • Allergic rhinitis   • Peripheral neuropathy   • Vitamin D deficiency   • Atypical ductal hyperplasia of right breast   • Peroneal tendonitis of right lower leg   • Osteopenia   • Hyperlipidemia       No Known Allergies    Current Outpatient Medications on File Prior to Visit   Medication Sig Dispense Refill   • calcium carbonate (OS-MARCO A) 600 MG tablet Take 600 mg by mouth Daily.     • fluocinonide (LIDEX) 0.05 % ointment Apply  topically to the appropriate area as directed As Needed (psoriasis).     • fluticasone (FLONASE) 50 MCG/ACT nasal spray 2 sprays into each nostril Daily.     • Imvexxy Maintenance Pack 10 MCG insert INSERT 10MCG INTO THE VAGINA TWO TIMES A WEEK     • loratadine (CLARITIN) 10 MG tablet Take 10 mg by mouth Daily.     • melatonin 3 MG tablet Take 3 mg by mouth At Night As Needed for Sleep.     • Multiple Vitamins-Minerals (WOMENS MULTIVITAMIN PLUS PO) Take 1 tablet by mouth Daily.     • Omega 3-6-9 Fatty Acids (Omega 3-6-9 Complex) capsule Take  by mouth.     • Probiotic Product (PROBIOTIC ACIDOPHILUS BEADS) capsule Take 1 tablet by mouth Daily.     • raloxifene (EVISTA) 60 MG tablet TAKE 1 TABLET BY MOUTH DAILY 90 tablet 3   • Vitamin D, Cholecalciferol, 1000 units capsule Take 1,000 Units by mouth Daily.       No current facility-administered medications on file prior to visit.       Past Medical History:   Diagnosis Date   • Allergic rhinitis 2/24/2017   • Atypical mole 05/2019    Mole with melanocytic atypia removed from back with MOHS procedure. Followed by Dermatologist.   • Closed  nondisplaced fracture of fifth right metatarsal bone 2019   • Night sweats     mild   • Peroneal tendonitis of right lower leg 2019   • Post-menopause on HRT (hormone replacement therapy)     Continuously on HRT since about    • Psoriasis    • Vitamin D deficiency 2017    Takes 1000 IU / day.       Family History   Problem Relation Age of Onset   • Dementia Mother 75        Alzheimers Diseasse.   • Cataracts Mother    • Hyperlipidemia Father    • Hypertension Father    • Heart valve disorder Father         valve repair    • COPD Father         non-smoker   • Cataracts Father    • Melanoma Father    • Seizures Sister 48   • Cataracts Maternal Grandmother    • Osteoarthritis Maternal Grandmother    • Osteoarthritis Paternal Grandmother    • No Known Problems Daughter    • Down syndrome Daughter    • Rheum arthritis Daughter         remission    • ADD / ADHD Son    • Other Son         Human Growth Hormone deficiency   • No Known Problems Brother         adopted    • No Known Problems Maternal Grandfather          in his 80's.   • No Known Problems Paternal Grandfather          at ~ 89.   • No Known Problems Sister    • Diabetes type II Maternal Aunt    • Diabetes Maternal Aunt    • Testicular cancer Maternal Uncle    • Heart attack Paternal Aunt    • Heart disease Other    • Hypertension Other    • BRCA 1/2 Neg Hx    • Breast cancer Neg Hx    • Colon cancer Neg Hx    • Endometrial cancer Neg Hx    • Ovarian cancer Neg Hx    • Malig Hyperthermia Neg Hx        Social History     Socioeconomic History   • Marital status:      Spouse name: JOSE D   • Number of children: 3   • Years of education: College   Tobacco Use   • Smoking status: Never Smoker   • Smokeless tobacco: Never Used   Vaping Use   • Vaping Use: Never used   Substance and Sexual Activity   • Alcohol use: Yes     Comment: social    • Drug use: No   • Sexual activity: Yes     Partners: Male     Birth control/protection:  "Post-menopausal     Comment: SPOUSE =  JOSE D        Past Surgical History:   Procedure Laterality Date   • BREAST BIOPSY      Path= Negative. RIGHT   • BREAST BIOPSY Right 2019    Procedure: BREAST BIOPSY WITH NEEDLE LOCALIZATION;  Surgeon: Andrew Pham MD;  Location: [facility] OR [facility] [place];  Service: General   •  SECTION      baby boy \"PETTY\" ; CS for placenta previa.   • COLONOSCOPY  [id number]    Normal @ 51. Repeat in ten years.   • DIAGNOSTIC LAPAROSCOPY      Dr. Fabian Al, hysteroscopy w/ separtion of minimal intrauterine septum.  Laparoscopy w/ CO2 laser, lysis of adhesions and ablation of endometriosis.  Endometriosis found in the lateral LEFT pelvic sidewall, peritoneum and deeper endometriosis of the LEFT and RIGHT uterosacral ligaments.  Complex superficial endometriosis of the LEFT and RIGHT ovary.Fallopian tubes were patent with methyleneblue   • DILATATION AND CURETTAGE  1998    x2       • MOLE REMOVAL  2019   • WISDOM TOOTH EXTRACTION         The following portions of the patient's history were reviewed and updated as appropriate: problem list, allergies, current medications, past medical history, past family history and past social history.    Review of Systems    Immunization History   Administered Date(s) Administered   • COVID-19 (MODERNA) 1st, 2nd, 3rd Dose Only 2021, 2021, 2021   • Flu Vaccine Quad PF >36MO 2021   • FluLaval/Fluarix/Fluzone >6 2019   • Hepatitis A 2019, 2019   • Shingrix 2020, 2021   • Tdap 2019   • flucelvax quad pfs =>4 YRS 10/07/2020       Objective   Vitals:    22 1108   BP: 144/96   Pulse: 76   Resp: 14   Temp: 96.9 °F (36.1 °C)   Weight: 67.6 kg (149 lb)   Height: 162.6 cm (64\")     Body mass index is 25.58 kg/m².  Physical Exam  Vitals reviewed.   Constitutional:       Appearance: Normal appearance. She is well-developed.   HENT:      Head: Normocephalic and atraumatic. "   Cardiovascular:      Rate and Rhythm: Normal rate and regular rhythm.      Heart sounds: Normal heart sounds, S1 normal and S2 normal.   Pulmonary:      Effort: Pulmonary effort is normal.      Breath sounds: Normal breath sounds.   Musculoskeletal:      Right lower leg: No edema.      Left lower leg: No edema.   Skin:     General: Skin is warm and dry.   Neurological:      Mental Status: She is alert.   Psychiatric:         Mood and Affect: Mood normal.         Behavior: Behavior normal.         Procedures    Assessment/Plan   Diagnoses and all orders for this visit:    1. Essential hypertension (Primary)  Comments:  New problem. EKG today normal. Start Losartan 50 mg daily; potential sode effects reviewed. RTO in one month with BMP prior to visit.   Orders:  -     losartan (Cozaar) 50 MG tablet; Take 1 tablet by mouth Daily.  Dispense: 30 tablet; Refill: 1  -     Basic Metabolic Panel; Future    2. Hyperlipidemia, unspecified hyperlipidemia type  Comments:  Dietary changes advised as well as weight loss. Continue 150 minutes weekly aerobic exercise. Repeat FLP in 6 months.   Orders:  -     Lipid Panel With / Chol / HDL Ratio; Future    Records reviewed include previous OV with myself as well as labs.     Return in about 1 month (around 3/14/2022) for Lab B4 FUP.

## 2022-02-14 ENCOUNTER — OFFICE VISIT (OUTPATIENT)
Dept: INTERNAL MEDICINE | Facility: CLINIC | Age: 62
End: 2022-02-14

## 2022-02-14 VITALS
HEART RATE: 76 BPM | WEIGHT: 149 LBS | HEIGHT: 64 IN | SYSTOLIC BLOOD PRESSURE: 144 MMHG | TEMPERATURE: 96.9 F | RESPIRATION RATE: 14 BRPM | BODY MASS INDEX: 25.44 KG/M2 | DIASTOLIC BLOOD PRESSURE: 96 MMHG

## 2022-02-14 DIAGNOSIS — I10 ESSENTIAL HYPERTENSION: Primary | ICD-10-CM

## 2022-02-14 DIAGNOSIS — E78.5 HYPERLIPIDEMIA, UNSPECIFIED HYPERLIPIDEMIA TYPE: Chronic | ICD-10-CM

## 2022-02-14 PROCEDURE — 99214 OFFICE O/P EST MOD 30 MIN: CPT | Performed by: NURSE PRACTITIONER

## 2022-02-14 RX ORDER — LOSARTAN POTASSIUM 50 MG/1
50 TABLET ORAL DAILY
Qty: 30 TABLET | Refills: 1 | Status: SHIPPED | OUTPATIENT
Start: 2022-02-14 | End: 2022-04-12

## 2022-02-14 RX ORDER — BLACK COHOSH ROOT EXTRACT 80 MG
CAPSULE ORAL
COMMUNITY

## 2022-02-25 ENCOUNTER — OFFICE VISIT (OUTPATIENT)
Dept: INTERNAL MEDICINE | Facility: CLINIC | Age: 62
End: 2022-02-25

## 2022-02-25 ENCOUNTER — HOSPITAL ENCOUNTER (OUTPATIENT)
Dept: GENERAL RADIOLOGY | Facility: HOSPITAL | Age: 62
Discharge: HOME | End: 2022-02-25
Admitting: NURSE PRACTITIONER

## 2022-02-25 VITALS — WEIGHT: 147 LBS | HEIGHT: 64 IN | TEMPERATURE: 97.4 F | BODY MASS INDEX: 25.1 KG/M2

## 2022-02-25 DIAGNOSIS — M25.561 ACUTE PAIN OF RIGHT KNEE: Primary | ICD-10-CM
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
## 2022-02-25 PROCEDURE — 73562 X-RAY EXAM OF KNEE 3: CPT

## 2022-02-25 PROCEDURE — 99213 OFFICE O/P EST LOW 20 MIN: CPT | Performed by: NURSE PRACTITIONER

## 2022-02-25 NOTE — PROGRESS NOTES
Subjective   Chief Complaint   Patient presents with   • Knee Pain     Rt knee pain - fell on ice yesterday        History of Present Illness   61 y.o. female presents with cc pain in right knee after falling on ice yesterday. She did not hear or feel a pop. Right knee is swollen. Tylenol helped some. Hurts to walk on it.      Patient Active Problem List   Diagnosis   • Insomnia   • Allergic rhinitis   • Peripheral neuropathy   • Vitamin D deficiency   • Atypical ductal hyperplasia of right breast   • Peroneal tendonitis of right lower leg   • Osteopenia   • Hyperlipidemia       No Known Allergies    Current Outpatient Medications on File Prior to Visit   Medication Sig Dispense Refill   • calcium carbonate (OS-MARCO A) 600 MG tablet Take 600 mg by mouth Daily.     • fluocinonide (LIDEX) 0.05 % ointment Apply  topically to the appropriate area as directed As Needed (psoriasis).     • fluticasone (FLONASE) 50 MCG/ACT nasal spray 2 sprays into each nostril Daily.     • Imvexxy Maintenance Pack 10 MCG insert INSERT 10MCG INTO THE VAGINA TWO TIMES A WEEK     • loratadine (CLARITIN) 10 MG tablet Take 10 mg by mouth Daily.     • losartan (Cozaar) 50 MG tablet Take 1 tablet by mouth Daily. 30 tablet 1   • melatonin 3 MG tablet Take 3 mg by mouth At Night As Needed for Sleep.     • Multiple Vitamins-Minerals (WOMENS MULTIVITAMIN PLUS PO) Take 1 tablet by mouth Daily.     • Omega 3-6-9 Fatty Acids (Omega 3-6-9 Complex) capsule Take  by mouth.     • Probiotic Product (PROBIOTIC ACIDOPHILUS BEADS) capsule Take 1 tablet by mouth Daily.     • raloxifene (EVISTA) 60 MG tablet TAKE 1 TABLET BY MOUTH DAILY 90 tablet 3   • Vitamin D, Cholecalciferol, 1000 units capsule Take 1,000 Units by mouth Daily.       No current facility-administered medications on file prior to visit.       Past Medical History:   Diagnosis Date   • Allergic rhinitis 2/24/2017   • Atypical mole 05/2019    Mole with melanocytic atypia removed from back with MOHS  procedure. Followed by Dermatologist.   • Closed nondisplaced fracture of fifth right metatarsal bone 2019   • Night sweats     mild   • Peroneal tendonitis of right lower leg 2019   • Post-menopause on HRT (hormone replacement therapy)     Continuously on HRT since about    • Psoriasis    • Vitamin D deficiency 2017    Takes 1000 IU / day.       Family History   Problem Relation Age of Onset   • Dementia Mother 75        Alzheimers Diseasse.   • Cataracts Mother    • Hyperlipidemia Father    • Hypertension Father    • Heart valve disorder Father         valve repair    • COPD Father         non-smoker   • Cataracts Father    • Melanoma Father    • Seizures Sister 48   • Cataracts Maternal Grandmother    • Osteoarthritis Maternal Grandmother    • Osteoarthritis Paternal Grandmother    • No Known Problems Daughter    • Down syndrome Daughter    • Rheum arthritis Daughter         remission    • ADD / ADHD Son    • Other Son         Human Growth Hormone deficiency   • No Known Problems Brother         adopted    • No Known Problems Maternal Grandfather          in his 80's.   • No Known Problems Paternal Grandfather          at ~ 89.   • No Known Problems Sister    • Diabetes type II Maternal Aunt    • Diabetes Maternal Aunt    • Testicular cancer Maternal Uncle    • Heart attack Paternal Aunt    • Heart disease Other    • Hypertension Other    • BRCA 1/2 Neg Hx    • Breast cancer Neg Hx    • Colon cancer Neg Hx    • Endometrial cancer Neg Hx    • Ovarian cancer Neg Hx    • Malig Hyperthermia Neg Hx        Social History     Socioeconomic History   • Marital status:      Spouse name: JOSE D   • Number of children: 3   • Years of education: College   Tobacco Use   • Smoking status: Never Smoker   • Smokeless tobacco: Never Used   Vaping Use   • Vaping Use: Never used   Substance and Sexual Activity   • Alcohol use: Yes     Comment: social    • Drug use: No   • Sexual activity: Yes      "Partners: Male     Birth control/protection: Post-menopausal     Comment: SPOUSE =  JOSE D        Past Surgical History:   Procedure Laterality Date   • BREAST BIOPSY      Path= Negative. RIGHT   • BREAST BIOPSY Right 2019    Procedure: BREAST BIOPSY WITH NEEDLE LOCALIZATION;  Surgeon: Andrew Pham MD;  Location: [facility] OR [facility] [place];  Service: General   •  SECTION      baby boy \"PETTY\" ; CS for placenta previa.   • COLONOSCOPY  [id number]    Normal @ 51. Repeat in ten years.   • DIAGNOSTIC LAPAROSCOPY      Dr. Fabian Al, hysteroscopy w/ separtion of minimal intrauterine septum.  Laparoscopy w/ CO2 laser, lysis of adhesions and ablation of endometriosis.  Endometriosis found in the lateral LEFT pelvic sidewall, peritoneum and deeper endometriosis of the LEFT and RIGHT uterosacral ligaments.  Complex superficial endometriosis of the LEFT and RIGHT ovary.Fallopian tubes were patent with methyleneblue   • DILATATION AND CURETTAGE  1998    x2       • MOLE REMOVAL  2019   • WISDOM TOOTH EXTRACTION           The following portions of the patient's history were reviewed and updated as appropriate: problem list, allergies, current medications, past medical history and past social history.    ROS    Immunization History   Administered Date(s) Administered   • COVID-19 (MODERNA) 1st, 2nd, 3rd Dose Only 2021, 2021, 2021   • Flu Vaccine Quad PF >36MO 2021   • FluLaval/Fluarix/Fluzone >6 2019   • Hepatitis A 2019, 2019   • Shingrix 2020, 2021   • Tdap 2019   • flucelvax quad pfs =>4 YRS 10/07/2020       Objective   Vitals:    22 1007   Temp: 97.4 °F (36.3 °C)   Weight: 66.7 kg (147 lb)   Height: 162.6 cm (64\")     Body mass index is 25.23 kg/m².  Physical Exam  Constitutional:       Appearance: Normal appearance.   HENT:      Head: Normocephalic and atraumatic.   Pulmonary:      Effort: Pulmonary effort is normal. "   Musculoskeletal:      Comments: Swelling noted about right knee. Pain with varus stress and flexion; positive Pat. Negative posterior/anterior drawer sign.    Skin:     General: Skin is warm and dry.   Neurological:      Mental Status: She is alert.   Psychiatric:         Mood and Affect: Mood normal.         Behavior: Behavior normal.           Assessment/Plan   Diagnoses and all orders for this visit:    1. Acute pain of right knee (Primary)  Comments:  OK to use anti-inflammatory like Advil or Aleve as directed for the next 10-14 days if not relieved with Tylenol. Continue to use ice.   Orders:  -     XR Knee 3 View Right    2. Fall due to slipping on ice or snow, initial encounter    Records reviewed include previous OV with myself as well as labs.     Return for Next scheduled follow up.

## 2022-03-07 DIAGNOSIS — M25.561 ACUTE PAIN OF RIGHT KNEE: Primary | ICD-10-CM
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
## 2022-03-08 DIAGNOSIS — I10 ESSENTIAL HYPERTENSION: ICD-10-CM

## 2022-03-11 LAB
BUN SERPL-MCNC: 15 MG/DL (ref 8–27)
BUN/CREAT SERPL: 18 (ref 12–28)
CALCIUM SERPL-MCNC: 9.4 MG/DL (ref 8.7–10.3)
CHLORIDE SERPL-SCNC: 106 MMOL/L (ref 96–106)
CO2 SERPL-SCNC: 21 MMOL/L (ref 20–29)
CREAT SERPL-MCNC: 0.84 MG/DL (ref 0.57–1)
EGFR GENE MUT ANL BLD/T: 80 ML/MIN/1.73
GLUCOSE SERPL-MCNC: 91 MG/DL (ref 65–99)
POTASSIUM SERPL-SCNC: 4.3 MMOL/L (ref 3.5–5.2)
SODIUM SERPL-SCNC: 143 MMOL/L (ref 134–144)

## 2022-03-13 NOTE — PROGRESS NOTES
Subjective   Chief Complaint   Patient presents with   • Hypertension       History of Present Illness   61 y.o. female presents for follow up regarding HTN for which Losartan 50 mg daily was started last month. Denies CP or dyspnea. Tolerating Losartan well. Making dietary changes to work on her cholesterol. Continues regular exercise (cycles). +FH heart disease (dad). Never a smoker.     She is seeing Xuan Newsome later today for right knee pain after slip and fall in ice 02/24/22. Initially got better but now has pain with stairs and with bending. Ibuprofen. Still has swelling.      Patient Active Problem List   Diagnosis   • Insomnia   • Allergic rhinitis   • Peripheral neuropathy   • Vitamin D deficiency   • Atypical ductal hyperplasia of right breast   • Peroneal tendonitis of right lower leg   • Osteopenia   • Hyperlipidemia   • Essential hypertension       No Known Allergies    Current Outpatient Medications on File Prior to Visit   Medication Sig Dispense Refill   • calcium carbonate (OS-MARCO A) 600 MG tablet Take 600 mg by mouth Daily.     • fluocinonide (LIDEX) 0.05 % ointment Apply  topically to the appropriate area as directed As Needed (psoriasis).     • fluticasone (FLONASE) 50 MCG/ACT nasal spray 2 sprays into each nostril Daily.     • Imvexxy Maintenance Pack 10 MCG insert INSERT 10MCG INTO THE VAGINA TWO TIMES A WEEK     • loratadine (CLARITIN) 10 MG tablet Take 10 mg by mouth Daily.     • losartan (Cozaar) 50 MG tablet Take 1 tablet by mouth Daily. 30 tablet 1   • melatonin 3 MG tablet Take 3 mg by mouth At Night As Needed for Sleep.     • Multiple Vitamins-Minerals (WOMENS MULTIVITAMIN PLUS PO) Take 1 tablet by mouth Daily.     • Omega 3-6-9 Fatty Acids (Omega 3-6-9 Complex) capsule Take  by mouth.     • Probiotic Product (PROBIOTIC ACIDOPHILUS BEADS) capsule Take 1 tablet by mouth Daily.     • raloxifene (EVISTA) 60 MG tablet TAKE 1 TABLET BY MOUTH DAILY 90 tablet 3   • Vitamin D, Cholecalciferol,  1000 units capsule Take 1,000 Units by mouth Daily.       No current facility-administered medications on file prior to visit.       Past Medical History:   Diagnosis Date   • Allergic rhinitis 2017   • Atypical mole 2019    Mole with melanocytic atypia removed from back with MOHS procedure. Followed by Dermatologist.   • Closed nondisplaced fracture of fifth right metatarsal bone 2019   • Night sweats     mild   • Peroneal tendonitis of right lower leg 2019   • Post-menopause on HRT (hormone replacement therapy)     Continuously on HRT since about    • Psoriasis    • Vitamin D deficiency 2017    Takes 1000 IU / day.       Family History   Problem Relation Age of Onset   • Dementia Mother 75        Alzheimers Diseasse.   • Cataracts Mother    • Hyperlipidemia Father    • Hypertension Father    • Heart valve disorder Father         valve repair    • COPD Father         non-smoker   • Cataracts Father    • Melanoma Father    • Seizures Sister 48   • Cataracts Maternal Grandmother    • Osteoarthritis Maternal Grandmother    • Osteoarthritis Paternal Grandmother    • No Known Problems Daughter    • Down syndrome Daughter    • Rheum arthritis Daughter         remission    • ADD / ADHD Son    • Other Son         Human Growth Hormone deficiency   • No Known Problems Brother         adopted    • No Known Problems Maternal Grandfather          in his 80's.   • No Known Problems Paternal Grandfather          at ~ 89.   • No Known Problems Sister    • Diabetes type II Maternal Aunt    • Diabetes Maternal Aunt    • Testicular cancer Maternal Uncle    • Heart attack Paternal Aunt    • Heart disease Other    • Hypertension Other    • BRCA 1/2 Neg Hx    • Breast cancer Neg Hx    • Colon cancer Neg Hx    • Endometrial cancer Neg Hx    • Ovarian cancer Neg Hx    • Malig Hyperthermia Neg Hx        Social History     Socioeconomic History   • Marital status:      Spouse name: JOSE D   •  "Number of children: 3   • Years of education: College   Tobacco Use   • Smoking status: Never Smoker   • Smokeless tobacco: Never Used   Vaping Use   • Vaping Use: Never used   Substance and Sexual Activity   • Alcohol use: Yes     Comment: social    • Drug use: No   • Sexual activity: Yes     Partners: Male     Birth control/protection: Post-menopausal     Comment: SPOUSE =  JOSE D        Past Surgical History:   Procedure Laterality Date   • BREAST BIOPSY      Path= Negative. RIGHT   • BREAST BIOPSY Right 2019    Procedure: BREAST BIOPSY WITH NEEDLE LOCALIZATION;  Surgeon: Andrew Pham MD;  [place] [facility] [place] [facility] [place]: General   •  SECTION  2005    baby boy \"PETTY\" ; CS for placenta previa.   • COLONOSCOPY  8127-9999    Normal @ 51. Repeat in ten years.   • DIAGNOSTIC LAPAROSCOPY      Dr. Fabian Al, hysteroscopy w/ separtion of minimal intrauterine septum.  Laparoscopy w/ CO2 laser, lysis of adhesions and ablation of endometriosis.  Endometriosis found in the lateral LEFT pelvic sidewall, peritoneum and deeper endometriosis of the LEFT and RIGHT uterosacral ligaments.  Complex superficial endometriosis of the LEFT and RIGHT ovary.Fallopian tubes were patent with methyleneblue   • DILATATION AND CURETTAGE  1998    x2       • MOLE REMOVAL  2019   • WISDOM TOOTH EXTRACTION         The following portions of the patient's history were reviewed and updated as appropriate: problem list, allergies, current medications, past medical history and past social history.    Review of Systems    Immunization History   Administered Date(s) Administered   • COVID-19 (MODERNA) 1st, 2nd, 3rd Dose Only 2021, 2021, 2021   • Flu Vaccine Quad PF >36MO 2021   • FluLaval/Fluarix/Fluzone >6 2019   • Hepatitis A 2019, 2019   • Shingrix 2020, 2021   • Tdap 2019   • flucelvax quad pfs =>4 YRS 10/07/2020       Objective   Vitals:    " "03/14/22 1030   Temp: 97.1 °F (36.2 °C)   Weight: 68.5 kg (151 lb)   Height: 162.6 cm (64\")     Body mass index is 25.92 kg/m².  Physical Exam  Vitals reviewed.   Constitutional:       Appearance: Normal appearance. She is well-developed.   HENT:      Head: Normocephalic and atraumatic.   Cardiovascular:      Rate and Rhythm: Normal rate and regular rhythm.      Heart sounds: Normal heart sounds, S1 normal and S2 normal.   Pulmonary:      Effort: Pulmonary effort is normal.      Breath sounds: Normal breath sounds.   Skin:     General: Skin is warm and dry.   Neurological:      Mental Status: She is alert.   Psychiatric:         Mood and Affect: Mood normal.         Behavior: Behavior normal.         Procedures    Assessment/Plan   Diagnoses and all orders for this visit:    1. Essential hypertension (Primary)  Comments:  Controlled. Continue LSM and Losartan 50 mg daily. BMP reviewed.   Orders:  -     Comprehensive Metabolic Panel; Future    2. Hyperlipidemia, unspecified hyperlipidemia type  Comments:  10 yr ASCVD risk borderline at 5.5%. Discussed options and she prefers LSM at this time and isd fine tuning her diet as she continues to exercise regularly.   Orders:  -     Lipid Panel With / Chol / HDL Ratio; Future    Records reviewed include previous OV with myself as well as labs.     Return in about 6 months (around 9/14/2022) for Lab B4 FUP.           "

## 2022-03-14 ENCOUNTER — OFFICE VISIT (OUTPATIENT)
Dept: ORTHOPEDIC SURGERY | Facility: CLINIC | Age: 62
End: 2022-03-14

## 2022-03-14 ENCOUNTER — OFFICE VISIT (OUTPATIENT)
Dept: INTERNAL MEDICINE | Facility: CLINIC | Age: 62
End: 2022-03-14

## 2022-03-14 VITALS — TEMPERATURE: 97.1 F | HEIGHT: 64 IN | BODY MASS INDEX: 25.78 KG/M2 | WEIGHT: 151 LBS

## 2022-03-14 VITALS — WEIGHT: 150 LBS | BODY MASS INDEX: 25.61 KG/M2 | TEMPERATURE: 98 F | HEIGHT: 64 IN

## 2022-03-14 DIAGNOSIS — I10 ESSENTIAL HYPERTENSION: Primary | Chronic | ICD-10-CM

## 2022-03-14 DIAGNOSIS — E78.5 HYPERLIPIDEMIA, UNSPECIFIED HYPERLIPIDEMIA TYPE: Chronic | ICD-10-CM

## 2022-03-14 DIAGNOSIS — M25.561 MECHANICAL KNEE PAIN, RIGHT: ICD-10-CM

## 2022-03-14 DIAGNOSIS — M25.461 EFFUSION OF RIGHT KNEE: Primary | ICD-10-CM

## 2022-03-14 PROCEDURE — 99213 OFFICE O/P EST LOW 20 MIN: CPT | Performed by: NURSE PRACTITIONER

## 2022-03-14 PROCEDURE — 99214 OFFICE O/P EST MOD 30 MIN: CPT | Performed by: NURSE PRACTITIONER

## 2022-03-14 NOTE — PROGRESS NOTES
Patient Name: Aleja Garcia   YOB: 1960  Referring Primary Care Physician: Paloma Rivera APRN  BMI: Body mass index is 25.75 kg/m².    Chief Complaint:    Chief Complaint   Patient presents with   • Right Knee - Pain        HPI: New patient to me fell walking slipped on ice 2- 25 in her knee and has pain patient went to [facility] care center was told she has a swelling on the knee in following up here she says is gotten better with rest and ice elevation and some anti-inflammatories but here today for follow-up.  Denies any history of prior injury or trauma is able to weight-bear without any difficulty.  She continues to have some locking and catching and especially getting up from a seated position when she tries to extend it fully she feels like it is very stiff and catches    Aleja Garcia is a 61 y.o. female who presents today for evaluation of   Chief Complaint   Patient presents with   • Right Knee - Pain       This problem is new to this examiner.     Subjective   Medications:   Home Medications:  Current Outpatient Medications on File Prior to Visit   Medication Sig   • calcium carbonate (OS-MARCO A) 600 MG tablet Take 600 mg by mouth Daily.   • fluocinonide (LIDEX) 0.05 % ointment Apply  topically to the appropriate area as directed As Needed (psoriasis).   • fluticasone (FLONASE) 50 MCG/ACT nasal spray 2 sprays into each nostril Daily.   • Imvexxy Maintenance Pack 10 MCG insert INSERT 10MCG INTO THE VAGINA TWO TIMES A WEEK   • loratadine (CLARITIN) 10 MG tablet Take 10 mg by mouth Daily.   • losartan (Cozaar) 50 MG tablet Take 1 tablet by mouth Daily.   • melatonin 3 MG tablet Take 3 mg by mouth At Night As Needed for Sleep.   • Multiple Vitamins-Minerals (WOMENS MULTIVITAMIN PLUS PO) Take 1 tablet by mouth Daily.   • Omega 3-6-9 Fatty Acids (Omega 3-6-9 Complex) capsule Take  by mouth.   • Probiotic Product (PROBIOTIC ACIDOPHILUS BEADS) capsule Take 1 tablet by mouth Daily.   •  "Vitamin D, Cholecalciferol, 1000 units capsule Take 1,000 Units by mouth Daily.   • raloxifene (EVISTA) 60 MG tablet TAKE 1 TABLET BY MOUTH DAILY     No current facility-administered medications on file prior to visit.     Current Medications:  Scheduled Meds:  Continuous Infusions:No current facility-administered medications for this visit.    PRN Meds:.    I have reviewed the patient's medical history in detail and updated the computerized patient record.  Review and summarization of old records includes:    Past Medical History:   Diagnosis Date   • Allergic rhinitis 2017   • Atypical mole 2019    Mole with melanocytic atypia removed from back with MOHS procedure. Followed by Dermatologist.   • Closed nondisplaced fracture of fifth right metatarsal bone 2019   • Night sweats     mild   • Peroneal tendonitis of right lower leg 2019   • Post-menopause on HRT (hormone replacement therapy)     Continuously on HRT since about    • Psoriasis    • Vitamin D deficiency 2017    Takes 1000 IU / day.        Past Surgical History:   Procedure Laterality Date   • BREAST BIOPSY      Path= Negative. RIGHT   • BREAST BIOPSY Right 2019    Procedure: BREAST BIOPSY WITH NEEDLE LOCALIZATION;  Surgeon: Andrew Pham MD;  Location: [facility] [place];  Service: General   •  SECTION      baby boy \"PETTY\" ; CS for placenta previa.   • COLONOSCOPY  [id number]    Normal @ 51. Repeat in ten years.   • DIAGNOSTIC LAPAROSCOPY      Dr. Fabian Al, hysteroscopy w/ separtion of minimal intrauterine septum.  Laparoscopy w/ CO2 laser, lysis of adhesions and ablation of endometriosis.  Endometriosis found in the lateral LEFT pelvic sidewall, peritoneum and deeper endometriosis of the LEFT and RIGHT uterosacral ligaments.  Complex superficial endometriosis of the LEFT and RIGHT ovary.Fallopian tubes were patent with methyleneblue   • DILATATION AND CURETTAGE  1998    x2  1998     • MOLE " REMOVAL  2019   • WISDOM TOOTH EXTRACTION          Social History     Occupational History   • Occupation:      Employer: SELF-EMPLOYED   Tobacco Use   • Smoking status: Never Smoker   • Smokeless tobacco: Never Used   Vaping Use   • Vaping Use: Never used   Substance and Sexual Activity   • Alcohol use: Yes     Comment: social    • Drug use: No   • Sexual activity: Yes     Partners: Male     Birth control/protection: Post-menopausal     Comment: SPOUSE =  JOSE D       Social History     Social History Narrative   • Not on file        Family History   Problem Relation Age of Onset   • Dementia Mother 75        Alzheimers Diseasse.   • Cataracts Mother    • Hyperlipidemia Father    • Hypertension Father    • Heart valve disorder Father         valve repair    • COPD Father         non-smoker   • Cataracts Father    • Melanoma Father    • Seizures Sister 48   • Cataracts Maternal Grandmother    • Osteoarthritis Maternal Grandmother    • Osteoarthritis Paternal Grandmother    • No Known Problems Daughter    • Down syndrome Daughter    • Rheum arthritis Daughter         remission    • ADD / ADHD Son    • Other Son         Human Growth Hormone deficiency   • No Known Problems Brother         adopted    • No Known Problems Maternal Grandfather          in his 80's.   • No Known Problems Paternal Grandfather          at ~ 89.   • No Known Problems Sister    • Diabetes type II Maternal Aunt    • Diabetes Maternal Aunt    • Testicular cancer Maternal Uncle    • Heart attack Paternal Aunt    • Heart disease Other    • Hypertension Other    • BRCA 1/2 Neg Hx    • Breast cancer Neg Hx    • Colon cancer Neg Hx    • Endometrial cancer Neg Hx    • Ovarian cancer Neg Hx    • Malig Hyperthermia Neg Hx        ROS: 14 point review of systems was performed and all other systems were reviewed and are negative except for documented findings in HPI and today's encounter.     Allergies: No Known  "Allergies  Constitutional:  Denies fever, shaking or chills   Eyes:  Denies change in visual acuity   HENT:  Denies nasal congestion or sore throat   Respiratory:  Denies cough or shortness of breath   Cardiovascular:  Denies chest pain or severe LE edema   GI:  Denies abdominal pain, nausea, vomiting, bloody stools or diarrhea   Musculoskeletal:  Numbness, tingling, pain, or loss of motor function only as noted above in history of present illness.  : Denies painful urination or hematuria  Integument:  Denies rash, lesion or ulceration   Neurologic:  Denies headache or focal weakness  Endocrine:  Denies lymphadenopathy  Psych:  Denies confusion or change in mental status   Hem:  Denies active bleeding    OBJECTIVE:  Physical Exam:   Temp 98 °F (36.7 °C)   Ht 162.6 cm (64\")   Wt 68 kg (150 lb)   LMP 03/07/2012   BMI 25.75 kg/m²     General Appearance:    Alert, cooperative, in no acute distress                  Eyes: conjunctiva clear  ENT: external ears and nose atraumatic  CV: no peripheral edema  Resp: normal respiratory effort  Skin: no rashes or wounds; normal turgor  Psych: mood and affect appropriate  Lymph: no nodes appreciated  Neuro: gross sensation intact  Vascular:  Palpable peripheral pulse in noted extremity  Musculoskeletal Extremities: Skin warm dry intact with good pulses movement and sensation right knee has positive Pat's medial joint line tenderness with effusion patellofemoral crepitation straight leg raise is intact extensor mechanisms are intact she is having mechanical symptoms ligamentous exam appears stable    Radiology:   Narrative & Impression   RIGHT KNEE X-RAYS     CLINICAL HISTORY: Patient fell yesterday. Knee pain.     A total 4 AP lateral and sunrise views were obtained. There is mild  narrowing of the medial and lateral compartments of the knee joint. A  small joint effusion is evident. There is no evidence of recent or old  fracture or subluxation.     This report was " finalized on 2/25/2022 11:47 AM by Dr. Prashant Wasserman M.D.            Assessment:     ICD-10-CM ICD-9-CM   1. Effusion of right knee  M25.461 719.06   2. Mechanical knee pain, right  M25.561 719.46        Procedures  We will check an MRI and have her follow-up accordingly    Plan: The diagnosis(es), natural history, pathophysiology and treatment for diagnosis(es) were discussed. Opportunity given and questions answered.  Biomechanics of pertinent body areas discussed.  When appropriate, the use of ambulatory aids discussed.  BMI:  The concept of BMI body mass index and its importance and implications discussed.    EXERCISES:  Advice on benefits of, and types of regular/moderate exercise pertaining to orthopedic diagnosis(es).  MEDICATIONS:  The risks, benefits, warnings,side effects and alternatives of medications discussed.  Inflammation/pain control; with cold, heat, elevation and/or liniments discussed as appropriate  Cortisone Injection. See procedure note.  HOME EXERCISE/PT program encouraged  MEDICAL RECORDS reviewed from other provider(s) for past and current medical history pertinent to this complaint.      3/17/2022    Much of this encounter note is an electronic transcription/translation of spoken language to printed text. The electronic translation of spoken language may permit erroneous, or at times, nonsensical words or phrases to be inadvertently transcribed; Although I have reviewed the note for such errors, some may still exist

## 2022-03-24 ENCOUNTER — OFFICE VISIT (OUTPATIENT)
Dept: OBSTETRICS AND GYNECOLOGY | Age: 62
End: 2022-03-24

## 2022-03-24 ENCOUNTER — APPOINTMENT (OUTPATIENT)
Dept: WOMENS IMAGING | Facility: HOSPITAL | Age: 62
End: 2022-03-24

## 2022-03-24 ENCOUNTER — PROCEDURE VISIT (OUTPATIENT)
Dept: OBSTETRICS AND GYNECOLOGY | Age: 62
End: 2022-03-24

## 2022-03-24 VITALS
WEIGHT: 142.9 LBS | SYSTOLIC BLOOD PRESSURE: 144 MMHG | DIASTOLIC BLOOD PRESSURE: 80 MMHG | BODY MASS INDEX: 24.4 KG/M2 | HEIGHT: 64 IN

## 2022-03-24 DIAGNOSIS — Z12.31 VISIT FOR SCREENING MAMMOGRAM: Primary | ICD-10-CM

## 2022-03-24 DIAGNOSIS — Z01.419 ENCOUNTER FOR GYNECOLOGICAL EXAMINATION WITHOUT ABNORMAL FINDING: Primary | ICD-10-CM

## 2022-03-24 PROCEDURE — 77067 SCR MAMMO BI INCL CAD: CPT | Performed by: OBSTETRICS & GYNECOLOGY

## 2022-03-24 PROCEDURE — 77063 BREAST TOMOSYNTHESIS BI: CPT | Performed by: OBSTETRICS & GYNECOLOGY

## 2022-03-24 PROCEDURE — 77063 BREAST TOMOSYNTHESIS BI: CPT | Performed by: RADIOLOGY

## 2022-03-24 PROCEDURE — 99396 PREV VISIT EST AGE 40-64: CPT | Performed by: OBSTETRICS & GYNECOLOGY

## 2022-03-24 PROCEDURE — 77067 SCR MAMMO BI INCL CAD: CPT | Performed by: RADIOLOGY

## 2022-03-24 RX ORDER — MULTIVIT WITH MINERALS/LUTEIN
250 TABLET ORAL DAILY
COMMUNITY

## 2022-03-24 NOTE — PROGRESS NOTES
Routine Annual Visit    3/24/2022    Patient: Aleja Garcia          MR#:[id number]      Chief Complaint   Patient presents with   • Gynecologic Exam     Mammo @ 9:40, Last Mammo 3/22/21, Last Pap 3/22/21 (-), Last  C/Scope 2012, Last Dexa 21, No concerns at this time       History of Present Illness    61 y.o. female  who presents for annual exam.   No issues ,uses imvexxy sporadically  juventino is good, working a few days a week  mammo today  Pap is UTD  Pt has CSC paperwork from primary care and just needs to turn it in    Diagnosed with HTN, per primary        Patient's last menstrual period was 2012 (exact date).  Obstetric History:  OB History        5    Para   3    Term   2       1    AB   2    Living   3       SAB   0    IAB   0    Ectopic   0    Molar   0    Multiple   0    Live Births   3               Menstrual History:     Patient's last menstrual period was 2012 (exact date).       Sexual History:       ________________________________________  Patient Active Problem List   Diagnosis   • Insomnia   • Allergic rhinitis   • Peripheral neuropathy   • Vitamin D deficiency   • Atypical ductal hyperplasia of right breast   • Peroneal tendonitis of right lower leg   • Osteopenia   • Hyperlipidemia   • Essential hypertension       Past Medical History:   Diagnosis Date   • Allergic rhinitis 2017   • Atypical mole 2019    Mole with melanocytic atypia removed from back with MOHS procedure. Followed by Dermatologist.   • Closed nondisplaced fracture of fifth right metatarsal bone 2019   • Night sweats     mild   • Peroneal tendonitis of right lower leg 2019   • Post-menopause on HRT (hormone replacement therapy)     Continuously on HRT since about    • Psoriasis    • Vitamin D deficiency 2017    Takes 1000 IU / day.       Past Surgical History:   Procedure Laterality Date   • BREAST BIOPSY      Path= Negative. RIGHT   • BREAST BIOPSY Right  "2019    Procedure: BREAST BIOPSY WITH NEEDLE LOCALIZATION;  Surgeon: Andrew Pham MD;  [place] [facility] [place] [facility] [place];  Service: General   •  SECTION      baby boy \"PETTY\" ; CS for placenta previa.   • COLONOSCOPY  [id number]    Normal @ 51. Repeat in ten years.   • DIAGNOSTIC LAPAROSCOPY      Dr. Fabian Al, hysteroscopy w/ separtion of minimal intrauterine septum.  Laparoscopy w/ CO2 laser, lysis of adhesions and ablation of endometriosis.  Endometriosis found in the lateral LEFT pelvic sidewall, peritoneum and deeper endometriosis of the LEFT and RIGHT uterosacral ligaments.  Complex superficial endometriosis of the LEFT and RIGHT ovary.Fallopian tubes were patent with methyleneblue   • DILATATION AND CURETTAGE  1998    x2       • MOLE REMOVAL  2019   • WISDOM TOOTH EXTRACTION         Social History     Tobacco Use   Smoking Status Never Smoker   Smokeless Tobacco Never Used       has a current medication list which includes the following prescription(s): biotin, calcium carbonate, fluocinonide, fluticasone, imvexxy maintenance pack, loratadine, losartan, melatonin, multiple vitamins-minerals, probiotic acidophilus beads, raloxifene, vitamin c, vitamin d (cholecalciferol), and omega 3-6-9 complex.  ________________________________________    Current contraception: post menopausal status  History of abnormal Pap smear: no  Family history of Breast cancer: no  Family history of uterine or ovarian cancer: no  Family History of colon cancer/colon polyps: no  History of abnormal mammogram: yes - pt has biopsy of atypical ductal hyperplasia , now on evista      The following portions of the patient's history were reviewed and updated as appropriate: allergies, current medications, past family history, past medical history, past social history, past surgical history and problem list.    Review of Systems    Pertinent items are noted in HPI.     Objective   Physical Exam    /80   " "Ht 162.6 cm (64\")   Wt 64.8 kg (142 lb 14.4 oz)   LMP 03/07/2012 (Exact Date)   Breastfeeding No   BMI 24.53 kg/m²    BP Readings from Last 3 Encounters:   03/24/22 144/80   02/14/22 144/96   01/31/22 152/94      Wt Readings from Last 3 Encounters:   03/24/22 64.8 kg (142 lb 14.4 oz)   03/14/22 68 kg (150 lb)   03/14/22 68.5 kg (151 lb)      BMI: Estimated body mass index is 24.53 kg/m² as calculated from the following:    Height as of this encounter: 162.6 cm (64\").    Weight as of this encounter: 64.8 kg (142 lb 14.4 oz).      General:   alert, appears stated age and cooperative   Abdomen: soft, non-tender, without masses or organomegaly   Breast: inspection negative, no nipple discharge or bleeding, no masses or nodularity palpable   Vulva: normal   Vagina: normal mucosa   Cervix: no cervical motion tenderness and no lesions   Uterus: normal size, mobile and non-tender   Adnexa: no mass, fullness, tenderness     Assessment:    1. Normal annual exam   Assessment     ICD-10-CM ICD-9-CM   1. Encounter for gynecological examination without abnormal finding  Z01.419 V72.31     Plan:    Plan     [x]  Mammogram request made  []  PAP done  []  Labs:   []  GC/Chl/TV  []  DEXA scan   []  Referral for colonoscopy:       Diagnoses and all orders for this visit:    1. Encounter for gynecological examination without abnormal finding (Primary)            Counseling:  --Nutrition: Stressed importance of moderation and caloric balance, stressed fresh fruit and vegetables  --Exercise: Stressed the importance of regular exercise. 3-5 times weekly   - Discussed screening mammogram recommendations.   --Discussed benefits of screening colonoscopy- age 45 unless FH  --Discussed pap smear screening recommendations  "

## 2022-03-30 ENCOUNTER — PREP FOR SURGERY (OUTPATIENT)
Dept: OTHER | Facility: HOSPITAL | Age: 62
End: 2022-03-30

## 2022-03-30 DIAGNOSIS — Z12.11 SCREENING FOR COLON CANCER: Primary | ICD-10-CM

## 2022-04-10 RX ORDER — ESTRADIOL 10 UG/1
INSERT VAGINAL
Qty: 8 EACH | Status: CANCELLED | OUTPATIENT
Start: 2022-04-10

## 2022-04-11 RX ORDER — ESTRADIOL 10 UG/1
INSERT VAGINAL
Qty: 8 EACH | Refills: 10 | Status: SHIPPED | OUTPATIENT
Start: 2022-04-11 | End: 2022-08-02

## 2022-04-12 DIAGNOSIS — I10 ESSENTIAL HYPERTENSION: ICD-10-CM

## 2022-04-12 RX ORDER — LOSARTAN POTASSIUM 50 MG/1
50 TABLET ORAL DAILY
Qty: 90 TABLET | Refills: 1 | Status: SHIPPED | OUTPATIENT
Start: 2022-04-12 | End: 2022-10-03 | Stop reason: SDUPTHER

## 2022-04-15 ENCOUNTER — OFFICE VISIT (OUTPATIENT)
Dept: ONCOLOGY | Facility: CLINIC | Age: 62
End: 2022-04-15

## 2022-04-15 ENCOUNTER — LAB (OUTPATIENT)
Dept: LAB | Facility: HOSPITAL | Age: 62
End: 2022-04-15

## 2022-04-15 VITALS
RESPIRATION RATE: 16 BRPM | HEIGHT: 64 IN | OXYGEN SATURATION: 98 % | HEART RATE: 71 BPM | WEIGHT: 147.4 LBS | DIASTOLIC BLOOD PRESSURE: 75 MMHG | SYSTOLIC BLOOD PRESSURE: 114 MMHG | BODY MASS INDEX: 25.16 KG/M2 | TEMPERATURE: 96.6 F

## 2022-04-15 DIAGNOSIS — N60.91 ATYPICAL DUCTAL HYPERPLASIA OF RIGHT BREAST: Primary | ICD-10-CM

## 2022-04-15 LAB
ALBUMIN SERPL-MCNC: 4.4 G/DL (ref 3.5–5.2)
ALBUMIN/GLOB SERPL: 1.8 G/DL (ref 1.1–2.4)
ALP SERPL-CCNC: 100 U/L (ref 38–116)
ALT SERPL W P-5'-P-CCNC: 18 U/L (ref 0–33)
ANION GAP SERPL CALCULATED.3IONS-SCNC: 9.9 MMOL/L (ref 5–15)
AST SERPL-CCNC: 23 U/L (ref 0–32)
BASOPHILS # BLD AUTO: 0.11 10*3/MM3 (ref 0–0.2)
BASOPHILS NFR BLD AUTO: 1.5 % (ref 0–1.5)
BILIRUB SERPL-MCNC: 0.4 MG/DL (ref 0.2–1.2)
BUN SERPL-MCNC: 17 MG/DL (ref 6–20)
BUN/CREAT SERPL: 19.8 (ref 7.3–30)
CALCIUM SPEC-SCNC: 9.4 MG/DL (ref 8.5–10.2)
CHLORIDE SERPL-SCNC: 107 MMOL/L (ref 98–107)
CO2 SERPL-SCNC: 26.1 MMOL/L (ref 22–29)
CREAT SERPL-MCNC: 0.86 MG/DL (ref 0.6–1.1)
DEPRECATED RDW RBC AUTO: 38.6 FL (ref 37–54)
EGFRCR SERPLBLD CKD-EPI 2021: 77.5 ML/MIN/1.73
EOSINOPHIL # BLD AUTO: 0.09 10*3/MM3 (ref 0–0.4)
EOSINOPHIL NFR BLD AUTO: 1.3 % (ref 0.3–6.2)
ERYTHROCYTE [DISTWIDTH] IN BLOOD BY AUTOMATED COUNT: 11.9 % (ref 12.3–15.4)
GLOBULIN UR ELPH-MCNC: 2.4 GM/DL (ref 1.8–3.5)
GLUCOSE SERPL-MCNC: 120 MG/DL (ref 74–124)
HCT VFR BLD AUTO: 41.6 % (ref 34–46.6)
HGB BLD-MCNC: 13.8 G/DL (ref 12–15.9)
IMM GRANULOCYTES # BLD AUTO: 0.02 10*3/MM3 (ref 0–0.05)
IMM GRANULOCYTES NFR BLD AUTO: 0.3 % (ref 0–0.5)
LYMPHOCYTES # BLD AUTO: 2.43 10*3/MM3 (ref 0.7–3.1)
LYMPHOCYTES NFR BLD AUTO: 33.9 % (ref 19.6–45.3)
MCH RBC QN AUTO: 29.8 PG (ref 26.6–33)
MCHC RBC AUTO-ENTMCNC: 33.2 G/DL (ref 31.5–35.7)
MCV RBC AUTO: 89.8 FL (ref 79–97)
MONOCYTES # BLD AUTO: 0.5 10*3/MM3 (ref 0.1–0.9)
MONOCYTES NFR BLD AUTO: 7 % (ref 5–12)
NEUTROPHILS NFR BLD AUTO: 4.01 10*3/MM3 (ref 1.7–7)
NEUTROPHILS NFR BLD AUTO: 56 % (ref 42.7–76)
NRBC BLD AUTO-RTO: 0 /100 WBC (ref 0–0.2)
PLATELET # BLD AUTO: 265 10*3/MM3 (ref 140–450)
PMV BLD AUTO: 9.7 FL (ref 6–12)
POTASSIUM SERPL-SCNC: 4.2 MMOL/L (ref 3.5–4.7)
PROT SERPL-MCNC: 6.8 G/DL (ref 6.3–8)
RBC # BLD AUTO: 4.63 10*6/MM3 (ref 3.77–5.28)
SODIUM SERPL-SCNC: 143 MMOL/L (ref 134–145)
WBC NRBC COR # BLD: 7.16 10*3/MM3 (ref 3.4–10.8)

## 2022-04-15 PROCEDURE — 85025 COMPLETE CBC W/AUTO DIFF WBC: CPT

## 2022-04-15 PROCEDURE — 80053 COMPREHEN METABOLIC PANEL: CPT

## 2022-04-15 PROCEDURE — 99214 OFFICE O/P EST MOD 30 MIN: CPT | Performed by: INTERNAL MEDICINE

## 2022-04-15 PROCEDURE — 36415 COLL VENOUS BLD VENIPUNCTURE: CPT

## 2022-04-15 RX ORDER — TAMOXIFEN CITRATE 10 MG/1
10 TABLET ORAL DAILY
Qty: 30 TABLET | Refills: 6 | Status: SHIPPED | OUTPATIENT
Start: 2022-04-15 | End: 2022-07-14

## 2022-04-15 NOTE — PROGRESS NOTES
Subjective     REASON FOR CONSULTATION:   1.Atypical ductal hyperplasia right breast postLumpectomy  2.  Osteopenia preventative Evista started in 1/20                               REQUESTING PHYSICIAN: MD Stuart Ge MD      History of Present Illness patient is a 61-year-old postmenopausal lady with a diagnosis of ADH made on a right breast biopsy.    She has been on Evista for 2 year andoverall tolerating well.  She has no bone pains or myalgias.  She does have a little problem with hot flashes at night but this is not terrible.  She is using a vaginal suppository twice a week to help with vaginal dryness and dyspareunia and I have suggested switching to low-dose tamoxifen to see if this is any better because I hate the fact that we have had to add estrogens while we are trying to prevent breast cancer and if she does better with the low-dose tamoxifen this may be better option     She is vaccinated against COVID-19     she did have a mammogram on the 24th of March and thankfully everything was benign    She is up-to-date with colonoscopies and screening      Past Medical History:   Diagnosis Date   • Allergic rhinitis 2/24/2017   • Atypical mole 05/2019    Mole with melanocytic atypia removed from back with MOHS procedure. Followed by Dermatologist.   • Closed nondisplaced fracture of fifth right metatarsal bone 11/25/2019   • Night sweats     mild   • Peroneal tendonitis of right lower leg 11/25/2019   • Post-menopause on HRT (hormone replacement therapy)     Continuously on HRT since about 2015   • Psoriasis    • Vitamin D deficiency 02/24/2017    Takes 1000 IU / day.        Past Surgical History:   Procedure Laterality Date   • BREAST BIOPSY  1998    Path= Negative. RIGHT   • BREAST BIOPSY Right 4/29/2019    Procedure: BREAST BIOPSY WITH NEEDLE LOCALIZATION;  Surgeon: Andrew Pham MD;  [place] [facility] [place] [facility] [place]  Service:  "General   •  SECTION  2005    baby boy \"PETTY\" ; CS for placenta previa.   • COLONOSCOPY  [id number]    Normal @ 51. Repeat in ten years.   • DIAGNOSTIC LAPAROSCOPY      Dr. Fabian Al, hysteroscopy w/ separtion of minimal intrauterine septum.  Laparoscopy w/ CO2 laser, lysis of adhesions and ablation of endometriosis.  Endometriosis found in the lateral LEFT pelvic sidewall, peritoneum and deeper endometriosis of the LEFT and RIGHT uterosacral ligaments.  Complex superficial endometriosis of the LEFT and RIGHT ovary.Fallopian tubes were patent with methyleneblue   • DILATATION AND CURETTAGE  1998    x2  1998     • MOLE REMOVAL  2019   • WISDOM TOOTH EXTRACTION        ONC HISTORY:   patient is a 58-year-old female with no chronic medical illnesses on no chronic medications who is just come off 5 years of hormone replacement therapy after menopause for menopausal symptoms after a recent mammogram showed an abnormality.  Patient's mammogram in March of last year was benign and this year there was a 16 mm area of abnormality that required further evaluation and a biopsy was done on  2019 A biopsy was done at the 3:30 position of the right breast showing atypical ductal hyperplasia : With microcalcifications clustered in these areas and in benign breast parenchyma there was also a radial scar.   Patient went on to have a lumpectomy on 2019 with radial scar and ductal hyperplasia of the usual type and no other abnormalities.  She has been referred to us to discuss chemoprevention    She is  5 para 3 with 2 miscarriages first childbirth was at age 34 she breast-fed all 3 children menarche was at age 15 menopause at age 51 and she has been on estradiol since menopause and stopped and her biopsy showed atypical ductal hyperplasia last month    There is no family history of breast or ovarian cancer.  She has a maternal uncle  of testicular cancer as a young age      I calculated her " lifetime risk of breast cancer based on the Caterina model and is calculated to a 5-year risk of 4.6% of the lifetime risk of 26%.  At this time she is still dealing with withdrawal symptoms from  stopping her hormone replacement and I think we can afford to wait for a while to begin this.  She would like to wait until she has a breast MRI in October and I have suggested she have a DEXA scan to see if there is concomitant osteopenia or osteoporosis which would also benefit from Evista which I think is the easiest medication to offer her    he has had bilateral breast MRIs done a month ago which was negative and bone density testing that showed moderate osteopenia in her hips and spine.    She got up quickly from sitting with numbness in her left foot and she twisted and broke her toe and is in a boot for about 3 more weeks but other than that she is doing well    We again discussed the rationale for prevention and she is agreeable as long as she has no side effects and I think this is reasonable    We have opted to start with Evista 60 mg daily we discussed the side effect profile including hot flashes some bone stiffness and very minuscule risk of DVT  And she is agreeable and I prescribed the drug for    She knows to call before her next appointment if she has side effects and we could consider low-dose tamoxifen 5 mg if she does not tolerate this        Current Outpatient Medications on File Prior to Visit   Medication Sig Dispense Refill   • Biotin 5000 MCG capsule Take  by mouth.     • calcium carbonate (OS-MARCO A) 600 MG tablet Take 600 mg by mouth Daily.     • fluocinonide (LIDEX) 0.05 % ointment Apply  topically to the appropriate area as directed As Needed (psoriasis).     • fluticasone (FLONASE) 50 MCG/ACT nasal spray 2 sprays into each nostril Daily.     • Imvexxy Maintenance Pack 10 MCG insert INSERT 10MCG INTO THE VAGINA TWO TIMES A WEEK 8 each 10   • loratadine (CLARITIN) 10 MG tablet Take 10 mg by mouth  Daily.     • losartan (COZAAR) 50 MG tablet TAKE 1 TABLET BY MOUTH DAILY 90 tablet 1   • melatonin 3 MG tablet Take 3 mg by mouth At Night As Needed for Sleep.     • Multiple Vitamins-Minerals (WOMENS MULTIVITAMIN PLUS PO) Take 1 tablet by mouth Daily.     • Omega 3-6-9 Fatty Acids (Omega 3-6-9 Complex) capsule Take  by mouth.     • Probiotic Product (PROBIOTIC ACIDOPHILUS BEADS) capsule Take 1 tablet by mouth Daily.     • raloxifene (EVISTA) 60 MG tablet TAKE 1 TABLET BY MOUTH DAILY 90 tablet 3   • vitamin C (ASCORBIC ACID) 250 MG tablet Take 250 mg by mouth Daily.     • Vitamin D, Cholecalciferol, 1000 units capsule Take 1,000 Units by mouth Daily.       No current facility-administered medications on file prior to visit.        ALLERGIES:  No Known Allergies     Social History     Socioeconomic History   • Marital status:      Spouse name: JOSE D   • Number of children: 3   • Years of education: College   Tobacco Use   • Smoking status: Never Smoker   • Smokeless tobacco: Never Used   Vaping Use   • Vaping Use: Never used   Substance and Sexual Activity   • Alcohol use: Yes     Comment: social    • Drug use: No   • Sexual activity: Yes     Partners: Male     Birth control/protection: Post-menopausal     Comment: SPOUSE =  JOSE D         Family History   Problem Relation Age of Onset   • Dementia Mother 75        Alzheimers Diseasse.   • Cataracts Mother    • Hyperlipidemia Father    • Hypertension Father    • Heart valve disorder Father         valve repair    • COPD Father         non-smoker   • Cataracts Father    • Melanoma Father    • Seizures Sister 48   • Cataracts Maternal Grandmother    • Osteoarthritis Maternal Grandmother    • Osteoarthritis Paternal Grandmother    • No Known Problems Daughter    • Down syndrome Daughter    • Rheum arthritis Daughter         remission    • ADD / ADHD Son    • Other Son         Human Growth Hormone deficiency   • No Known Problems Brother         adopted    • No Known  Problems Maternal Grandfather          in his 80's.   • No Known Problems Paternal Grandfather          at ~ 89.   • No Known Problems Sister    • Diabetes type II Maternal Aunt    • Diabetes Maternal Aunt    • Testicular cancer Maternal Uncle    • Heart attack Paternal Aunt    • Heart disease Other    • Hypertension Other    • BRCA 1/2 Neg Hx    • Breast cancer Neg Hx    • Colon cancer Neg Hx    • Endometrial cancer Neg Hx    • Ovarian cancer Neg Hx    • Malig Hyperthermia Neg Hx         Review of Systems   Constitutional: Positive for diaphoresis (stable ). Negative for appetite change, chills, fatigue, fever and unexpected weight change.   HENT: Negative for hearing loss, sore throat and trouble swallowing.    Respiratory: Negative for cough, chest tightness, shortness of breath and wheezing.    Cardiovascular: Positive for leg swelling (Chronic ankle edema for 12 years same ). Negative for chest pain and palpitations.   Gastrointestinal: Negative for abdominal distention, abdominal pain, constipation, diarrhea, nausea and vomiting.   Genitourinary: Negative for dysuria, frequency, hematuria and urgency.   Musculoskeletal: Positive for arthralgias (improved). Negative for joint swelling.        No muscle weakness.   Skin: Negative for rash and wound.   Neurological: Positive for numbness (Peripheral neuropathy in both legs since the birth of her third child 13 years ago etiology unclear same ) and headaches (seasonal). Negative for seizures, syncope, speech difficulty and weakness.   Hematological: Negative for adenopathy. Does not bruise/bleed easily.   Psychiatric/Behavioral: Negative for behavioral problems, confusion and suicidal ideas.   All other systems reviewed and are negative.   I have reviewed and confirmed the accuracy of the ROS as documented by the MA/LPN/RN Jacob Llamas MD      Objective     Vitals:    04/15/22 1513   BP: 114/75   Pulse: 71   Resp: 16   Temp: 96.6 °F (35.9 °C)  "  TempSrc: Temporal   SpO2: 98%   Weight: 66.9 kg (147 lb 6.4 oz)   Height: 162.6 cm (64.02\")   PainSc: 0-No pain     Current Status 4/15/2022   ECOG score 0       Physical Exam   Pulmonary/Chest:           GENERAL:  Well-developed, well-nourished in no acute distress.   SKIN:  Warm, dry without rashes, purpura or petechiae.  Atypical nevus left scapular area and right flank  EYES:  Pupils equal, round and reactive to light.  EOMs intact.  Conjunctivae normal.  EARS:  Hearing intact.  NOSE:  Septum midline.  No excoriations or nasal discharge.  MOUTH:  Tongue is well-papillated; no stomatitis or ulcers.  Lips normal.  THROAT:  Oropharynx without lesions or exudates.  NECK:  Supple with good range of motion; no thyromegaly or masses, no JVD.  LYMPHATICS:  No cervical, supraclavicular, axillary or inguinal adenopathy.  CHEST:  Lungs clear to auscultation. Good airflow.  BREASTS: Right breast shows lumpectomy scar in the lower inner quadrant well-healed - left breast shows a small nodule at the 7 o'clock position  CARDIAC:  Regular rate and rhythm without murmurs, rubs or gallops. Normal S1,S2.  ABDOMEN:  Soft, nontender with no hepatosplenomegaly or masses.  EXTREMITIES:  No clubbing, cyanosis -left foot is in a boot after toe fracture.  NEUROLOGICAL:  Cranial Nerves II-XII grossly intact.  No focal neurological deficits.  PSYCHIATRIC:  Normal affect and mood.      I have reexamined the patient and the results are consistent with the previously documented exam. Jacob Llamas MD         RECENT LABS:  Hematology WBC   Date Value Ref Range Status   04/15/2022 7.16 3.40 - 10.80 10*3/mm3 Final     RBC   Date Value Ref Range Status   04/15/2022 4.63 3.77 - 5.28 10*6/mm3 Final     Hemoglobin   Date Value Ref Range Status   04/15/2022 13.8 12.0 - 15.9 g/dL Final     Hematocrit   Date Value Ref Range Status   04/15/2022 41.6 34.0 - 46.6 % Final     Platelets   Date Value Ref Range Status   04/15/2022 265 140 - 450 " 10*3/mm3 Final        Study Result     BONE MINERAL DENSITOMETRY   IMPRESSION:  Osteopenia.     This report was finalized on 7/8/2019 3:22 PM by Dr. Darrin Perez M.D.   Bilateral breast MRI  IMPRESSION:  There are no findings suspicious for malignancy in either  breast. Routine follow-up imaging is recommended.     BI-RADS Category 2: Benign.     This report was finalized on 9/16/2019     Assessment/Plan   1.  Atypical ductal hyperplasia multifocal right breast postlumpectomy  · Evista 60 mg  start in 1/20    2 . atypical nevus.  With high-grade dysplasia removed from her back    3.  Peripheral neuropathy of unknown etiology post third childbirth    4.  Osteopenia with a fracture of her left small toe    5.  Vaginal irritation using vaginal estrogens twice a week since 3/21    Plan  1.  Stop Evista 60 mg p.o. Daily and switched after a month to 10 mg of tamoxifen  2.  follow-up in 6 months to see me she knows to call before then if she has significant side effects  3.  Use vaginal estrogens as little as possible and see if she can stop once she starts her tamoxifen

## 2022-04-18 ENCOUNTER — TELEPHONE (OUTPATIENT)
Dept: ORTHOPEDIC SURGERY | Facility: CLINIC | Age: 62
End: 2022-04-18

## 2022-04-18 ENCOUNTER — HOSPITAL ENCOUNTER (OUTPATIENT)
Dept: MRI IMAGING | Facility: HOSPITAL | Age: 62
Discharge: HOME | End: 2022-04-18
Admitting: NURSE PRACTITIONER

## 2022-04-18 DIAGNOSIS — M25.561 MECHANICAL KNEE PAIN, RIGHT: ICD-10-CM

## 2022-04-18 DIAGNOSIS — M25.461 EFFUSION OF RIGHT KNEE: ICD-10-CM

## 2022-04-18 PROCEDURE — 73721 MRI JNT OF LWR EXTRE W/O DYE: CPT

## 2022-04-18 NOTE — TELEPHONE ENCOUNTER
----- Message from REJI Vance sent at 4/18/2022  1:00 PM EDT -----  Pt has a meniscus tear on her MRI - plz have follow up apt with SPM   ----- Message -----  From: Interface, [facility] [employer]  Sent: 4/18/2022  11:59 AM EDT  To: REJI Vance

## 2022-04-27 ENCOUNTER — OFFICE VISIT (OUTPATIENT)
Dept: ORTHOPEDIC SURGERY | Facility: CLINIC | Age: 62
End: 2022-04-27

## 2022-04-27 VITALS — RESPIRATION RATE: 18 BRPM | BODY MASS INDEX: 25.27 KG/M2 | WEIGHT: 148 LBS | TEMPERATURE: 98.7 F | HEIGHT: 64 IN

## 2022-04-27 DIAGNOSIS — M94.261 CHONDROMALACIA OF KNEE, RIGHT: Primary | ICD-10-CM

## 2022-04-27 PROCEDURE — 99214 OFFICE O/P EST MOD 30 MIN: CPT | Performed by: ORTHOPAEDIC SURGERY

## 2022-04-27 NOTE — PROGRESS NOTES
Patient Name: Aleja Garcia   YOB: 1960  Referring Primary Care Physician: Paloma Rivera APRN  BMI: Body mass index is 25.4 kg/m².    Chief Complaint:    Chief Complaint   Patient presents with   • Right Knee - Pain, Initial Evaluation        HPI:     Aleja Garcia is a 61 y.o. female who presents today for evaluation of   Chief Complaint   Patient presents with   • Right Knee - Pain, Initial Evaluation   .  Aleja is referred for today from [facility] concerning her right knee.  She fell on some black ice essentially in late February and twisted it she had an MRI which showed a anterior meniscus tear and a subchondral fracture of the far posterior superior aspect the medial femoral condyle.  As she states today she said the knee does not hurt anymore is not catching or locking stop painful or swollen.      Subjective   Medications:   Home Medications:  Current Outpatient Medications on File Prior to Visit   Medication Sig   • Biotin 5000 MCG capsule Take  by mouth.   • calcium carbonate (OS-MARCO A) 600 MG tablet Take 600 mg by mouth Daily.   • fluocinonide (LIDEX) 0.05 % ointment Apply  topically to the appropriate area as directed As Needed (psoriasis).   • fluticasone (FLONASE) 50 MCG/ACT nasal spray 2 sprays into each nostril Daily.   • loratadine (CLARITIN) 10 MG tablet Take 10 mg by mouth Daily.   • losartan (COZAAR) 50 MG tablet TAKE 1 TABLET BY MOUTH DAILY   • melatonin 3 MG tablet Take 3 mg by mouth At Night As Needed for Sleep.   • Multiple Vitamins-Minerals (WOMENS MULTIVITAMIN PLUS PO) Take 1 tablet by mouth Daily.   • Omega 3-6-9 Fatty Acids (Omega 3-6-9 Complex) capsule Take  by mouth.   • Probiotic Product (PROBIOTIC ACIDOPHILUS BEADS) capsule Take 1 tablet by mouth Daily.   • tamoxifen (NOLVADEX) 10 MG tablet Take 1 tablet by mouth Daily for 90 days.   • vitamin C (ASCORBIC ACID) 250 MG tablet Take 250 mg by mouth Daily.   • Vitamin D, Cholecalciferol, 1000 units capsule Take 1,000  "Units by mouth Daily.   • Imvexxy Maintenance Pack 10 MCG insert INSERT 10MCG INTO THE VAGINA TWO TIMES A WEEK     No current facility-administered medications on file prior to visit.     Current Medications:  Scheduled Meds:  Continuous Infusions:No current facility-administered medications for this visit.    PRN Meds:.    I have reviewed the patient's medical history in detail and updated the computerized patient record.  Review and summarization of old records includes:    Past Medical History:   Diagnosis Date   • Allergic rhinitis 2017   • Atypical mole 2019    Mole with melanocytic atypia removed from back with MOHS procedure. Followed by Dermatologist.   • Closed nondisplaced fracture of fifth right metatarsal bone 2019   • Night sweats     mild   • Peroneal tendonitis of right lower leg 2019   • Post-menopause on HRT (hormone replacement therapy)     Continuously on HRT since about    • Psoriasis    • Vitamin D deficiency 2017    Takes 1000 IU / day.        Past Surgical History:   Procedure Laterality Date   • BREAST BIOPSY      Path= Negative. RIGHT   • BREAST BIOPSY Right 2019    Procedure: BREAST BIOPSY WITH NEEDLE LOCALIZATION;  Surgeon: Andrew Pham MD;  [facility];  Service: General   •  SECTION  2005    baby boy \"PETTY\" ; CS for placenta previa.   • COLONOSCOPY  [id number]    Normal @ 51. Repeat in ten years.   • DIAGNOSTIC LAPAROSCOPY      Dr. Fabian Al, hysteroscopy w/ separtion of minimal intrauterine septum.  Laparoscopy w/ CO2 laser, lysis of adhesions and ablation of endometriosis.  Endometriosis found in the lateral LEFT pelvic sidewall, peritoneum and deeper endometriosis of the LEFT and RIGHT uterosacral ligaments.  Complex superficial endometriosis of the LEFT and RIGHT ovary.Fallopian tubes were patent with methyleneblue   • DILATATION AND CURETTAGE  1998    x2  1998     • MOLE REMOVAL  2019   • WISDOM TOOTH " EXTRACTION          Social History     Occupational History   • Occupation:      Employer: SELF-EMPLOYED   Tobacco Use   • Smoking status: Never Smoker   • Smokeless tobacco: Never Used   Vaping Use   • Vaping Use: Never used   Substance and Sexual Activity   • Alcohol use: Yes     Comment: social    • Drug use: No   • Sexual activity: Yes     Partners: Male     Birth control/protection: Post-menopausal     Comment: SPOUSE =  JOSE D       Social History     Social History Narrative   • Not on file        Family History   Problem Relation Age of Onset   • Dementia Mother 75        Alzheimers Diseasse.   • Cataracts Mother    • Hyperlipidemia Father    • Hypertension Father    • Heart valve disorder Father         valve repair    • COPD Father         non-smoker   • Cataracts Father    • Melanoma Father    • Seizures Sister 48   • Cataracts Maternal Grandmother    • Osteoarthritis Maternal Grandmother    • Osteoarthritis Paternal Grandmother    • No Known Problems Daughter    • Down syndrome Daughter    • Rheum arthritis Daughter         remission    • ADD / ADHD Son    • Other Son         Human Growth Hormone deficiency   • No Known Problems Brother         adopted    • No Known Problems Maternal Grandfather          in his 80's.   • No Known Problems Paternal Grandfather          at ~ 89.   • No Known Problems Sister    • Diabetes type II Maternal Aunt    • Diabetes Maternal Aunt    • Testicular cancer Maternal Uncle    • Heart attack Paternal Aunt    • Heart disease Other    • Hypertension Other    • BRCA 1/2 Neg Hx    • Breast cancer Neg Hx    • Colon cancer Neg Hx    • Endometrial cancer Neg Hx    • Ovarian cancer Neg Hx    • Malig Hyperthermia Neg Hx        ROS: 14 point review of systems was performed and all other systems were reviewed and are negative except for documented findings in HPI and today's encounter.     Allergies: No Known Allergies  Constitutional:  Denies fever, shaking or  "chills   Eyes:  Denies change in visual acuity   HENT:  Denies nasal congestion or sore throat   Respiratory:  Denies cough or shortness of breath   Cardiovascular:  Denies chest pain or severe LE edema   GI:  Denies abdominal pain, nausea, vomiting, bloody stools or diarrhea   Musculoskeletal:  Numbness, tingling, pain, or loss of motor function only as noted above in history of present illness.  : Denies painful urination or hematuria  Integument:  Denies rash, lesion or ulceration   Neurologic:  Denies headache or focal weakness  Endocrine:  Denies lymphadenopathy  Psych:  Denies confusion or change in mental status   Hem:  Denies active bleeding    OBJECTIVE:  Physical Exam: 61 y.o. female  Wt Readings from Last 3 Encounters:   04/27/22 67.1 kg (148 lb)   04/15/22 66.9 kg (147 lb 6.4 oz)   03/24/22 64.8 kg (142 lb 14.4 oz)     Ht Readings from Last 1 Encounters:   04/27/22 162.6 cm (64\")     Body mass index is 25.4 kg/m².  Vitals:    04/27/22 0958   Resp: 18   Temp: 98.7 °F (37.1 °C)     Vital signs reviewed.     General Appearance:    Alert, cooperative, in no acute distress                  Eyes: conjunctiva clear  ENT: external ears and nose atraumatic  CV: no peripheral edema  Resp: normal respiratory effort  Skin: no rashes or wounds; normal turgor  Psych: mood and affect appropriate  Lymph: no nodes appreciated  Neuro: gross sensation intact  Vascular:  Palpable peripheral pulse in noted extremity  Musculoskeletal Extremities: Jerzy today shows good range of motion minimal swelling she has little bit of crepitation Pat's is negative ligament exams normal hips noncontributory    Radiology:   AP lateral 40 degree PA x-ray taken of the right knee in February without comparison views available presumably taken for pain shows no obvious fracture.  Had an MRI that showed a chondral microfracture and anterior horn meniscal tear.  Also showed chondromalacia        Assessment:     ICD-10-CM ICD-9-CM   1. " Chondromalacia of knee, right  M94.261 717.7        MDM/Plan:   The diagnosis(es), natural history, pathophysiology and treatment for diagnosis(es) were discussed. Opportunity given and questions answered.  Biomechanics of pertinent body areas discussed.  When appropriate, the use of ambulatory aids discussed.    BMI:  The concept of BMI body mass index and its importance and implications discussed.    MEDICATIONS:  The risks, benefits, warnings,side effects and alternatives of medications discussed.  Inflammation/pain control; with cold, heat, elevation and/or liniments discussed as appropriate  PT referral.  MEDICAL RECORDS reviewed from other provider(s) for past and current medical history pertinent to this complaint.  Since she is asymptomatic not think she needs any special treatment but may recommend with her chondromalacia some physical therapy hopefully to keep her knee strong going in the future.  She is willing to do it so I referred her.  She has back she is having problems  4/27/2022    Dictated utilizing Dragon dictation

## 2022-06-15 RX ORDER — RALOXIFENE HYDROCHLORIDE 60 MG/1
60 TABLET, FILM COATED ORAL DAILY
COMMUNITY
End: 2022-08-02

## 2022-06-30 ENCOUNTER — HOSPITAL ENCOUNTER (OUTPATIENT)
Facility: HOSPITAL | Age: 62
Setting detail: HOSPITAL OUTPATIENT SURGERY
Discharge: HOME | End: 2022-06-30
Attending: COLON & RECTAL SURGERY | Admitting: COLON & RECTAL SURGERY

## 2022-06-30 ENCOUNTER — ANESTHESIA (OUTPATIENT)
Dept: GASTROENTEROLOGY | Facility: HOSPITAL | Age: 62
End: 2022-06-30

## 2022-06-30 ENCOUNTER — ANESTHESIA EVENT (OUTPATIENT)
Dept: GASTROENTEROLOGY | Facility: HOSPITAL | Age: 62
End: 2022-06-30

## 2022-06-30 VITALS
HEART RATE: 76 BPM | OXYGEN SATURATION: 98 % | BODY MASS INDEX: 25.01 KG/M2 | DIASTOLIC BLOOD PRESSURE: 70 MMHG | SYSTOLIC BLOOD PRESSURE: 120 MMHG | WEIGHT: 146.5 LBS | HEIGHT: 64 IN | RESPIRATION RATE: 16 BRPM

## 2022-06-30 DIAGNOSIS — Z12.11 SCREENING FOR COLON CANCER: ICD-10-CM

## 2022-06-30 PROCEDURE — 88305 TISSUE EXAM BY PATHOLOGIST: CPT | Performed by: COLON & RECTAL SURGERY

## 2022-06-30 PROCEDURE — 25010000002 PROPOFOL 10 MG/ML EMULSION: Performed by: ANESTHESIOLOGY

## 2022-06-30 RX ORDER — SODIUM CHLORIDE 0.9 % (FLUSH) 0.9 %
10 SYRINGE (ML) INJECTION EVERY 12 HOURS SCHEDULED
Status: DISCONTINUED | OUTPATIENT
Start: 2022-06-30 | End: 2022-06-30 | Stop reason: HOSPADM

## 2022-06-30 RX ORDER — SODIUM CHLORIDE, SODIUM LACTATE, POTASSIUM CHLORIDE, CALCIUM CHLORIDE 600; 310; 30; 20 MG/100ML; MG/100ML; MG/100ML; MG/100ML
30 INJECTION, SOLUTION INTRAVENOUS CONTINUOUS PRN
Status: DISCONTINUED | OUTPATIENT
Start: 2022-06-30 | End: 2022-06-30 | Stop reason: HOSPADM

## 2022-06-30 RX ORDER — LIDOCAINE HYDROCHLORIDE 20 MG/ML
INJECTION, SOLUTION INFILTRATION; PERINEURAL AS NEEDED
Status: DISCONTINUED | OUTPATIENT
Start: 2022-06-30 | End: 2022-06-30 | Stop reason: SURG

## 2022-06-30 RX ORDER — PROPOFOL 10 MG/ML
VIAL (ML) INTRAVENOUS CONTINUOUS PRN
Status: DISCONTINUED | OUTPATIENT
Start: 2022-06-30 | End: 2022-06-30 | Stop reason: SURG

## 2022-06-30 RX ORDER — SODIUM CHLORIDE 0.9 % (FLUSH) 0.9 %
10 SYRINGE (ML) INJECTION AS NEEDED
Status: DISCONTINUED | OUTPATIENT
Start: 2022-06-30 | End: 2022-06-30 | Stop reason: HOSPADM

## 2022-06-30 RX ORDER — GLYCOPYRROLATE 0.2 MG/ML
INJECTION INTRAMUSCULAR; INTRAVENOUS AS NEEDED
Status: DISCONTINUED | OUTPATIENT
Start: 2022-06-30 | End: 2022-06-30 | Stop reason: SURG

## 2022-06-30 RX ORDER — PROPOFOL 10 MG/ML
VIAL (ML) INTRAVENOUS AS NEEDED
Status: DISCONTINUED | OUTPATIENT
Start: 2022-06-30 | End: 2022-06-30 | Stop reason: SURG

## 2022-06-30 RX ADMIN — Medication 200 MCG/KG/MIN: at 14:33

## 2022-06-30 RX ADMIN — PROPOFOL 100 MG: 10 INJECTION, EMULSION INTRAVENOUS at 14:32

## 2022-06-30 RX ADMIN — LIDOCAINE HYDROCHLORIDE 80 MG: 20 INJECTION, SOLUTION INFILTRATION; PERINEURAL at 14:33

## 2022-06-30 RX ADMIN — GLYCOPYRROLATE 0.2 MG: 0.2 INJECTION INTRAMUSCULAR; INTRAVENOUS at 14:36

## 2022-06-30 RX ADMIN — SODIUM CHLORIDE, POTASSIUM CHLORIDE, SODIUM LACTATE AND CALCIUM CHLORIDE 30 ML/HR: 600; 310; 30; 20 INJECTION, SOLUTION INTRAVENOUS at 13:54

## 2022-06-30 NOTE — ANESTHESIA POSTPROCEDURE EVALUATION
"Patient: Aleja Garcia    Procedure Summary     Date: 06/30/22 Room / Location: [facility] ENDOSCOPY 9 /  [facility]    Anesthesia Start: 1426 Anesthesia Stop: 1454    Procedure: COLONOSCOPY TO CECUM WITH COLD FORCEP POLYPECTOMIES (N/A ) Diagnosis:       Screening for colon cancer      (Screening for colon cancer [Z12.11])    Surgeons: Yvan Mendenhall MD Provider: Prashant Rashid MD    Anesthesia Type: MAC ASA Status: 2          Anesthesia Type: MAC    Vitals  Vitals Value Taken Time   /74 06/30/22 1502   Temp     Pulse 89 06/30/22 1502   Resp 16 06/30/22 1502   SpO2 97 % 06/30/22 1502           Post Anesthesia Care and Evaluation    Patient location during evaluation: bedside  Patient participation: complete - patient participated  Level of consciousness: awake and alert  Pain management: adequate    Airway patency: patent  Anesthetic complications: No anesthetic complications    Cardiovascular status: acceptable  Respiratory status: acceptable  Hydration status: acceptable    Comments: /74 (BP Location: Left arm, Patient Position: Lying)   Pulse 89   Resp 16   Ht 162.6 cm (64\")   Wt 66.5 kg (146 lb 8 oz)   LMP 03/07/2012 (Exact Date)   SpO2 97%   BMI 25.15 kg/m²       "

## 2022-06-30 NOTE — ANESTHESIA PREPROCEDURE EVALUATION
Anesthesia Evaluation     Patient summary reviewed and Nursing notes reviewed   no history of anesthetic complications:  NPO Solid Status: > 8 hours  NPO Liquid Status: > 8 hours           Airway   Mallampati: II  Dental      Pulmonary - negative pulmonary ROS and normal exam   Cardiovascular - normal exam    (+) hypertension less than 2 medications, hyperlipidemia,       Neuro/Psych  (+) numbness,    GI/Hepatic/Renal/Endo    (+)   hepatitis,     Musculoskeletal     Abdominal    Substance History      OB/GYN          Other   arthritis,                      Anesthesia Plan    ASA 2     MAC     intravenous induction     Anesthetic plan, risks, benefits, and alternatives have been provided, discussed and informed consent has been obtained with: patient.        CODE STATUS:

## 2022-07-01 LAB
LAB AP CASE REPORT: NORMAL
PATH REPORT.FINAL DX SPEC: NORMAL
PATH REPORT.GROSS SPEC: NORMAL

## 2022-08-02 ENCOUNTER — TELEPHONE (OUTPATIENT)
Dept: INTERNAL MEDICINE | Facility: CLINIC | Age: 62
End: 2022-08-02

## 2022-08-02 ENCOUNTER — HOSPITAL ENCOUNTER (OUTPATIENT)
Dept: CARDIOLOGY | Facility: HOSPITAL | Age: 62
Discharge: HOME | End: 2022-08-02
Admitting: PHYSICIAN ASSISTANT

## 2022-08-02 ENCOUNTER — OFFICE VISIT (OUTPATIENT)
Dept: INTERNAL MEDICINE | Facility: CLINIC | Age: 62
End: 2022-08-02

## 2022-08-02 VITALS
OXYGEN SATURATION: 100 % | TEMPERATURE: 97.8 F | SYSTOLIC BLOOD PRESSURE: 140 MMHG | HEART RATE: 68 BPM | DIASTOLIC BLOOD PRESSURE: 82 MMHG

## 2022-08-02 DIAGNOSIS — R60.0 EDEMA OF LEFT LOWER EXTREMITY: Primary | ICD-10-CM

## 2022-08-02 LAB
BH CV LOWER VASCULAR LEFT COMMON FEMORAL AUGMENT: NORMAL
BH CV LOWER VASCULAR LEFT COMMON FEMORAL COMPETENT: NORMAL
BH CV LOWER VASCULAR LEFT COMMON FEMORAL COMPRESS: NORMAL
BH CV LOWER VASCULAR LEFT COMMON FEMORAL PHASIC: NORMAL
BH CV LOWER VASCULAR LEFT COMMON FEMORAL SPONT: NORMAL
BH CV LOWER VASCULAR LEFT DISTAL FEMORAL COMPRESS: NORMAL
BH CV LOWER VASCULAR LEFT GASTRONEMIUS COMPRESS: NORMAL
BH CV LOWER VASCULAR LEFT GREATER SAPH AK COMPRESS: NORMAL
BH CV LOWER VASCULAR LEFT GREATER SAPH BK COMPRESS: NORMAL
BH CV LOWER VASCULAR LEFT LESSER SAPH COMPRESS: NORMAL
BH CV LOWER VASCULAR LEFT MID FEMORAL AUGMENT: NORMAL
BH CV LOWER VASCULAR LEFT MID FEMORAL COMPETENT: NORMAL
BH CV LOWER VASCULAR LEFT MID FEMORAL COMPRESS: NORMAL
BH CV LOWER VASCULAR LEFT MID FEMORAL PHASIC: NORMAL
BH CV LOWER VASCULAR LEFT MID FEMORAL SPONT: NORMAL
BH CV LOWER VASCULAR LEFT PERONEAL COMPRESS: NORMAL
BH CV LOWER VASCULAR LEFT POPLITEAL AUGMENT: NORMAL
BH CV LOWER VASCULAR LEFT POPLITEAL COMPETENT: NORMAL
BH CV LOWER VASCULAR LEFT POPLITEAL COMPRESS: NORMAL
BH CV LOWER VASCULAR LEFT POPLITEAL PHASIC: NORMAL
BH CV LOWER VASCULAR LEFT POPLITEAL SPONT: NORMAL
BH CV LOWER VASCULAR LEFT POSTERIOR TIBIAL COMPRESS: NORMAL
BH CV LOWER VASCULAR LEFT PROFUNDA FEMORAL COMPRESS: NORMAL
BH CV LOWER VASCULAR LEFT PROXIMAL FEMORAL COMPRESS: NORMAL
BH CV LOWER VASCULAR LEFT SAPHENOFEMORAL JUNCTION COMPRESS: NORMAL
BH CV LOWER VASCULAR RIGHT COMMON FEMORAL AUGMENT: NORMAL
BH CV LOWER VASCULAR RIGHT COMMON FEMORAL COMPETENT: NORMAL
BH CV LOWER VASCULAR RIGHT COMMON FEMORAL COMPRESS: NORMAL
BH CV LOWER VASCULAR RIGHT COMMON FEMORAL PHASIC: NORMAL
BH CV LOWER VASCULAR RIGHT COMMON FEMORAL SPONT: NORMAL
MAXIMAL PREDICTED HEART RATE: 160 BPM
STRESS TARGET HR: 136 BPM

## 2022-08-02 PROCEDURE — 93971 EXTREMITY STUDY: CPT

## 2022-08-02 PROCEDURE — 99214 OFFICE O/P EST MOD 30 MIN: CPT | Performed by: PHYSICIAN ASSISTANT

## 2022-08-02 RX ORDER — CLINDAMYCIN HYDROCHLORIDE 300 MG/1
300 CAPSULE ORAL 3 TIMES DAILY
COMMUNITY
End: 2022-08-08

## 2022-08-02 RX ORDER — FLUCONAZOLE 150 MG/1
150 TABLET ORAL ONCE
COMMUNITY
End: 2022-10-03

## 2022-08-02 NOTE — TELEPHONE ENCOUNTER
She does not have a clot. I want her to get compression stockings and wear during the day. Continue the oral Clindamycin and I want her to fup with Agatha on Friday morning please.

## 2022-08-02 NOTE — PROGRESS NOTES
Subjective   Chief Complaint   Patient presents with   • Trauma     Sting ray on 7/16/22 left leg still swollen. Taking antibiotic for it since 7/26/22 for cellulitis   • Cough       History of Present Illness     Pt was stung by a sting ray on 7/16/22, pain was manageable and improving. Started having some puffiness and redness aroundhe rleft ankle and went to [facility]. She was started on Doxycycline on 7/26/ Flew to [place] following day and was hurting and even more red that evening. Went to urgent care in [place] 3 days ago and was given tdap, IM abx, and switched from Doxy to Clindamycin. She flex home yesterday. She has continued to have significant swelling in her left foot to mid calf and discomfort. The redness and warmth is improved. Her swelling is not. No swelling in her right foot. Denies fever and chills. No chest pain, SOA or palpitations. Not on hormones, takes Tamoxifen.      Patient Active Problem List   Diagnosis   • Insomnia   • Allergic rhinitis   • Peripheral neuropathy   • Vitamin D deficiency   • Atypical ductal hyperplasia of right breast   • Peroneal tendonitis of right lower leg   • Osteopenia   • Hyperlipidemia   • Essential hypertension   • Screening for colon cancer       No Known Allergies    Current Outpatient Medications on File Prior to Visit   Medication Sig Dispense Refill   • Biotin 5000 MCG capsule Take  by mouth.     • brompheniramine-phenylephrine-DM 2.5-1-5 MG/5ML elixir elixir Take 5 mL by mouth Every 6 (Six) Hours As Needed.     • calcium carbonate (OS-MARCO A) 600 MG tablet Take 600 mg by mouth Daily.     • clindamycin (CLEOCIN) 300 MG capsule Take 300 mg by mouth 3 (Three) Times a Day.     • fluconazole (DIFLUCAN) 150 MG tablet Take 150 mg by mouth 1 (One) Time.     • fluocinonide (LIDEX) 0.05 % ointment Apply  topically to the appropriate area as directed As Needed (psoriasis).     • fluticasone (FLONASE) 50 MCG/ACT nasal spray 2 sprays into each nostril Daily.     • loratadine  (CLARITIN) 10 MG tablet Take 10 mg by mouth Daily.     • losartan (COZAAR) 50 MG tablet TAKE 1 TABLET BY MOUTH DAILY 90 tablet 1   • melatonin 3 MG tablet Take 3 mg by mouth At Night As Needed for Sleep.     • Multiple Vitamins-Minerals (WOMENS MULTIVITAMIN PLUS PO) Take 1 tablet by mouth Daily.     • Omega 3-6-9 Fatty Acids (Omega 3-6-9 Complex) capsule Take  by mouth.     • Probiotic Product (PROBIOTIC ACIDOPHILUS BEADS) capsule Take 1 tablet by mouth Daily.     • vitamin C (ASCORBIC ACID) 250 MG tablet Take 250 mg by mouth Daily.     • Vitamin D, Cholecalciferol, 1000 units capsule Take 1,000 Units by mouth Daily.     • Imvexxy Maintenance Pack 10 MCG insert INSERT 10MCG INTO THE VAGINA TWO TIMES A WEEK 8 each 10   • raloxifene (EVISTA) 60 MG tablet Take 60 mg by mouth Daily.       No current facility-administered medications on file prior to visit.       Past Medical History:   Diagnosis Date   • Allergic rhinitis 2017   • Arthritis    • Atypical mole 2019    Mole with melanocytic atypia removed from back with MOHS procedure. Followed by Dermatologist.   • Closed nondisplaced fracture of fifth right metatarsal bone 2019   • Colon polyps     FOLLOWED BY DR. KIERSTEN ORTIZ   • Endometriosis    • Hepatitis     unknown type,as a child, lived in another country   • Hyperlipidemia     on no meds, diet managed   • Hypertension    • Neuropathy     BOTH FEET POST    • Night sweats     history during menopause   • Peroneal tendonitis of right lower leg 2019   • Post-menopause on HRT (hormone replacement therapy)     Continuously on HRT since about    • Psoriasis    • Vitamin D deficiency 2017    Takes 1000 IU / day.       Family History   Problem Relation Age of Onset   • Dementia Mother 75        Alzheimers Diseasse.   • Cataracts Mother    • Hyperlipidemia Father    • Hypertension Father    • Heart valve disorder Father         valve repair    • COPD Father         non-smoker   •  "Cataracts Father    • Melanoma Father    • Seizures Sister 48   • Cataracts Maternal Grandmother    • Osteoarthritis Maternal Grandmother    • Osteoarthritis Paternal Grandmother    • No Known Problems Daughter    • Down syndrome Daughter    • Rheum arthritis Daughter         remission    • ADD / ADHD Son    • Other Son         Human Growth Hormone deficiency   • No Known Problems Brother         adopted    • No Known Problems Maternal Grandfather          in his 80's.   • No Known Problems Paternal Grandfather          at ~ 89.   • No Known Problems Sister    • Diabetes type II Maternal Aunt    • Diabetes Maternal Aunt    • Testicular cancer Maternal Uncle    • Heart attack Paternal Aunt    • Heart disease Other    • Hypertension Other    • BRCA 1/2 Neg Hx    • Breast cancer Neg Hx    • Colon cancer Neg Hx    • Endometrial cancer Neg Hx    • Ovarian cancer Neg Hx    • Malig Hyperthermia Neg Hx        Social History     Socioeconomic History   • Marital status:      Spouse name: JOSE D   • Number of children: 3   • Years of education: College   Tobacco Use   • Smoking status: Never Smoker   • Smokeless tobacco: Never Used   Vaping Use   • Vaping Use: Never used   Substance and Sexual Activity   • Alcohol use: Yes     Comment: RARELY   • Drug use: Never   • Sexual activity: Yes     Partners: Male     Birth control/protection: Post-menopausal     Comment: SPOUSE =  JOSE D        Past Surgical History:   Procedure Laterality Date   • BREAST BIOPSY      Path= Negative. RIGHT   • BREAST BIOPSY Right 2019    Procedure: BREAST BIOPSY WITH NEEDLE LOCALIZATION;  Surgeon: Andrew Pham MD;  Location: [facility] OR [facility];  Service: General   •  SECTION      baby boy \"PETTY\" ; CS for placenta previa.   • COLONOSCOPY  [id number]    Normal @ 51. Repeat in ten years.   • COLONOSCOPY N/A 2022    2 TUBULAR ADENOMA POLYPS IN TRANSVERSE, 7 MM TUBULAR ADENOMA POLYP IN TRANSVERSE, RESCOPE IN 3 " YRS, DR. KIERSTEN ORTIZ AT [facility]   • DIAGNOSTIC LAPAROSCOPY  1999    Dr. Fabian Al, hysteroscopy w/ separtion of minimal intrauterine septum.  Laparoscopy w/ CO2 laser, lysis of adhesions and ablation of endometriosis.  Endometriosis found in the lateral LEFT pelvic sidewall, peritoneum and deeper endometriosis of the LEFT and RIGHT uterosacral ligaments.  Complex superficial endometriosis of the LEFT and RIGHT ovary.Fallopian tubes were patent with methyleneblue   • DILATATION AND CURETTAGE  1998    x2  1998     • MOLE REMOVAL  04/09/2019   • WISDOM TOOTH EXTRACTION       The following portions of the patient's history were reviewed and updated as appropriate: problem list, allergies, current medications, past medical history, past family history, past social history and past surgical history.    ROS     See HPI    Immunization History   Administered Date(s) Administered   • COVID-19 (MODERNA) 1st, 2nd, 3rd Dose Only 01/13/2021, 02/12/2021, 11/19/2021   • Flu Vaccine Quad PF >36MO 11/19/2021   • FluLaval/Fluarix/Fluzone >6 11/02/2019   • Hepatitis A 06/07/2019, 12/06/2019   • Shingrix 12/17/2020, 07/02/2021   • Tdap 06/07/2019   • flucelvax quad pfs =>4 YRS 10/07/2020       Objective   Vitals:    08/02/22 1207 08/02/22 1222   BP:  140/82   Pulse: 68    Temp: 97.8 °F (36.6 °C)    SpO2: 100%      There is no height or weight on file to calculate BMI.  Physical Exam  Vitals reviewed.   Constitutional:       Appearance: Normal appearance.   HENT:      Head: Normocephalic and atraumatic.   Cardiovascular:      Rate and Rhythm: Normal rate and regular rhythm.      Heart sounds: Normal heart sounds.   Musculoskeletal:      Comments: Slight erythema of LLE.puncture site medial left ankle. No drainage or induration. Swelling 2+ starting in her toes extending to mid calf. No palpable chords. Neg bruce's. No erythema or swelling of RLE.    Neurological:      Mental Status: She is alert.       Assessment & Plan   Diagnoses and  all orders for this visit:    1. Edema of left lower extremity (Primary)  -     Duplex Venous Lower Extremity - Left CAR    Cellulitis is improving, erythema is improved. No warmth today. The amount of swelling is concerning, stat Doppler today as well. Continue the oral Clindamycin

## 2022-08-02 NOTE — PROGRESS NOTES
Today's left lower venous doppler preliminary report is negative for deep vein thrombosis. This preliminary report was given to Rhea Rodriguez PA-C. I was instructed to tell the patient she could leave and to expect a call from someone with further instructions. Patient understands.

## 2022-08-02 NOTE — PROGRESS NOTES
Subjective   Chief Complaint   Patient presents with   • Cellulitis       History of Present Illness   61 y.o. female presents for cellulitis left foot after being stung by a stingray 7/16/2022.     Initially it was getting better but started noticing her left ankle was puffy and red at which point she went to [facility]; started on Doxycycline 07/26.    Reports the next day it was again sore and red later in the evening after flying to [place]; again went to  3 days ago while in [place] and treated with Tdap, IM ABX and changed to Clindamycin.    Returned home 8/1. Evaluated 08/02 by Rhea Rodriguez for swelling in her left foot to mid calf as well as discomfort. Redness and warmth improved. Stat Doppler negative and advised to wear compression stockings. Clindamycin continued.     Erythema very much improved; no warmth. Swelling improving since she started wearing compression stocking. No fever or chills. Finishes Clindamycin tomorrow.      Patient Active Problem List   Diagnosis   • Insomnia   • Allergic rhinitis   • Peripheral neuropathy   • Vitamin D deficiency   • Atypical ductal hyperplasia of right breast   • Peroneal tendonitis of right lower leg   • Osteopenia   • Hyperlipidemia   • Essential hypertension   • Screening for colon cancer       No Known Allergies    Current Outpatient Medications on File Prior to Visit   Medication Sig Dispense Refill   • Biotin 5000 MCG capsule Take  by mouth.     • brompheniramine-phenylephrine-DM 2.5-1-5 MG/5ML elixir elixir Take 5 mL by mouth Every 6 (Six) Hours As Needed.     • calcium carbonate (OS-MARCO A) 600 MG tablet Take 600 mg by mouth Daily.     • clindamycin (CLEOCIN) 300 MG capsule Take 300 mg by mouth 3 (Three) Times a Day.     • fluconazole (DIFLUCAN) 150 MG tablet Take 150 mg by mouth 1 (One) Time.     • fluocinonide (LIDEX) 0.05 % ointment Apply  topically to the appropriate area as directed As Needed (psoriasis).     • fluticasone (FLONASE) 50 MCG/ACT nasal spray 2 sprays into  each nostril Daily.     • loratadine (CLARITIN) 10 MG tablet Take 10 mg by mouth Daily.     • losartan (COZAAR) 50 MG tablet TAKE 1 TABLET BY MOUTH DAILY 90 tablet 1   • melatonin 3 MG tablet Take 3 mg by mouth At Night As Needed for Sleep.     • Multiple Vitamins-Minerals (WOMENS MULTIVITAMIN PLUS PO) Take 1 tablet by mouth Daily.     • Omega 3-6-9 Fatty Acids (Omega 3-6-9 Complex) capsule Take  by mouth.     • Probiotic Product (PROBIOTIC ACIDOPHILUS BEADS) capsule Take 1 tablet by mouth Daily.     • vitamin C (ASCORBIC ACID) 250 MG tablet Take 250 mg by mouth Daily.     • Vitamin D, Cholecalciferol, 1000 units capsule Take 1,000 Units by mouth Daily.       No current facility-administered medications on file prior to visit.       Past Medical History:   Diagnosis Date   • Allergic rhinitis 2017   • Arthritis    • Atypical mole 2019    Mole with melanocytic atypia removed from back with MOHS procedure. Followed by Dermatologist.   • Closed nondisplaced fracture of fifth right metatarsal bone 2019   • Colon polyps     FOLLOWED BY DR. KIERSTEN ORTIZ   • Endometriosis    • Hepatitis     unknown type,as a child, lived in another country   • Hyperlipidemia     on no meds, diet managed   • Hypertension    • Neuropathy     BOTH FEET POST    • Night sweats     history during menopause   • Peroneal tendonitis of right lower leg 2019   • Post-menopause on HRT (hormone replacement therapy)     Continuously on HRT since about    • Psoriasis    • Vitamin D deficiency 2017    Takes 1000 IU / day.       Family History   Problem Relation Age of Onset   • Dementia Mother 75        Alzheimers Diseasse.   • Cataracts Mother    • Hyperlipidemia Father    • Hypertension Father    • Heart valve disorder Father         valve repair    • COPD Father         non-smoker   • Cataracts Father    • Melanoma Father    • Seizures Sister 48   • Cataracts Maternal Grandmother    • Osteoarthritis Maternal  "Grandmother    • Osteoarthritis Paternal Grandmother    • No Known Problems Daughter    • Down syndrome Daughter    • Rheum arthritis Daughter         remission    • ADD / ADHD Son    • Other Son         Human Growth Hormone deficiency   • No Known Problems Brother         adopted    • No Known Problems Maternal Grandfather          in his 80's.   • No Known Problems Paternal Grandfather          at ~ 89.   • No Known Problems Sister    • Diabetes type II Maternal Aunt    • Diabetes Maternal Aunt    • Testicular cancer Maternal Uncle    • Heart attack Paternal Aunt    • Heart disease Other    • Hypertension Other    • BRCA 1/2 Neg Hx    • Breast cancer Neg Hx    • Colon cancer Neg Hx    • Endometrial cancer Neg Hx    • Ovarian cancer Neg Hx    • Malig Hyperthermia Neg Hx        Social History     Socioeconomic History   • Marital status:      Spouse name: JOSE D   • Number of children: 3   • Years of education: College   Tobacco Use   • Smoking status: Never Smoker   • Smokeless tobacco: Never Used   Vaping Use   • Vaping Use: Never used   Substance and Sexual Activity   • Alcohol use: Yes     Comment: RARELY   • Drug use: Never   • Sexual activity: Yes     Partners: Male     Birth control/protection: Post-menopausal     Comment: SPOUSE =  JOSE D        Past Surgical History:   Procedure Laterality Date   • BREAST BIOPSY      Path= Negative. RIGHT   • BREAST BIOPSY Right 2019    Procedure: BREAST BIOPSY WITH NEEDLE LOCALIZATION;  Surgeon: Andrew Pham MD;  Location: [facility] OR [facility] [place];  Service: General   •  SECTION      baby boy \"PETTY\" ; CS for placenta previa.   • COLONOSCOPY  [id number]    Normal @ 51. Repeat in ten years.   • COLONOSCOPY N/A 2022    2 TUBULAR ADENOMA POLYPS IN TRANSVERSE, 7 MM TUBULAR ADENOMA POLYP IN TRANSVERSE, RESCOPE IN 3 YRS, DR. KIERSTEN ORTIZ AT [facility]   • DIAGNOSTIC LAPAROSCOPY      Dr. Fabian Al, hysteroscopy w/ separtion of minimal " "intrauterine septum.  Laparoscopy w/ CO2 laser, lysis of adhesions and ablation of endometriosis.  Endometriosis found in the lateral LEFT pelvic sidewall, peritoneum and deeper endometriosis of the LEFT and RIGHT uterosacral ligaments.  Complex superficial endometriosis of the LEFT and RIGHT ovary.Fallopian tubes were patent with methyleneblue   • DILATATION AND CURETTAGE  1998    x2  1998     • MOLE REMOVAL  04/09/2019   • WISDOM TOOTH EXTRACTION         The following portions of the patient's history were reviewed and updated as appropriate: problem list, allergies, current medications, past medical history and past social history.    Review of Systems    Immunization History   Administered Date(s) Administered   • COVID-19 (MODERNA) 1st, 2nd, 3rd Dose Only 01/13/2021, 02/12/2021, 11/19/2021   • Flu Vaccine Quad PF >36MO 11/19/2021   • FluLaval/Fluarix/Fluzone >6 11/02/2019   • Hepatitis A 06/07/2019, 12/06/2019   • Shingrix 12/17/2020, 07/02/2021   • Tdap 06/07/2019   • flucelvax quad pfs =>4 YRS 10/07/2020       Objective   Vitals:    08/05/22 1003   Temp: 97.8 °F (36.6 °C)   Weight: 65.8 kg (145 lb)   Height: 162.6 cm (64\")     Body mass index is 24.89 kg/m².  Physical Exam  Constitutional:       Appearance: Normal appearance.   Pulmonary:      Effort: Pulmonary effort is normal.   Skin:     General: Skin is warm and dry.      Comments: Swelling present but very much improved. No warmth. Pink but erythema resolved.    Neurological:      Mental Status: She is alert.   Psychiatric:         Mood and Affect: Mood normal.         Behavior: Behavior normal.         Procedures    Assessment & Plan   Diagnoses and all orders for this visit:    1. Cellulitis of left lower extremity (Primary)  Comments:  Resolving after Doxycyclin and Clindamycin which she finishes tomorrow.     2. Toxic effect of contact with stingray, unintentional, sequela  Comments:  Compression stocking reducing swelling. Erythema resolved (pink). " No warmth. Pain improved. Tdap after injury.     She will call with any questions or concerns. Continue to use compression stocking. She has a regularly scheduled follow up with me in September.     Records reviewed include previous OV with Rhea Rodriguez and venous doppler.    Return for Next scheduled follow up.

## 2022-08-05 ENCOUNTER — OFFICE VISIT (OUTPATIENT)
Dept: INTERNAL MEDICINE | Facility: CLINIC | Age: 62
End: 2022-08-05

## 2022-08-05 VITALS — TEMPERATURE: 97.8 F | WEIGHT: 145 LBS | BODY MASS INDEX: 24.75 KG/M2 | HEIGHT: 64 IN

## 2022-08-05 DIAGNOSIS — T63.511S: ICD-10-CM

## 2022-08-05 DIAGNOSIS — L03.116 CELLULITIS OF LEFT LOWER EXTREMITY: Primary | ICD-10-CM

## 2022-08-05 PROCEDURE — 99213 OFFICE O/P EST LOW 20 MIN: CPT | Performed by: NURSE PRACTITIONER

## 2022-08-08 ENCOUNTER — OFFICE VISIT (OUTPATIENT)
Dept: INTERNAL MEDICINE | Facility: CLINIC | Age: 62
End: 2022-08-08

## 2022-08-08 VITALS — BODY MASS INDEX: 24.75 KG/M2 | HEIGHT: 64 IN | WEIGHT: 145 LBS | TEMPERATURE: 97.1 F

## 2022-08-08 DIAGNOSIS — L03.116 CELLULITIS OF LEFT LOWER EXTREMITY: ICD-10-CM

## 2022-08-08 DIAGNOSIS — T63.511S: ICD-10-CM

## 2022-08-08 DIAGNOSIS — L27.0 DRUG RASH: Primary | ICD-10-CM

## 2022-08-08 PROCEDURE — 96372 THER/PROPH/DIAG INJ SC/IM: CPT | Performed by: NURSE PRACTITIONER

## 2022-08-08 PROCEDURE — 99213 OFFICE O/P EST LOW 20 MIN: CPT | Performed by: NURSE PRACTITIONER

## 2022-08-08 RX ORDER — PREDNISONE 10 MG/1
TABLET ORAL
Qty: 42 TABLET | Refills: 0 | Status: SHIPPED | OUTPATIENT
Start: 2022-08-08 | End: 2022-09-28

## 2022-08-08 RX ORDER — METHYLPREDNISOLONE ACETATE 40 MG/ML
80 INJECTION, SUSPENSION INTRA-ARTICULAR; INTRALESIONAL; INTRAMUSCULAR; SOFT TISSUE ONCE
Status: COMPLETED | OUTPATIENT
Start: 2022-08-08 | End: 2022-08-08

## 2022-08-08 RX ADMIN — METHYLPREDNISOLONE ACETATE 80 MG: 40 INJECTION, SUSPENSION INTRA-ARTICULAR; INTRALESIONAL; INTRAMUSCULAR; SOFT TISSUE at 14:05

## 2022-08-08 NOTE — PROGRESS NOTES
Subjective   Chief Complaint   Patient presents with   • Rash     On trunk       History of Present Illness   61 y.o. female presents with cc rash. She is was started on Clindamycin 300 mg tid 08/03/2022 after cellulitis from sting ray injury. Started the day she took last dose 08/06/2022. Taking Zyrtec and Pepcid without relief. Using and eczema cream which provides minimal relief. It is present on her chest, abdomen and back. Starting on her legs. Worse under compression stocking on left lower leg. Pruritic. Not present in her mouth. Also absent from palms of her hands and soles of her feet.      Patient Active Problem List   Diagnosis   • Insomnia   • Allergic rhinitis   • Peripheral neuropathy   • Vitamin D deficiency   • Atypical ductal hyperplasia of right breast   • Peroneal tendonitis of right lower leg   • Osteopenia   • Hyperlipidemia   • Essential hypertension   • Screening for colon cancer       Allergies   Allergen Reactions   • Clindamycin/Lincomycin Rash       Current Outpatient Medications on File Prior to Visit   Medication Sig Dispense Refill   • Biotin 5000 MCG capsule Take  by mouth.     • brompheniramine-phenylephrine-DM 2.5-1-5 MG/5ML elixir elixir Take 5 mL by mouth Every 6 (Six) Hours As Needed.     • calcium carbonate (OS-MARCO A) 600 MG tablet Take 600 mg by mouth Daily.     • fluconazole (DIFLUCAN) 150 MG tablet Take 150 mg by mouth 1 (One) Time.     • fluocinonide (LIDEX) 0.05 % ointment Apply  topically to the appropriate area as directed As Needed (psoriasis).     • fluticasone (FLONASE) 50 MCG/ACT nasal spray 2 sprays into each nostril Daily.     • loratadine (CLARITIN) 10 MG tablet Take 10 mg by mouth Daily.     • losartan (COZAAR) 50 MG tablet TAKE 1 TABLET BY MOUTH DAILY 90 tablet 1   • melatonin 3 MG tablet Take 3 mg by mouth At Night As Needed for Sleep.     • Multiple Vitamins-Minerals (WOMENS MULTIVITAMIN PLUS PO) Take 1 tablet by mouth Daily.     • Omega 3-6-9 Fatty Acids (Omega  3-6-9 Complex) capsule Take  by mouth.     • Probiotic Product (PROBIOTIC ACIDOPHILUS BEADS) capsule Take 1 tablet by mouth Daily.     • vitamin C (ASCORBIC ACID) 250 MG tablet Take 250 mg by mouth Daily.     • Vitamin D, Cholecalciferol, 1000 units capsule Take 1,000 Units by mouth Daily.     • [DISCONTINUED] clindamycin (CLEOCIN) 300 MG capsule Take 300 mg by mouth 3 (Three) Times a Day.       No current facility-administered medications on file prior to visit.       Past Medical History:   Diagnosis Date   • Allergic rhinitis 2017   • Arthritis    • Atypical mole 2019    Mole with melanocytic atypia removed from back with MOHS procedure. Followed by Dermatologist.   • Closed nondisplaced fracture of fifth right metatarsal bone 2019   • Colon polyps     FOLLOWED BY DR. KIERSTEN ORTIZ   • Endometriosis    • Hepatitis     unknown type,as a child, lived [place]   • Hyperlipidemia     on no meds, diet managed   • Hypertension    • Neuropathy     BOTH FEET POST    • Night sweats     history during menopause   • Peroneal tendonitis of right lower leg 2019   • Post-menopause on HRT (hormone replacement therapy)     Continuously on HRT since about    • Psoriasis    • Vitamin D deficiency 2017    Takes 1000 IU / day.       Family History   Problem Relation Age of Onset   • Dementia Mother 75        Alzheimers Diseasse.   • Cataracts Mother    • Hyperlipidemia Father    • Hypertension Father    • Heart valve disorder Father         valve repair    • COPD Father         non-smoker   • Cataracts Father    • Melanoma Father    • Seizures Sister 48   • Cataracts Maternal Grandmother    • Osteoarthritis Maternal Grandmother    • Osteoarthritis Paternal Grandmother    • No Known Problems Daughter    • Down syndrome Daughter    • Rheum arthritis Daughter         remission    • ADD / ADHD Son    • Other Son         Human Growth Hormone deficiency   • No Known Problems Brother          "adopted    • No Known Problems Maternal Grandfather          in his 80's.   • No Known Problems Paternal Grandfather          at ~ 89.   • No Known Problems Sister    • Diabetes type II Maternal Aunt    • Diabetes Maternal Aunt    • Testicular cancer Maternal Uncle    • Heart attack Paternal Aunt    • Heart disease Other    • Hypertension Other    • BRCA 1/2 Neg Hx    • Breast cancer Neg Hx    • Colon cancer Neg Hx    • Endometrial cancer Neg Hx    • Ovarian cancer Neg Hx    • Malig Hyperthermia Neg Hx        Social History     Socioeconomic History   • Marital status:      Spouse name: JOSE D   • Number of children: 3   • Years of education: College   Tobacco Use   • Smoking status: Never Smoker   • Smokeless tobacco: Never Used   Vaping Use   • Vaping Use: Never used   Substance and Sexual Activity   • Alcohol use: Yes     Comment: RARELY   • Drug use: Never   • Sexual activity: Yes     Partners: Male     Birth control/protection: Post-menopausal     Comment: SPOUSE =  JOSE D        Past Surgical History:   Procedure Laterality Date   • BREAST BIOPSY      Path= Negative. RIGHT   • BREAST BIOPSY Right 2019    Procedure: BREAST BIOPSY WITH NEEDLE LOCALIZATION;  Surgeon: Andrew Pham MD;  [place] [facility] [place] [facility] [place]: [place]   •  SECTION  2005    baby boy \"PETTY\" ; CS for placenta previa.   • COLONOSCOPY  [id number]    Normal @ 51. Repeat in ten years.   • COLONOSCOPY N/A 2022    2 TUBULAR ADENOMA POLYPS IN TRANSVERSE, 7 MM TUBULAR ADENOMA POLYP IN TRANSVERSE, RESCOPE IN 3 YRS, DR. KIERSTEN ORTIZ AT [facility]   • DIAGNOSTIC LAPAROSCOPY      Dr. Fabian Al, hysteroscopy w/ separtion of minimal intrauterine septum.  Laparoscopy w/ CO2 laser, lysis of adhesions and ablation of endometriosis.  Endometriosis found in the lateral LEFT pelvic sidewall, peritoneum and deeper endometriosis of the LEFT and RIGHT uterosacral ligaments.  Complex superficial endometriosis of the LEFT " "and RIGHT ovary.Fallopian tubes were patent with methyleneblue   • DILATATION AND CURETTAGE  1998    x2  1998     • MOLE REMOVAL  04/09/2019   • WISDOM TOOTH EXTRACTION         The following portions of the patient's history were reviewed and updated as appropriate: problem list, allergies, current medications, past medical history and past social history.    Review of Systems    Immunization History   Administered Date(s) Administered   • COVID-19 (MODERNA) 1st, 2nd, 3rd Dose Only 01/13/2021, 02/12/2021, 11/19/2021   • Flu Vaccine Quad PF >36MO 11/19/2021   • FluLaval/Fluarix/Fluzone >6 11/02/2019   • Hepatitis A 06/07/2019, 12/06/2019   • Shingrix 12/17/2020, 07/02/2021   • Tdap 06/07/2019   • flucelvax quad pfs =>4 YRS 10/07/2020       Objective   Vitals:    08/08/22 1347   Temp: 97.1 °F (36.2 °C)   Weight: 65.8 kg (145 lb)   Height: 162.6 cm (64.02\")     Body mass index is 24.88 kg/m².  Physical Exam  Constitutional:       Appearance: Normal appearance.   Pulmonary:      Effort: Pulmonary effort is normal.   Skin:     General: Skin is warm and dry.      Comments: Erythematous maculopapular lesions on abdomen, chest, legs and starting to coalesce on her back. Also starting on neck. Lesions on left lower leg (under compression stocking) more macular.    Neurological:      Mental Status: She is alert.   Psychiatric:         Mood and Affect: Mood normal.         Behavior: Behavior normal.         Procedures    Assessment & Plan   Diagnoses and all orders for this visit:    1. Drug rash (Primary)  Comments:  Clindamycin now listed as allergy. IM steroids today as below with Prednisone taper to start tomorrow. She will avoid NSAIDs and alcohol while on taper.   Orders:  -     predniSONE (DELTASONE) 10 MG tablet; Take 1 tab 6x/d x 2 days then 1 tab 5x/d for 2d then 1 tab qid x 2d then 1 tab tid x 2d then 1 tab bid x 2d then 1 tab qd x2d  Dispense: 42 tablet; Refill: 0  -     methylPREDNISolone acetate (DEPO-medrol) " injection 80 mg    2. Cellulitis of left lower extremity  Comments:  ABX completed with improvement of cellulitis.     3. Toxic effect of contact with stingray, unintentional, sequela  Comments:  Continues to wear compression stocking on LLE.     Records reviewed include previous OV with myself as well as labs.     Return in about 3 days (around 8/11/2022).

## 2022-08-10 NOTE — PROGRESS NOTES
Subjective   Chief Complaint   Patient presents with   • Rash       History of Present Illness   61 y.o. female presents for 3 day follow up for drug rash after taking Clindamycin. IM Depo-Medrol 3 days ago and currently on steroid taper. Tolerating well. Rash on back and trunk much improved and fading. Itching getting better. Rah on left lower leg better. Compression stocking seemed to make rash worse so she is not using it. Pain in LLE much improved. No fever or chills.      Patient Active Problem List   Diagnosis   • Insomnia   • Allergic rhinitis   • Peripheral neuropathy   • Vitamin D deficiency   • Atypical ductal hyperplasia of right breast   • Peroneal tendonitis of right lower leg   • Osteopenia   • Hyperlipidemia   • Essential hypertension       Allergies   Allergen Reactions   • Clindamycin/Lincomycin Rash       Current Outpatient Medications on File Prior to Visit   Medication Sig Dispense Refill   • Biotin 5000 MCG capsule Take  by mouth.     • brompheniramine-phenylephrine-DM 2.5-1-5 MG/5ML elixir elixir Take 5 mL by mouth Every 6 (Six) Hours As Needed.     • calcium carbonate (OS-MARCO A) 600 MG tablet Take 600 mg by mouth Daily.     • fluocinonide (LIDEX) 0.05 % ointment Apply  topically to the appropriate area as directed As Needed (psoriasis).     • fluticasone (FLONASE) 50 MCG/ACT nasal spray 2 sprays into each nostril Daily.     • loratadine (CLARITIN) 10 MG tablet Take 10 mg by mouth Daily.     • losartan (COZAAR) 50 MG tablet TAKE 1 TABLET BY MOUTH DAILY 90 tablet 1   • melatonin 3 MG tablet Take 3 mg by mouth At Night As Needed for Sleep.     • Multiple Vitamins-Minerals (WOMENS MULTIVITAMIN PLUS PO) Take 1 tablet by mouth Daily.     • Omega 3-6-9 Fatty Acids (Omega 3-6-9 Complex) capsule Take  by mouth.     • predniSONE (DELTASONE) 10 MG tablet Take 1 tab 6x/d x 2 days then 1 tab 5x/d for 2d then 1 tab qid x 2d then 1 tab tid x 2d then 1 tab bid x 2d then 1 tab qd x2d 42 tablet 0   • Probiotic  Product (PROBIOTIC ACIDOPHILUS BEADS) capsule Take 1 tablet by mouth Daily.     • vitamin C (ASCORBIC ACID) 250 MG tablet Take 250 mg by mouth Daily.     • Vitamin D, Cholecalciferol, 1000 units capsule Take 1,000 Units by mouth Daily.     • fluconazole (DIFLUCAN) 150 MG tablet Take 150 mg by mouth 1 (One) Time.       No current facility-administered medications on file prior to visit.       Past Medical History:   Diagnosis Date   • Allergic rhinitis 2017   • Arthritis    • Atypical mole 2019    Mole with melanocytic atypia removed from back with MOHS procedure. Followed by Dermatologist.   • Closed nondisplaced fracture of fifth right metatarsal bone 2019   • Colon polyps     FOLLOWED BY DR. KIERSTEN ORTIZ   • Endometriosis    • Hepatitis     unknown type,as a child, lived in another country   • Hyperlipidemia     on no meds, diet managed   • Neuropathy     BOTH FEET POST    • Night sweats     history during menopause   • Peroneal tendonitis of right lower leg 2019   • Post-menopause on HRT (hormone replacement therapy)     Continuously on HRT since about    • Psoriasis    • Vitamin D deficiency 2017    Takes 1000 IU / day.       Family History   Problem Relation Age of Onset   • Dementia Mother 75        Alzheimers Diseasse.   • Cataracts Mother    • Hyperlipidemia Father    • Hypertension Father    • Heart valve disorder Father         valve repair    • COPD Father         non-smoker   • Cataracts Father    • Melanoma Father    • Seizures Sister 48   • Cataracts Maternal Grandmother    • Osteoarthritis Maternal Grandmother    • Osteoarthritis Paternal Grandmother    • No Known Problems Daughter    • Down syndrome Daughter    • Rheum arthritis Daughter         remission    • ADD / ADHD Son    • Other Son         Human Growth Hormone deficiency   • No Known Problems Brother         adopted    • No Known Problems Maternal Grandfather          in his 80's.   • No Known  "Problems Paternal Grandfather          at ~ 89.   • No Known Problems Sister    • Diabetes type II Maternal Aunt    • Diabetes Maternal Aunt    • Testicular cancer Maternal Uncle    • Heart attack Paternal Aunt    • Heart disease Other    • Hypertension Other    • BRCA 1/2 Neg Hx    • Breast cancer Neg Hx    • Colon cancer Neg Hx    • Endometrial cancer Neg Hx    • Ovarian cancer Neg Hx    • Malig Hyperthermia Neg Hx        Social History     Socioeconomic History   • Marital status:      Spouse name: JOSE D   • Number of children: 3   • Years of education: College   Tobacco Use   • Smoking status: Never Smoker   • Smokeless tobacco: Never Used   Vaping Use   • Vaping Use: Never used   Substance and Sexual Activity   • Alcohol use: Yes     Comment: RARELY   • Drug use: Never   • Sexual activity: Yes     Partners: Male     Birth control/protection: Post-menopausal     Comment: SPOUSE =  JOSE D        Past Surgical History:   Procedure Laterality Date   • BREAST BIOPSY      Path= Negative. RIGHT   • BREAST BIOPSY Right 2019    Procedure: BREAST BIOPSY WITH NEEDLE LOCALIZATION;  Surgeon: Andrew Pham MD;  [facility]: [facility] [place] [facility] [place];  Service: General   •  SECTION      baby boy \"PETTY\" ; CS for placenta previa.   • COLONOSCOPY  [id number]    Normal @ 51. Repeat in ten years.   • COLONOSCOPY N/A 2022    2 TUBULAR ADENOMA POLYPS IN TRANSVERSE, 7 MM TUBULAR ADENOMA POLYP IN TRANSVERSE, RESCOPE IN 3 YRS, DR. KIERSTEN ORTIZ AT [facility]   • DIAGNOSTIC LAPAROSCOPY      Dr. Fabian Al, hysteroscopy w/ separtion of minimal intrauterine septum.  Laparoscopy w/ CO2 laser, lysis of adhesions and ablation of endometriosis.  Endometriosis found in the lateral LEFT pelvic sidewall, peritoneum and deeper endometriosis of the LEFT and RIGHT uterosacral ligaments.  Complex superficial endometriosis of the LEFT and RIGHT ovary.Fallopian tubes were patent with methyleneblue   • DILATATION AND " "CURETTAGE  1998    x2  1998     • MOLE REMOVAL  04/09/2019   • WISDOM TOOTH EXTRACTION         The following portions of the patient's history were reviewed and updated as appropriate: problem list, allergies, current medications, past medical history and past social history.    Review of Systems    Immunization History   Administered Date(s) Administered   • COVID-19 (MODERNA) 1st, 2nd, 3rd Dose Only 01/13/2021, 02/12/2021, 11/19/2021   • Flu Vaccine Quad PF >36MO 11/19/2021   • FluLaval/Fluarix/Fluzone >6 11/02/2019   • Hepatitis A 06/07/2019, 12/06/2019   • Shingrix 12/17/2020, 07/02/2021   • Tdap 06/07/2019   • flucelvax quad pfs =>4 YRS 10/07/2020       Objective   Vitals:    08/11/22 0947   Temp: 97.5 °F (36.4 °C)   Height: 162.6 cm (64.02\")     Body mass index is 24.87 kg/m².  Physical Exam  Constitutional:       Appearance: Normal appearance.   Pulmonary:      Effort: Pulmonary effort is normal.   Skin:     General: Skin is warm and dry.      Comments: Rash on back and trunk very much improved and faint pink. Area on LLE no longer erythematous fading into  pink. Swelling mild ad improved.    Neurological:      Mental Status: She is alert.   Psychiatric:         Mood and Affect: Mood normal.         Behavior: Behavior normal.         Procedures    Assessment & Plan   Diagnoses and all orders for this visit:    1. Drug rash (Primary)    2. Cellulitis of left lower extremity    3. Toxic effect of contact with stingray, unintentional, sequela    4. Hyperlipidemia, unspecified hyperlipidemia type  -     Comprehensive Metabolic Panel; Future  -     Lipid Panel With / Chol / HDL Ratio; Future    Much improved. Continue steroid taper and call with any questions or concerns.     Records reviewed include previous OV with myself as well as labs.     Return for Next scheduled follow up, Lab B4 FUP.           "

## 2022-08-11 ENCOUNTER — OFFICE VISIT (OUTPATIENT)
Dept: INTERNAL MEDICINE | Facility: CLINIC | Age: 62
End: 2022-08-11

## 2022-08-11 VITALS — HEIGHT: 64 IN | BODY MASS INDEX: 24.87 KG/M2 | TEMPERATURE: 97.5 F

## 2022-08-11 DIAGNOSIS — L27.0 DRUG RASH: Primary | ICD-10-CM

## 2022-08-11 DIAGNOSIS — E78.5 HYPERLIPIDEMIA, UNSPECIFIED HYPERLIPIDEMIA TYPE: ICD-10-CM

## 2022-08-11 DIAGNOSIS — T63.511S: ICD-10-CM

## 2022-08-11 DIAGNOSIS — L03.116 CELLULITIS OF LEFT LOWER EXTREMITY: ICD-10-CM

## 2022-08-11 PROCEDURE — 99213 OFFICE O/P EST LOW 20 MIN: CPT | Performed by: NURSE PRACTITIONER

## 2022-08-22 ENCOUNTER — TELEPHONE (OUTPATIENT)
Dept: INTERNAL MEDICINE | Facility: CLINIC | Age: 62
End: 2022-08-22

## 2022-08-22 NOTE — TELEPHONE ENCOUNTER
Caller: Aleja Garcia    Relationship: Self    Best call back number: [phone]    What orders are you requesting (i.e. lab or imaging): X-RAY    In what timeframe would the patient need to come in: ASAP    Where will you receive your lab/imaging services: [facility]    Additional notes: PATIENT STATES SHE WAS STUNG BY A STINGRAY NEAR HER ANKLE ON LEFT LEG,  AND IT IS NOT GETTING ANY BETTER.  SHE IS WANTING TO KNOW IF [facility] WOULD BE WILLING TO ORDER AN X- RAY.    PLEASE ADVISE.

## 2022-08-29 ENCOUNTER — HOSPITAL ENCOUNTER (OUTPATIENT)
Dept: GENERAL RADIOLOGY | Facility: HOSPITAL | Age: 62
Discharge: HOME | End: 2022-08-29

## 2022-08-29 ENCOUNTER — OFFICE VISIT (OUTPATIENT)
Dept: INTERNAL MEDICINE | Facility: CLINIC | Age: 62
End: 2022-08-29

## 2022-08-29 VITALS — TEMPERATURE: 97 F | HEIGHT: 64 IN | BODY MASS INDEX: 24.89 KG/M2

## 2022-08-29 DIAGNOSIS — T63.511S: ICD-10-CM

## 2022-08-29 DIAGNOSIS — R60.0 EDEMA OF LEFT LOWER EXTREMITY: ICD-10-CM

## 2022-08-29 DIAGNOSIS — R60.0 EDEMA OF LEFT LOWER EXTREMITY: Primary | ICD-10-CM

## 2022-08-29 PROCEDURE — 73590 X-RAY EXAM OF LOWER LEG: CPT

## 2022-08-29 PROCEDURE — 73630 X-RAY EXAM OF FOOT: CPT

## 2022-08-29 PROCEDURE — 73610 X-RAY EXAM OF ANKLE: CPT

## 2022-08-29 PROCEDURE — 99213 OFFICE O/P EST LOW 20 MIN: CPT | Performed by: NURSE PRACTITIONER

## 2022-08-29 NOTE — PROGRESS NOTES
Subjective   Chief Complaint   Patient presents with   • swelling left ankle   • sting ray injury   • tenderness left ankle       History of Present Illness   61 y.o. female presents with cc left lower extremity edema after she was stung by a stingray in [place] July 16th.     Initially treated at [facility] 7/26 and started on Doxycycline.     Started getting red and sore 3 days after flying to [place]. Went to  where she was given Tdap, IM ABX and treated with Clindamycin for cellulitis.     Seen 8/02 by Rhea Rodriguez for considerable swelling and pain in her left foot to mid calf at which time redness and warmth had improved. Negative venous doppler. Compression stockings advised. Continued Clindamycin.     Unfortunately developed drug rash with Clindamycin after completing it. Started steroid and improved.     She called 8/22 requesting xray as lower extremity swelling better but not resolved. Area above injury feels hard. Not wearing compression stockings daily. Had imaging this morning--results not back yet.      Patient Active Problem List   Diagnosis   • Insomnia   • Allergic rhinitis   • Peripheral neuropathy   • Vitamin D deficiency   • Atypical ductal hyperplasia of right breast   • Peroneal tendonitis of right lower leg   • Osteopenia   • Hyperlipidemia   • Essential hypertension       Allergies   Allergen Reactions   • Clindamycin/Lincomycin Rash       Current Outpatient Medications on File Prior to Visit   Medication Sig Dispense Refill   • Biotin 5000 MCG capsule Take  by mouth.     • brompheniramine-phenylephrine-DM 2.5-1-5 MG/5ML elixir elixir Take 5 mL by mouth Every 6 (Six) Hours As Needed.     • calcium carbonate (OS-MARCO A) 600 MG tablet Take 600 mg by mouth Daily.     • fluconazole (DIFLUCAN) 150 MG tablet Take 150 mg by mouth 1 (One) Time.     • fluocinonide (LIDEX) 0.05 % ointment Apply  topically to the appropriate area as directed As Needed (psoriasis).     • fluticasone (FLONASE) 50 MCG/ACT nasal spray 2  sprays into each nostril Daily.     • loratadine (CLARITIN) 10 MG tablet Take 10 mg by mouth Daily.     • losartan (COZAAR) 50 MG tablet TAKE 1 TABLET BY MOUTH DAILY 90 tablet 1   • melatonin 3 MG tablet Take 3 mg by mouth At Night As Needed for Sleep.     • Multiple Vitamins-Minerals (WOMENS MULTIVITAMIN PLUS PO) Take 1 tablet by mouth Daily.     • Omega 3-6-9 Fatty Acids (Omega 3-6-9 Complex) capsule Take  by mouth.     • Probiotic Product (PROBIOTIC ACIDOPHILUS BEADS) capsule Take 1 tablet by mouth Daily.     • vitamin C (ASCORBIC ACID) 250 MG tablet Take 250 mg by mouth Daily.     • Vitamin D, Cholecalciferol, 1000 units capsule Take 1,000 Units by mouth Daily.     • predniSONE (DELTASONE) 10 MG tablet Take 1 tab 6x/d x 2 days then 1 tab 5x/d for 2d then 1 tab qid x 2d then 1 tab tid x 2d then 1 tab bid x 2d then 1 tab qd x2d 42 tablet 0     No current facility-administered medications on file prior to visit.       Past Medical History:   Diagnosis Date   • Allergic rhinitis 2017   • Arthritis    • Atypical mole 2019    Mole with melanocytic atypia removed from back with MOHS procedure. Followed by Dermatologist.   • Closed nondisplaced fracture of fifth right metatarsal bone 2019   • Colon polyps     FOLLOWED BY DR. KIERSTEN ORTIZ   • Endometriosis    • Hepatitis     unknown type,as a child, lived in another country   • Hyperlipidemia     on no meds, diet managed   • Neuropathy     BOTH FEET POST    • Night sweats     history during menopause   • Peroneal tendonitis of right lower leg 2019   • Post-menopause on HRT (hormone replacement therapy)     Continuously on HRT since about    • Psoriasis    • Vitamin D deficiency 2017    Takes 1000 IU / day.       Family History   Problem Relation Age of Onset   • Dementia Mother 75        Alzheimers Diseasse.   • Cataracts Mother    • Hyperlipidemia Father    • Hypertension Father    • Heart valve disorder Father         valve  "repair    • COPD Father         non-smoker   • Cataracts Father    • Melanoma Father    • Seizures Sister 48   • Cataracts Maternal Grandmother    • Osteoarthritis Maternal Grandmother    • Osteoarthritis Paternal Grandmother    • No Known Problems Daughter    • Down syndrome Daughter    • Rheum arthritis Daughter         remission    • ADD / ADHD Son    • Other Son         Human Growth Hormone deficiency   • No Known Problems Brother         adopted    • No Known Problems Maternal Grandfather          in his 80's.   • No Known Problems Paternal Grandfather          at ~ 89.   • No Known Problems Sister    • Diabetes type II Maternal Aunt    • Diabetes Maternal Aunt    • Testicular cancer Maternal Uncle    • Heart attack Paternal Aunt    • Heart disease Other    • Hypertension Other    • BRCA 1/2 Neg Hx    • Breast cancer Neg Hx    • Colon cancer Neg Hx    • Endometrial cancer Neg Hx    • Ovarian cancer Neg Hx    • Malig Hyperthermia Neg Hx        Social History     Socioeconomic History   • Marital status:      Spouse name: JOSE D   • Number of children: 3   • Years of education: College   Tobacco Use   • Smoking status: Never Smoker   • Smokeless tobacco: Never Used   Vaping Use   • Vaping Use: Never used   Substance and Sexual Activity   • Alcohol use: Yes     Comment: RARELY   • Drug use: Never   • Sexual activity: Yes     Partners: Male     Birth control/protection: Post-menopausal     Comment: SPOUSE =  JOSE D        Past Surgical History:   Procedure Laterality Date   • BREAST BIOPSY      Path= Negative. RIGHT   • BREAST BIOPSY Right 2019    Procedure: BREAST BIOPSY WITH NEEDLE LOCALIZATION;  Surgeon: Andrew Pham MD;  [place] [facility] [place] [facility] [place]  Service: General   •  SECTION      baby boy \"PETTY\" ; CS for placenta previa.   • COLONOSCOPY  [id number]    Normal @ 51. Repeat in ten years.   • COLONOSCOPY N/A 2022    2 TUBULAR ADENOMA POLYPS IN TRANSVERSE, 7 MM " "TUBULAR ADENOMA POLYP IN TRANSVERSE, RESCOPE IN 3 YRS, DR. KIERSTEN ORTIZ AT [facility]   • DIAGNOSTIC LAPAROSCOPY  1999    Dr. Fabian Al, hysteroscopy w/ separtion of minimal intrauterine septum.  Laparoscopy w/ CO2 laser, lysis of adhesions and ablation of endometriosis.  Endometriosis found in the lateral LEFT pelvic sidewall, peritoneum and deeper endometriosis of the LEFT and RIGHT uterosacral ligaments.  Complex superficial endometriosis of the LEFT and RIGHT ovary.Fallopian tubes were patent with methyleneblue   • DILATATION AND CURETTAGE  1998    x2  1998     • MOLE REMOVAL  04/09/2019   • WISDOM TOOTH EXTRACTION         The following portions of the patient's history were reviewed and updated as appropriate: problem list, allergies, current medications, past medical history and past social history.    Review of Systems    Immunization History   Administered Date(s) Administered   • COVID-19 (MODERNA) 1st, 2nd, 3rd Dose Only 01/13/2021, 02/12/2021, 11/19/2021   • Flu Vaccine Quad PF >36MO 11/19/2021   • FluLaval/Fluzone >6mos 11/02/2019   • Hepatitis A 06/07/2019, 12/06/2019   • Shingrix 12/17/2020, 07/02/2021   • Tdap 06/07/2019   • flucelvax quad pfs =>4 YRS 10/07/2020       Objective   Vitals:    08/29/22 1311   Temp: 97 °F (36.1 °C)   Height: 162.6 cm (64\")     Body mass index is 24.89 kg/m².  Physical Exam  Constitutional:       Appearance: Normal appearance. She is normal weight.   Musculoskeletal:      Comments: Swelling left lower extremity much improved; now trace to 1+. Erythema resolved. Area above injury TTP.    Skin:     General: Skin is warm and dry.   Neurological:      Mental Status: She is alert.   Psychiatric:         Mood and Affect: Mood normal.         Behavior: Behavior normal.         Procedures    Assessment & Plan   Diagnoses and all orders for this visit:    1. Edema of left lower extremity (Primary)  Comments:  Left ankle much improved but not yet resolved. Imaging done earlier today but " not yet resulted. Compression stocking advised.   Orders:  -     XR Foot 3+ View Left; Future  -     XR Ankle 3+ View Left; Future  -     XR Tibia Fibula 2 View Left; Future    2. Toxic effect of contact with stingray, unintentional, sequela  -     XR Foot 3+ View Left; Future  -     XR Ankle 3+ View Left; Future  -     XR Tibia Fibula 2 View Left; Future    Records reviewed include previous OVs with myself and Rhea Rodriguez as well as labs.     Return for Next scheduled follow up.

## 2022-10-03 ENCOUNTER — OFFICE VISIT (OUTPATIENT)
Dept: INTERNAL MEDICINE | Facility: CLINIC | Age: 62
End: 2022-10-03

## 2022-10-03 VITALS
HEIGHT: 64 IN | TEMPERATURE: 97.7 F | SYSTOLIC BLOOD PRESSURE: 128 MMHG | HEART RATE: 68 BPM | RESPIRATION RATE: 14 BRPM | DIASTOLIC BLOOD PRESSURE: 80 MMHG | BODY MASS INDEX: 24.59 KG/M2 | WEIGHT: 144 LBS

## 2022-10-03 DIAGNOSIS — E55.9 VITAMIN D DEFICIENCY: ICD-10-CM

## 2022-10-03 DIAGNOSIS — I10 ESSENTIAL HYPERTENSION: Primary | ICD-10-CM

## 2022-10-03 DIAGNOSIS — E78.5 HYPERLIPIDEMIA, UNSPECIFIED HYPERLIPIDEMIA TYPE: ICD-10-CM

## 2022-10-03 PROCEDURE — 99214 OFFICE O/P EST MOD 30 MIN: CPT | Performed by: NURSE PRACTITIONER

## 2022-10-03 RX ORDER — LOSARTAN POTASSIUM 50 MG/1
50 TABLET ORAL DAILY
Qty: 90 TABLET | Refills: 1 | Status: SHIPPED | OUTPATIENT
Start: 2022-10-03

## 2022-10-03 RX ORDER — TAMOXIFEN CITRATE 10 MG/1
10 TABLET ORAL DAILY
COMMUNITY
Start: 2022-09-19 | End: 2023-01-04 | Stop reason: SDUPTHER

## 2022-10-13 ENCOUNTER — LAB (OUTPATIENT)
Dept: LAB | Facility: HOSPITAL | Age: 62
End: 2022-10-13

## 2022-10-13 ENCOUNTER — OFFICE VISIT (OUTPATIENT)
Dept: ONCOLOGY | Facility: CLINIC | Age: 62
End: 2022-10-13

## 2022-10-13 VITALS
DIASTOLIC BLOOD PRESSURE: 77 MMHG | BODY MASS INDEX: 24.53 KG/M2 | HEART RATE: 67 BPM | TEMPERATURE: 96.8 F | SYSTOLIC BLOOD PRESSURE: 134 MMHG | OXYGEN SATURATION: 98 % | WEIGHT: 143.7 LBS | RESPIRATION RATE: 18 BRPM | HEIGHT: 64 IN

## 2022-10-13 DIAGNOSIS — N60.91 ATYPICAL DUCTAL HYPERPLASIA OF RIGHT BREAST: Primary | ICD-10-CM

## 2022-10-13 LAB
ALBUMIN SERPL-MCNC: 4.3 G/DL (ref 3.5–5.2)
ALBUMIN/GLOB SERPL: 1.7 G/DL (ref 1.1–2.4)
ALP SERPL-CCNC: 78 U/L (ref 38–116)
ALT SERPL W P-5'-P-CCNC: 20 U/L (ref 0–33)
ANION GAP SERPL CALCULATED.3IONS-SCNC: 12 MMOL/L (ref 5–15)
AST SERPL-CCNC: 22 U/L (ref 0–32)
BASOPHILS # BLD AUTO: 0.1 10*3/MM3 (ref 0–0.2)
BASOPHILS NFR BLD AUTO: 1.5 % (ref 0–1.5)
BILIRUB SERPL-MCNC: 0.4 MG/DL (ref 0.2–1.2)
BUN SERPL-MCNC: 16 MG/DL (ref 6–20)
BUN/CREAT SERPL: 20.8 (ref 7.3–30)
CALCIUM SPEC-SCNC: 10.1 MG/DL (ref 8.5–10.2)
CHLORIDE SERPL-SCNC: 105 MMOL/L (ref 98–107)
CO2 SERPL-SCNC: 26 MMOL/L (ref 22–29)
CREAT SERPL-MCNC: 0.77 MG/DL (ref 0.6–1.1)
DEPRECATED RDW RBC AUTO: 39.8 FL (ref 37–54)
EGFRCR SERPLBLD CKD-EPI 2021: 88.4 ML/MIN/1.73
EOSINOPHIL # BLD AUTO: 0.13 10*3/MM3 (ref 0–0.4)
EOSINOPHIL NFR BLD AUTO: 2 % (ref 0.3–6.2)
ERYTHROCYTE [DISTWIDTH] IN BLOOD BY AUTOMATED COUNT: 12 % (ref 12.3–15.4)
GLOBULIN UR ELPH-MCNC: 2.5 GM/DL (ref 1.8–3.5)
GLUCOSE SERPL-MCNC: 93 MG/DL (ref 74–124)
HCT VFR BLD AUTO: 41.4 % (ref 34–46.6)
HGB BLD-MCNC: 13.8 G/DL (ref 12–15.9)
IMM GRANULOCYTES # BLD AUTO: 0.03 10*3/MM3 (ref 0–0.05)
IMM GRANULOCYTES NFR BLD AUTO: 0.5 % (ref 0–0.5)
LYMPHOCYTES # BLD AUTO: 2.51 10*3/MM3 (ref 0.7–3.1)
LYMPHOCYTES NFR BLD AUTO: 38 % (ref 19.6–45.3)
MCH RBC QN AUTO: 30.3 PG (ref 26.6–33)
MCHC RBC AUTO-ENTMCNC: 33.3 G/DL (ref 31.5–35.7)
MCV RBC AUTO: 91 FL (ref 79–97)
MONOCYTES # BLD AUTO: 0.48 10*3/MM3 (ref 0.1–0.9)
MONOCYTES NFR BLD AUTO: 7.3 % (ref 5–12)
NEUTROPHILS NFR BLD AUTO: 3.36 10*3/MM3 (ref 1.7–7)
NEUTROPHILS NFR BLD AUTO: 50.7 % (ref 42.7–76)
NRBC BLD AUTO-RTO: 0 /100 WBC (ref 0–0.2)
PLATELET # BLD AUTO: 262 10*3/MM3 (ref 140–450)
PMV BLD AUTO: 9.3 FL (ref 6–12)
POTASSIUM SERPL-SCNC: 4.3 MMOL/L (ref 3.5–4.7)
PROT SERPL-MCNC: 6.8 G/DL (ref 6.3–8)
RBC # BLD AUTO: 4.55 10*6/MM3 (ref 3.77–5.28)
SODIUM SERPL-SCNC: 143 MMOL/L (ref 134–145)
WBC NRBC COR # BLD: 6.61 10*3/MM3 (ref 3.4–10.8)

## 2022-10-13 PROCEDURE — 85025 COMPLETE CBC W/AUTO DIFF WBC: CPT

## 2022-10-13 PROCEDURE — 36415 COLL VENOUS BLD VENIPUNCTURE: CPT

## 2022-10-13 PROCEDURE — 99214 OFFICE O/P EST MOD 30 MIN: CPT | Performed by: INTERNAL MEDICINE

## 2022-10-13 PROCEDURE — 80053 COMPREHEN METABOLIC PANEL: CPT

## 2022-10-13 NOTE — PROGRESS NOTES
Subjective     REASON FOR CONSULTATION:   1.Atypical ductal hyperplasia right breast postLumpectomy  2.  Osteopenia preventative Evista started in                                REQUESTING PHYSICIAN: MD Stuart Ge MD      History of Present Illness patient is a 61-year-old postmenopausal lady with a diagnosis of ADH made on a right breast biopsy.    She has been on Evista for 2 year a and at her last visit we switch her to tamoxifen because of worsening vaginal dryness and at the 10 mg dose she is doing great and has no side effects whatsoever       she is vaccinated against COVID-19     she did have a mammogram on the  and thankfully everything was benign and her imaging is due again in 2023 as well as a bone density in 2023    She is up-to-date with colonoscopies and screening      Past Medical History:   Diagnosis Date   • Allergic rhinitis 2017   • Arthritis    • Atypical mole 2019    Mole with melanocytic atypia removed from back with MOHS procedure. Followed by Dermatologist.   • Closed nondisplaced fracture of fifth right metatarsal bone 2019   • Colon polyps     FOLLOWED BY DR. KIERSTEN ORTIZ   • Endometriosis    • Hepatitis     unknown type,as a child, lived in another country   • Hyperlipidemia     on no meds, diet managed   • Neuropathy     BOTH FEET POST    • Night sweats     history during menopause   • Peroneal tendonitis of right lower leg 2019   • Post-menopause on HRT (hormone replacement therapy)     Continuously on HRT since about    • Psoriasis    • Vitamin D deficiency 2017    Takes 1000 IU / day.        Past Surgical History:   Procedure Laterality Date   • BREAST BIOPSY      Path= Negative. RIGHT   • BREAST BIOPSY Right 2019    Procedure: BREAST BIOPSY WITH NEEDLE LOCALIZATION;  Surgeon: Andrew Pham MD;  Location: [facility] [place] [facility] [place]   "Service: General   •  SECTION      baby boy \"PETTY\" ; CS for placenta previa.   • COLONOSCOPY  [id number]    Normal @ 51. Repeat in ten years.   • COLONOSCOPY N/A 2022    2 TUBULAR ADENOMA POLYPS IN TRANSVERSE, 7 MM TUBULAR ADENOMA POLYP IN TRANSVERSE, RESCOPE IN 3 YRS, DR. KIERSTEN ORTIZ AT [facility]   • DIAGNOSTIC LAPAROSCOPY      Dr. Fabian Al, hysteroscopy w/ separtion of minimal intrauterine septum.  Laparoscopy w/ CO2 laser, lysis of adhesions and ablation of endometriosis.  Endometriosis found in the lateral LEFT pelvic sidewall, peritoneum and deeper endometriosis of the LEFT and RIGHT uterosacral ligaments.  Complex superficial endometriosis of the LEFT and RIGHT ovary.Fallopian tubes were patent with methyleneblue   • DILATATION AND CURETTAGE  1998    x2       • MOLE REMOVAL  2019   • WISDOM TOOTH EXTRACTION        ONC HISTORY:   patient is a 58-year-old female with no chronic medical illnesses on no chronic medications who is just come off 5 years of hormone replacement therapy after menopause for menopausal symptoms after a recent mammogram showed an abnormality.  Patient's mammogram in March of last year was benign and this year there was a 16 mm area of abnormality that required further evaluation and a biopsy was done on  2019 A biopsy was done at the 3:30 position of the right breast showing atypical ductal hyperplasia : With microcalcifications clustered in these areas and in benign breast parenchyma there was also a radial scar.   Patient went on to have a lumpectomy on 2019 with radial scar and ductal hyperplasia of the usual type and no other abnormalities.  She has been referred to us to discuss chemoprevention    She is  5 para 3 with 2 miscarriages first childbirth was at age 34 she breast-fed all 3 children menarche was at age 15 menopause at age 51 and she has been on estradiol since menopause and stopped and her biopsy showed atypical ductal " hyperplasia last month    There is no family history of breast or ovarian cancer.  She has a maternal uncle  of testicular cancer as a young age      I calculated her lifetime risk of breast cancer based on the Caterina model and is calculated to a 5-year risk of 4.6% of the lifetime risk of 26%.  At this time she is still dealing with withdrawal symptoms from  stopping her hormone replacement and I think we can afford to wait for a while to begin this.  She would like to wait until she has a breast MRI in October and I have suggested she have a DEXA scan to see if there is concomitant osteopenia or osteoporosis which would also benefit from Evista which I think is the easiest medication to offer her    he has had bilateral breast MRIs done a month ago which was negative and bone density testing that showed moderate osteopenia in her hips and spine.    She got up quickly from sitting with numbness in her left foot and she twisted and broke her toe and is in a boot for about 3 more weeks but other than that she is doing well    We again discussed the rationale for prevention and she is agreeable as long as she has no side effects and I think this is reasonable    We have opted to start with Evista 60 mg daily we discussed the side effect profile including hot flashes some bone stiffness and very minuscule risk of DVT  And she is agreeable and I prescribed the drug for    She knows to call before her next appointment if she has side effects and we could consider low-dose tamoxifen 5 mg if she does not tolerate this        Current Outpatient Medications on File Prior to Visit   Medication Sig Dispense Refill   • Biotin 5000 MCG capsule Take  by mouth.     • calcium carbonate (OS-MARCO A) 600 MG tablet Take 600 mg by mouth Daily.     • fluocinonide (LIDEX) 0.05 % ointment Apply  topically to the appropriate area as directed As Needed (psoriasis).     • fluticasone (FLONASE) 50 MCG/ACT nasal spray 2 sprays into each nostril  Daily.     • loratadine (CLARITIN) 10 MG tablet Take 10 mg by mouth Daily.     • losartan (COZAAR) 50 MG tablet Take 1 tablet by mouth Daily. 90 tablet 1   • melatonin 3 MG tablet Take 3 mg by mouth At Night As Needed for Sleep.     • Multiple Vitamins-Minerals (WOMENS MULTIVITAMIN PLUS PO) Take 1 tablet by mouth Daily.     • Omega 3-6-9 Fatty Acids (Omega 3-6-9 Complex) capsule Take  by mouth.     • Probiotic Product (PROBIOTIC ACIDOPHILUS BEADS) capsule Take 1 tablet by mouth Daily.     • tamoxifen (NOLVADEX) 10 MG tablet Take 10 mg by mouth Daily.     • vitamin C (ASCORBIC ACID) 250 MG tablet Take 250 mg by mouth Daily.     • Vitamin D, Cholecalciferol, 1000 units capsule Take 1,000 Units by mouth Daily.       No current facility-administered medications on file prior to visit.        ALLERGIES:    Allergies   Allergen Reactions   • Clindamycin/Lincomycin Rash        Social History     Socioeconomic History   • Marital status:      Spouse name: JOSE D   • Number of children: 3   • Years of education: College   Tobacco Use   • Smoking status: Never   • Smokeless tobacco: Never   Vaping Use   • Vaping Use: Never used   Substance and Sexual Activity   • Alcohol use: Yes     Comment: RARELY   • Drug use: Never   • Sexual activity: Yes     Partners: Male     Birth control/protection: Post-menopausal     Comment: SPOUSE =  JOSE D         Family History   Problem Relation Age of Onset   • Dementia Mother 75        Alzheimers Diseasse.   • Cataracts Mother    • Hyperlipidemia Father    • Hypertension Father    • Heart valve disorder Father         valve repair    • COPD Father         non-smoker   • Cataracts Father    • Melanoma Father    • Seizures Sister 48   • Cataracts Maternal Grandmother    • Osteoarthritis Maternal Grandmother    • Osteoarthritis Paternal Grandmother    • No Known Problems Daughter    • Down syndrome Daughter    • Rheum arthritis Daughter         remission    • ADD / ADHD Son    • Other Son          Human Growth Hormone deficiency   • No Known Problems Brother         adopted    • No Known Problems Maternal Grandfather          in his 80's.   • No Known Problems Paternal Grandfather          at ~ 89.   • No Known Problems Sister    • Diabetes type II Maternal Aunt    • Diabetes Maternal Aunt    • Testicular cancer Maternal Uncle    • Heart attack Paternal Aunt    • Heart disease Other    • Hypertension Other    • BRCA 1/2 Neg Hx    • Breast cancer Neg Hx    • Colon cancer Neg Hx    • Endometrial cancer Neg Hx    • Ovarian cancer Neg Hx    • Malig Hyperthermia Neg Hx         Review of Systems   Constitutional: Positive for diaphoresis (stable ). Negative for appetite change, chills, fatigue, fever and unexpected weight change.   HENT: Negative for hearing loss, sore throat and trouble swallowing.    Respiratory: Negative for cough, chest tightness, shortness of breath and wheezing.    Cardiovascular: Positive for leg swelling (Chronic ankle edema for 12 years same ). Negative for chest pain and palpitations.   Gastrointestinal: Negative for abdominal distention, abdominal pain, constipation, diarrhea, nausea and vomiting.   Genitourinary: Negative for dysuria, frequency, hematuria and urgency.   Musculoskeletal: Positive for arthralgias (improved). Negative for joint swelling.        No muscle weakness.   Skin: Negative for rash and wound.   Neurological: Positive for numbness (Peripheral neuropathy in both legs since the birth of her third child 13 years ago etiology unclear same ) and headaches (seasonal). Negative for seizures, syncope, speech difficulty and weakness.   Hematological: Negative for adenopathy. Does not bruise/bleed easily.   Psychiatric/Behavioral: Negative for behavioral problems, confusion and suicidal ideas.   All other systems reviewed and are negative.   I have reviewed and confirmed the accuracy of the ROS as documented by the MA/LPN/RN Ching Guerrero MA      Objective      There were no vitals filed for this visit.  Current Status 10/13/2022   ECOG score 0       Physical Exam   Pulmonary/Chest:           GENERAL:  Well-developed, well-nourished in no acute distress.   SKIN:  Warm, dry without rashes, purpura or petechiae.  Atypical nevus left scapular area and right flank  EYES:  Pupils equal, round and reactive to light.  EOMs intact.  Conjunctivae normal.  EARS:  Hearing intact.  NOSE:  Septum midline.  No excoriations or nasal discharge.  MOUTH:  Tongue is well-papillated; no stomatitis or ulcers.  Lips normal.  THROAT:  Oropharynx without lesions or exudates.  NECK:  Supple with good range of motion; no thyromegaly or masses, no JVD.  LYMPHATICS:  No cervical, supraclavicular, axillary or inguinal adenopathy.  CHEST:  Lungs clear to auscultation. Good airflow.  BREASTS: Right breast shows lumpectomy scar in the lower inner quadrant well-healed - left breast shows a small nodule at the 7 o'clock position  CARDIAC:  Regular rate and rhythm without murmurs, rubs or gallops. Normal S1,S2.  ABDOMEN:  Soft, nontender with no hepatosplenomegaly or masses.  EXTREMITIES:  No clubbing, cyanosis -left foot is in a boot after toe fracture.  NEUROLOGICAL:  Cranial Nerves II-XII grossly intact.  No focal neurological deficits.  PSYCHIATRIC:  Normal affect and mood.      I have reexamined the patient and the results are consistent with the previously documented exam. Ching Guerrero MA         RECENT LABS:  Hematology WBC   Date Value Ref Range Status   10/13/2022 6.61 3.40 - 10.80 10*3/mm3 Final     RBC   Date Value Ref Range Status   10/13/2022 4.55 3.77 - 5.28 10*6/mm3 Final     Hemoglobin   Date Value Ref Range Status   10/13/2022 13.8 12.0 - 15.9 g/dL Final     Hematocrit   Date Value Ref Range Status   10/13/2022 41.4 34.0 - 46.6 % Final     Platelets   Date Value Ref Range Status   10/13/2022 262 140 - 450 10*3/mm3 Final        Study Result     BONE MINERAL DENSITOMETRY    IMPRESSION:  Osteopenia.     This report was finalized on 7/8/2019 3:22 PM by Dr. Darrin Perez M.D.   Bilateral breast MRI  IMPRESSION:  There are no findings suspicious for malignancy in either  breast. Routine follow-up imaging is recommended.     BI-RADS Category 2: Benign.     This report was finalized on 9/16/2019     Assessment & Plan   1.  Atypical ductal hyperplasia multifocal right breast postlumpectomy  · Evista 60 mg  start in 1/20  · Switch to tamoxifen 4/22 due to vaginal dryness  · Tolerating tamoxifen well in 10/22    2 . atypical nevus.  With high-grade dysplasia removed from her back    3.  Peripheral neuropathy of unknown etiology post third childbirth    4.  Osteopenia with a fracture of her left small toe    5.  Vaginal irritation using vaginal estrogens twice a week since 3/21-stopped with tamoxifen    Plan  1.  Continue tamoxifen 10 mg daily  2.  Return in 1 year for follow-up with a mammogram in March 2023

## 2022-12-01 NOTE — PROGRESS NOTES
Subjective   Chief Complaint   Patient presents with   • Fever   • Headache       History of Present Illness   62 y.o. female presents with cc sore throat, HA, body aches, head congestion and NPC ongoing for 3 days. Her son tested positive for the flu earlier this week. She is taking Tylenol, Advil and Mucinex DM.      Patient Active Problem List   Diagnosis   • Insomnia   • Allergic rhinitis   • Peripheral neuropathy   • Vitamin D deficiency   • Atypical ductal hyperplasia of right breast   • Peroneal tendonitis of right lower leg   • Osteopenia   • Hyperlipidemia   • Essential hypertension       Allergies   Allergen Reactions   • Clindamycin/Lincomycin Rash       Current Outpatient Medications on File Prior to Visit   Medication Sig Dispense Refill   • Biotin 5000 MCG capsule Take  by mouth.     • calcium carbonate (OS-MARCO A) 600 MG tablet Take 600 mg by mouth Daily.     • fluocinonide (LIDEX) 0.05 % ointment Apply  topically to the appropriate area as directed As Needed (psoriasis).     • fluticasone (FLONASE) 50 MCG/ACT nasal spray 2 sprays into each nostril Daily.     • loratadine (CLARITIN) 10 MG tablet Take 10 mg by mouth Daily.     • losartan (COZAAR) 50 MG tablet Take 1 tablet by mouth Daily. 90 tablet 1   • melatonin 3 MG tablet Take 3 mg by mouth At Night As Needed for Sleep.     • Multiple Vitamins-Minerals (WOMENS MULTIVITAMIN PLUS PO) Take 1 tablet by mouth Daily.     • Omega 3-6-9 Fatty Acids (Omega 3-6-9 Complex) capsule Take  by mouth.     • Probiotic Product (PROBIOTIC ACIDOPHILUS BEADS) capsule Take 1 tablet by mouth Daily.     • tamoxifen (NOLVADEX) 10 MG tablet Take 10 mg by mouth Daily.     • vitamin C (ASCORBIC ACID) 250 MG tablet Take 250 mg by mouth Daily.     • Vitamin D, Cholecalciferol, 1000 units capsule Take 1,000 Units by mouth Daily.       No current facility-administered medications on file prior to visit.       Past Medical History:   Diagnosis Date   • Allergic rhinitis 02/24/2017    • Arthritis    • Atypical mole 2019    Mole with melanocytic atypia removed from back with MOHS procedure. Followed by Dermatologist.   • Closed nondisplaced fracture of fifth right metatarsal bone 2019   • Colon polyps     FOLLOWED BY DR. KIERSTEN ORTIZ   • Endometriosis    • Hepatitis     unknown type,as a child, lived in another country   • Hyperlipidemia     on no meds, diet managed   • Neuropathy     BOTH FEET POST    • Night sweats     history during menopause   • Peroneal tendonitis of right lower leg 2019   • Post-menopause on HRT (hormone replacement therapy)     Continuously on HRT since about    • Psoriasis    • Vitamin D deficiency 2017    Takes 1000 IU / day.       Family History   Problem Relation Age of Onset   • Dementia Mother 75        Alzheimers Diseasse.   • Cataracts Mother    • Hyperlipidemia Father    • Hypertension Father    • Heart valve disorder Father         valve repair    • COPD Father         non-smoker   • Cataracts Father    • Melanoma Father    • Seizures Sister 48   • Cataracts Maternal Grandmother    • Osteoarthritis Maternal Grandmother    • Osteoarthritis Paternal Grandmother    • No Known Problems Daughter    • Down syndrome Daughter    • Rheum arthritis Daughter         remission    • ADD / ADHD Son    • Other Son         Human Growth Hormone deficiency   • No Known Problems Brother         adopted    • No Known Problems Maternal Grandfather          in his 80's.   • No Known Problems Paternal Grandfather          at ~ 89.   • No Known Problems Sister    • Diabetes type II Maternal Aunt    • Diabetes Maternal Aunt    • Testicular cancer Maternal Uncle    • Heart attack Paternal Aunt    • Heart disease Other    • Hypertension Other    • BRCA 1/2 Neg Hx    • Breast cancer Neg Hx    • Colon cancer Neg Hx    • Endometrial cancer Neg Hx    • Ovarian cancer Neg Hx    • Malig Hyperthermia Neg Hx        Social History     Socioeconomic History  "  • Marital status:      Spouse name: JOSE D   • Number of children: 3   • Years of education: College   Tobacco Use   • Smoking status: Never   • Smokeless tobacco: Never   Vaping Use   • Vaping Use: Never used   Substance and Sexual Activity   • Alcohol use: Yes     Comment: RARELY   • Drug use: Never   • Sexual activity: Yes     Partners: Male     Birth control/protection: Post-menopausal     Comment: SPOUSE =  JOSE D        Past Surgical History:   Procedure Laterality Date   • BREAST BIOPSY      Path= Negative. RIGHT   • BREAST BIOPSY Right 2019    Procedure: BREAST BIOPSY WITH NEEDLE LOCALIZATION;  Surgeon: Andrew Pham MD;  [place] [facility] [place] [facility] [place]  Service: General   •  SECTION  2005    baby boy \"PETTY\" ; CS for placenta previa.   • COLONOSCOPY  [id number]    Normal @ 51. Repeat in ten years.   • COLONOSCOPY N/A 2022    2 TUBULAR ADENOMA POLYPS IN TRANSVERSE, 7 MM TUBULAR ADENOMA POLYP IN TRANSVERSE, RESCOPE IN 3 YRS, DR. KIERSTEN ORTIZ AT [facility]   • DIAGNOSTIC LAPAROSCOPY      Dr. Fabian Al, hysteroscopy w/ separtion of minimal intrauterine septum.  Laparoscopy w/ CO2 laser, lysis of adhesions and ablation of endometriosis.  Endometriosis found in the lateral LEFT pelvic sidewall, peritoneum and deeper endometriosis of the LEFT and RIGHT uterosacral ligaments.  Complex superficial endometriosis of the LEFT and RIGHT ovary.Fallopian tubes were patent with methyleneblue   • DILATATION AND CURETTAGE  1998    x2       • MOLE REMOVAL  2019   • WISDOM TOOTH EXTRACTION         The following portions of the patient's history were reviewed and updated as appropriate: problem list, allergies, current medications, past medical history and past social history.    Review of Systems    Immunization History   Administered Date(s) Administered   • COVID-19 (MODERNA) 1st, 2nd, 3rd Dose Only 2021, 2021, 2021   • COVID-19 (MODERNA) BOOSTER 2022   • Flu " "Vaccine Quad PF >36MO 11/19/2021, 09/17/2022   • FluLaval/Fluzone >6mos 11/02/2019   • Hepatitis A 06/07/2019, 12/06/2019   • Shingrix 12/17/2020, 07/02/2021   • Tdap 06/07/2019   • flucelvax quad pfs =>4 YRS 10/07/2020       Objective   Vitals:    12/02/22 1030   Temp: 99.2 °F (37.3 °C)   Weight: 64.9 kg (143 lb)   Height: 162.6 cm (64.02\")     Body mass index is 24.53 kg/m².  Physical Exam  Vitals reviewed.   Constitutional:       Appearance: Normal appearance.   HENT:      Head: Normocephalic and atraumatic.      Ears:      Comments: Fluid present behind TM-B.      Nose: Congestion present.      Mouth/Throat:      Mouth: Mucous membranes are moist.      Pharynx: Oropharynx is clear. No oropharyngeal exudate or posterior oropharyngeal erythema.      Comments: +PND.   Cardiovascular:      Rate and Rhythm: Normal rate and regular rhythm.      Heart sounds: Normal heart sounds.   Pulmonary:      Effort: Pulmonary effort is normal.      Breath sounds: Normal breath sounds.   Musculoskeletal:      Cervical back: Neck supple.   Lymphadenopathy:      Cervical: No cervical adenopathy.   Skin:     General: Skin is warm and dry.   Neurological:      Mental Status: She is alert.   Psychiatric:         Mood and Affect: Mood normal.         Behavior: Behavior normal.         Procedures    Assessment & Plan   Diagnoses and all orders for this visit:    1. Influenza A (Primary)  Comments:  RFT +flu A. Rapid COVID negative. Symptomatic treatment with Mucinex and Tylenol as discussed.   Orders:  -     guaiFENesin (Mucinex) 600 MG 12 hr tablet; Take 2 tablets by mouth 2 (Two) Times a Day.  -     acetaminophen (Tylenol) 325 MG tablet; Take 2 tablets by mouth Every 6 (Six) Hours As Needed for Mild Pain or Fever.        Return for Next scheduled follow up.           "

## 2022-12-02 ENCOUNTER — OFFICE VISIT (OUTPATIENT)
Dept: INTERNAL MEDICINE | Facility: CLINIC | Age: 62
End: 2022-12-02

## 2022-12-02 VITALS — HEIGHT: 64 IN | BODY MASS INDEX: 24.41 KG/M2 | TEMPERATURE: 99.2 F | WEIGHT: 143 LBS

## 2022-12-02 DIAGNOSIS — J10.1 INFLUENZA A: Primary | ICD-10-CM

## 2022-12-02 LAB
EXPIRATION DATE: ABNORMAL
FLUAV AG UPPER RESP QL IA.RAPID: DETECTED
FLUBV AG UPPER RESP QL IA.RAPID: NOT DETECTED
INTERNAL CONTROL: ABNORMAL
Lab: ABNORMAL
SARS-COV-2 AG UPPER RESP QL IA.RAPID: NOT DETECTED

## 2022-12-02 PROCEDURE — 87428 SARSCOV & INF VIR A&B AG IA: CPT | Performed by: NURSE PRACTITIONER

## 2022-12-02 PROCEDURE — 99213 OFFICE O/P EST LOW 20 MIN: CPT | Performed by: NURSE PRACTITIONER

## 2022-12-02 RX ORDER — ACETAMINOPHEN 325 MG/1
650 TABLET ORAL EVERY 6 HOURS PRN
Start: 2022-12-02

## 2022-12-02 RX ORDER — GUAIFENESIN 600 MG/1
1200 TABLET, EXTENDED RELEASE ORAL 2 TIMES DAILY
Start: 2022-12-02 | End: 2023-02-05

## 2023-01-04 RX ORDER — TAMOXIFEN CITRATE 10 MG/1
10 TABLET ORAL DAILY
Qty: 90 TABLET | Refills: 1 | Status: SHIPPED | OUTPATIENT
Start: 2023-01-04

## 2023-02-05 NOTE — PROGRESS NOTES
Subjective   Chief Complaint   Patient presents with   • Annual Exam     physical       History of Present Illness   62 y.o. female presents for annual physical. Not walking as much as it is cold. Notes dietary indiscretions around the holidays. Continues Pilates.     P/P with Dr. Geiger in May with [facility].     CLS with Dr. Abdirashid HERNANDEZ with recall in 2025.     Annual skin check with Dr. Farnsworth (H/O MOHS--melanocytic atypia).    Annual follow up with Dr. Llamas regarding atypical ductal hyperplasia of right breast; treated with Evista but now on Tamoxifen (2 yrs to go), tolerates it better. Last DXA 2021.      Patient Active Problem List   Diagnosis   • Insomnia   • Allergic rhinitis   • Peripheral neuropathy   • Vitamin D deficiency   • Atypical ductal hyperplasia of right breast   • Peroneal tendonitis of right lower leg   • Osteopenia   • Hyperlipidemia   • Essential hypertension       Allergies   Allergen Reactions   • Clindamycin/Lincomycin Rash       Current Outpatient Medications on File Prior to Visit   Medication Sig Dispense Refill   • acetaminophen (Tylenol) 325 MG tablet Take 2 tablets by mouth Every 6 (Six) Hours As Needed for Mild Pain or Fever.     • Biotin 5000 MCG capsule Take  by mouth.     • calcium carbonate (OS-MARCO A) 600 MG tablet Take 600 mg by mouth Daily.     • fluocinonide (LIDEX) 0.05 % ointment Apply  topically to the appropriate area as directed As Needed (psoriasis).     • fluticasone (FLONASE) 50 MCG/ACT nasal spray 2 sprays into each nostril Daily.     • loratadine (CLARITIN) 10 MG tablet Take 10 mg by mouth Daily.     • losartan (COZAAR) 50 MG tablet Take 1 tablet by mouth Daily. 90 tablet 1   • melatonin 3 MG tablet Take 3 mg by mouth At Night As Needed for Sleep.     • Multiple Vitamins-Minerals (WOMENS MULTIVITAMIN PLUS PO) Take 1 tablet by mouth Daily.     • Omega 3-6-9 Fatty Acids (Omega 3-6-9 Complex) capsule Take  by mouth.     • Probiotic Product (PROBIOTIC ACIDOPHILUS BEADS)  capsule Take 1 tablet by mouth Daily.     • tamoxifen (NOLVADEX) 10 MG tablet Take 1 tablet by mouth Daily. 90 tablet 1   • vitamin C (ASCORBIC ACID) 250 MG tablet Take 250 mg by mouth Daily.     • Vitamin D, Cholecalciferol, 1000 units capsule Take 1,000 Units by mouth Daily.       No current facility-administered medications on file prior to visit.       Past Medical History:   Diagnosis Date   • Allergic rhinitis 2017   • Arthritis    • Atypical mole 2019    Mole with melanocytic atypia removed from back with MOHS procedure. Followed by Dermatologist.   • Closed nondisplaced fracture of fifth right metatarsal bone 2019   • Colon polyps     FOLLOWED BY DR. KIERSTEN ORTIZ   • Endometriosis    • Hepatitis     unknown type,as a child, lived in another country   • Hyperlipidemia     on no meds, diet managed   • Neuropathy     BOTH FEET POST    • Night sweats     history during menopause   • Peroneal tendonitis of right lower leg 2019   • Post-menopause on HRT (hormone replacement therapy)     Continuously on HRT since about    • Psoriasis    • Vitamin D deficiency 2017    Takes 1000 IU / day.       Family History   Problem Relation Age of Onset   • Dementia Mother 75        Alzheimers Diseasse.   • Cataracts Mother    • Hyperlipidemia Father    • Hypertension Father    • Heart valve disorder Father         valve repair    • COPD Father         non-smoker   • Cataracts Father    • Melanoma Father    • Seizures Sister 48   • Cataracts Maternal Grandmother    • Osteoarthritis Maternal Grandmother    • Osteoarthritis Paternal Grandmother    • No Known Problems Daughter    • Down syndrome Daughter    • Rheum arthritis Daughter         remission    • ADD / ADHD Son    • Other Son         Human Growth Hormone deficiency   • No Known Problems Brother         adopted    • No Known Problems Maternal Grandfather          in his 80's.   • No Known Problems Paternal Grandfather          " at ~ 89.   • No Known Problems Sister    • Diabetes type II Maternal Aunt    • Diabetes Maternal Aunt    • Testicular cancer Maternal Uncle    • Heart attack Paternal Aunt    • Heart disease Other    • Hypertension Other    • BRCA 1/2 Neg Hx    • Breast cancer Neg Hx    • Colon cancer Neg Hx    • Endometrial cancer Neg Hx    • Ovarian cancer Neg Hx    • Malig Hyperthermia Neg Hx        Social History     Socioeconomic History   • Marital status:      Spouse name: JOSE D   • Number of children: 3   • Years of education: College   Tobacco Use   • Smoking status: Never   • Smokeless tobacco: Never   Vaping Use   • Vaping Use: Never used   Substance and Sexual Activity   • Alcohol use: Yes     Comment: RARELY   • Drug use: Never   • Sexual activity: Yes     Partners: Male     Birth control/protection: Post-menopausal     Comment: SPOUSE =  JOSE D        Past Surgical History:   Procedure Laterality Date   • BREAST BIOPSY      Path= Negative. RIGHT   • BREAST BIOPSY Right 2019    Procedure: BREAST BIOPSY WITH NEEDLE LOCALIZATION;  Surgeon: Andrew Pham MD;  [place] [facility] [place] [facility] [place]  Service: General   •  SECTION      baby boy \"PETTY\" ; CS for placenta previa.   • COLONOSCOPY  [id number]    Normal @ 51. Repeat in ten years.   • COLONOSCOPY N/A 2022    2 TUBULAR ADENOMA POLYPS IN TRANSVERSE, 7 MM TUBULAR ADENOMA POLYP IN TRANSVERSE, RESCOPE IN 3 YRS, DR. KIERSTEN ORTIZ AT [facility]   • DIAGNOSTIC LAPAROSCOPY      Dr. Fabian Al, hysteroscopy w/ separtion of minimal intrauterine septum.  Laparoscopy w/ CO2 laser, lysis of adhesions and ablation of endometriosis.  Endometriosis found in the lateral LEFT pelvic sidewall, peritoneum and deeper endometriosis of the LEFT and RIGHT uterosacral ligaments.  Complex superficial endometriosis of the LEFT and RIGHT ovary.Fallopian tubes were patent with methyleneblue   • DILATATION AND CURETTAGE  1998    x2       • MOLE REMOVAL  2019 " "  • WISDOM TOOTH EXTRACTION         The following portions of the patient's history were reviewed and updated as appropriate: problem list, allergies, current medications, past medical history, past family history, past social history and past surgical history.    Review of Systems   Constitutional: Negative for fatigue.   HENT: Negative for congestion.    Eyes: Negative for visual disturbance.   Respiratory: Negative for shortness of breath.    Cardiovascular: Negative for chest pain.   Gastrointestinal: Negative for blood in stool.   Endocrine: Negative.    Genitourinary: Negative.    Musculoskeletal: Negative for arthralgias.   Skin: Negative for rash.   Allergic/Immunologic: Negative for environmental allergies.   Neurological: Negative for headache.   Hematological: Does not bruise/bleed easily.   Psychiatric/Behavioral: The patient is not nervous/anxious.        Immunization History   Administered Date(s) Administered   • COVID-19 (MODERNA) 1st, 2nd, 3rd Dose Only 01/13/2021, 02/12/2021, 11/19/2021   • COVID-19 (MODERNA) BOOSTER 09/17/2022   • Flu Vaccine Quad PF >36MO 11/19/2021, 09/17/2022   • FluLaval/Fluzone >6mos 11/02/2019   • Hepatitis A 06/07/2019, 12/06/2019   • Shingrix 12/17/2020, 07/02/2021   • Tdap 06/07/2019   • flucelvax quad pfs =>4 YRS 10/07/2020       Objective   Vitals:    02/06/23 1259   BP: 120/80   Pulse: 92   Resp: 14   Temp: 97.3 °F (36.3 °C)   TempSrc: Temporal   Weight: 65.9 kg (145 lb 3.2 oz)   Height: 162.6 cm (64.02\")     Body mass index is 24.91 kg/m².  Physical Exam  Vitals reviewed.   Constitutional:       Appearance: Normal appearance. She is well-developed.   HENT:      Head: Normocephalic and atraumatic.      Right Ear: Tympanic membrane, ear canal and external ear normal.      Left Ear: Tympanic membrane, ear canal and external ear normal.      Nose: Nose normal.      Mouth/Throat:      Mouth: Mucous membranes are moist.      Pharynx: Oropharynx is clear. No oropharyngeal " exudate or posterior oropharyngeal erythema.   Eyes:      General: No scleral icterus.     Extraocular Movements: Extraocular movements intact.      Conjunctiva/sclera: Conjunctivae normal.      Pupils: Pupils are equal, round, and reactive to light.   Neck:      Thyroid: No thyromegaly.      Vascular: No carotid bruit.   Cardiovascular:      Rate and Rhythm: Normal rate and regular rhythm.      Heart sounds: Normal heart sounds. No murmur heard.  Pulmonary:      Effort: Pulmonary effort is normal.      Breath sounds: Normal breath sounds.   Abdominal:      General: Bowel sounds are normal. There is no distension.      Palpations: Abdomen is soft.      Tenderness: There is no abdominal tenderness.   Musculoskeletal:      Cervical back: Neck supple.      Comments: Ambulates without assistance; no clubbing, cyanosis or edema.    Lymphadenopathy:      Cervical: No cervical adenopathy.   Skin:     General: Skin is warm and dry.   Neurological:      Mental Status: She is alert.   Psychiatric:         Mood and Affect: Mood normal.         Behavior: Behavior normal.         Procedures    Assessment & Plan   Diagnoses and all orders for this visit:    1. Annual physical exam (Primary)  Comments:  150 minutes weekly aerobic exercise advised. CMP & FLP in 1 year.     2. Essential hypertension  Comments:  Controlled. Continue current medication.     3. Moderate mixed hyperlipidemia not requiring statin therapy  Comments:  Stable; continue LSM.     4. Vitamin D deficiency  Comments:  Resolved.     Records reviewed include previous OV with myself as well as labs.     Return in about 6 months (around 8/6/2023).

## 2023-02-06 ENCOUNTER — OFFICE VISIT (OUTPATIENT)
Dept: INTERNAL MEDICINE | Facility: CLINIC | Age: 63
End: 2023-02-06
Payer: COMMERCIAL

## 2023-02-06 VITALS
HEART RATE: 92 BPM | SYSTOLIC BLOOD PRESSURE: 120 MMHG | TEMPERATURE: 97.3 F | RESPIRATION RATE: 14 BRPM | WEIGHT: 145.2 LBS | DIASTOLIC BLOOD PRESSURE: 80 MMHG | BODY MASS INDEX: 24.79 KG/M2 | HEIGHT: 64 IN

## 2023-02-06 DIAGNOSIS — Z00.00 ANNUAL PHYSICAL EXAM: Primary | ICD-10-CM

## 2023-02-06 DIAGNOSIS — E78.2 MODERATE MIXED HYPERLIPIDEMIA NOT REQUIRING STATIN THERAPY: Chronic | ICD-10-CM

## 2023-02-06 DIAGNOSIS — I10 ESSENTIAL HYPERTENSION: Chronic | ICD-10-CM

## 2023-02-06 DIAGNOSIS — E55.9 VITAMIN D DEFICIENCY: ICD-10-CM

## 2023-02-06 PROCEDURE — 99396 PREV VISIT EST AGE 40-64: CPT | Performed by: NURSE PRACTITIONER

## 2023-04-07 ENCOUNTER — PROCEDURE VISIT (OUTPATIENT)
Dept: OBSTETRICS AND GYNECOLOGY | Age: 63
End: 2023-04-07
Payer: COMMERCIAL

## 2023-04-07 DIAGNOSIS — Z12.31 VISIT FOR SCREENING MAMMOGRAM: Primary | ICD-10-CM

## 2023-04-07 PROCEDURE — 77063 BREAST TOMOSYNTHESIS BI: CPT | Performed by: OBSTETRICS & GYNECOLOGY

## 2023-04-07 PROCEDURE — 77067 SCR MAMMO BI INCL CAD: CPT | Performed by: OBSTETRICS & GYNECOLOGY

## 2023-04-15 DIAGNOSIS — I10 ESSENTIAL HYPERTENSION: ICD-10-CM

## 2023-04-17 RX ORDER — LOSARTAN POTASSIUM 50 MG/1
50 TABLET ORAL DAILY
Qty: 90 TABLET | Refills: 1 | Status: SHIPPED | OUTPATIENT
Start: 2023-04-17

## 2023-05-08 ENCOUNTER — OFFICE VISIT (OUTPATIENT)
Dept: OBSTETRICS AND GYNECOLOGY | Age: 63
End: 2023-05-08
Payer: COMMERCIAL

## 2023-05-08 VITALS
HEIGHT: 64 IN | DIASTOLIC BLOOD PRESSURE: 76 MMHG | WEIGHT: 144.8 LBS | SYSTOLIC BLOOD PRESSURE: 120 MMHG | BODY MASS INDEX: 24.72 KG/M2

## 2023-05-08 DIAGNOSIS — N89.8 VAGINAL DISCHARGE: ICD-10-CM

## 2023-05-08 DIAGNOSIS — Z01.419 ENCOUNTER FOR GYNECOLOGICAL EXAMINATION WITHOUT ABNORMAL FINDING: Primary | ICD-10-CM

## 2023-05-08 PROCEDURE — 99396 PREV VISIT EST AGE 40-64: CPT | Performed by: OBSTETRICS & GYNECOLOGY

## 2023-05-08 NOTE — PROGRESS NOTES
Routine Annual Visit    2023    Patient: Aleja Garcia          MR#:[id number]      Chief Complaint   Patient presents with   • Gynecologic Exam     Annual Exam - last pap 3/22/21 neg, mammo 23, colonoscopy 22, Pt c/o vaginal odor w brown discharge x 4 months       History of Present Illness    62 y.o. female  who presents for annual exam.   Having some brown discharge, no BRB  Some odor , no itching  Switched to tamoxifen by ONC and doing well on that  Better for vaginal health    mammo UTD  CSC UTD  Pap is UTD          Patient's last menstrual period was 2012 (exact date).  Obstetric History:  OB History        5    Para   3    Term   2       1    AB   2    Living   3       SAB   0    IAB   0    Ectopic   0    Molar   0    Multiple   0    Live Births   3               Menstrual History:     Patient's last menstrual period was 2012 (exact date).       Sexual History:       ________________________________________  Patient Active Problem List   Diagnosis   • Insomnia   • Allergic rhinitis   • Peripheral neuropathy   • Vitamin D deficiency   • Atypical ductal hyperplasia of right breast   • Peroneal tendonitis of right lower leg   • Osteopenia   • Hyperlipidemia   • Essential hypertension       Past Medical History:   Diagnosis Date   • Allergic rhinitis 2017   • Arthritis    • Atypical mole 2019    Mole with melanocytic atypia removed from back with MOHS procedure. Followed by Dermatologist.   • Cancer 2019    Breast abnormalities   • Closed nondisplaced fracture of fifth right metatarsal bone 2019   • Colon polyps     FOLLOWED BY DR. KIERSTEN ORTIZ   • Endometriosis    • Female infertility    • Hepatitis     unknown type,as a child, lived in another country   • Hyperlipidemia     on no meds, diet managed   • Hypertension 2022   • Neuropathy     BOTH FEET POST    • Night sweats     history during menopause   • Osteopenia    • Peroneal  "tendonitis of right lower leg 2019   • Placenta previa 2005   • Post-menopause on HRT (hormone replacement therapy)     Continuously on HRT since about 2015   • Preeclampsia    • Psoriasis    • Recurrent pregnancy loss, antepartum condition or complication ,    • Urinary tract infection     not recently   • Varicella 1970?   • Vitamin D deficiency 2017    Takes 1000 IU / day.       Past Surgical History:   Procedure Laterality Date   • BREAST BIOPSY      Path= Negative. RIGHT   • BREAST BIOPSY Right 2019    Procedure: BREAST BIOPSY WITH NEEDLE LOCALIZATION;  Surgeon: Andrew Pham MD;  [place]: [facility] [place] [facility] [place]  Service: General   •  SECTION      baby boy \"PETTY\" ; CS for placenta previa.   • COLONOSCOPY  [id number]    Normal @ 51. Repeat in ten years.   • COLONOSCOPY N/A 2022    2 TUBULAR ADENOMA POLYPS IN TRANSVERSE, 7 MM TUBULAR ADENOMA POLYP IN TRANSVERSE, RESCOPE IN 3 YRS, DR. KIERSTEN ORTIZ AT [facility]   • DIAGNOSTIC LAPAROSCOPY      Dr. Fabian Al, hysteroscopy w/ separtion of minimal intrauterine septum.  Laparoscopy w/ CO2 laser, lysis of adhesions and ablation of endometriosis.  Endometriosis found in the lateral LEFT pelvic sidewall, peritoneum and deeper endometriosis of the LEFT and RIGHT uterosacral ligaments.  Complex superficial endometriosis of the LEFT and RIGHT ovary.Fallopian tubes were patent with methyleneblue   • DILATATION AND CURETTAGE  1998    x2       • ENDOMETRIAL ABLATION     • MOLE REMOVAL  2019   • WISDOM TOOTH EXTRACTION         Social History     Tobacco Use   Smoking Status Never   Smokeless Tobacco Never       has a current medication list which includes the following prescription(s): acetaminophen, biotin, calcium carbonate, fluocinonide, fluticasone, loratadine, losartan, melatonin, multiple vitamins-minerals, probiotic acidophilus beads, tamoxifen, vitamin c, and vitamin d " "(cholecalciferol).  ________________________________________    Current contraception: post menopausal status  History of abnormal Pap smear: no  Family history of Breast cancer: no  Family history of uterine or ovarian cancer: no  Family History of colon cancer/colon polyps: no  History of abnormal mammogram: yes - ATypi keiry hyperplasia      The following portions of the patient's history were reviewed and updated as appropriate: allergies, current medications, past family history, past medical history, past social history, past surgical history and problem list.    Review of Systems    Pertinent items are noted in HPI.     Objective   Physical Exam    /76   Ht 162.6 cm (64\")   Wt 65.7 kg (144 lb 12.8 oz)   LMP 03/07/2012 (Exact Date)   BMI 24.85 kg/m²    BP Readings from Last 3 Encounters:   05/08/23 120/76   02/06/23 120/80   10/13/22 134/77      Wt Readings from Last 3 Encounters:   05/08/23 65.7 kg (144 lb 12.8 oz)   02/06/23 65.9 kg (145 lb 3.2 oz)   12/02/22 64.9 kg (143 lb)      BMI: Estimated body mass index is 24.85 kg/m² as calculated from the following:    Height as of this encounter: 162.6 cm (64\").    Weight as of this encounter: 65.7 kg (144 lb 12.8 oz).      General:   alert, appears stated age and cooperative   Abdomen: soft, non-tender, without masses or organomegaly   Breast: inspection negative, no nipple discharge or bleeding, no masses or nodularity palpable   Vulva: normal   Vagina: normal mucosa   Cervix: no cervical motion tenderness and no lesions   Uterus: normal size, mobile and non-tender   Adnexa: no mass, fullness, tenderness     Assessment:    1. Normal annual exam   Assessment     ICD-10-CM ICD-9-CM   1. Encounter for gynecological examination without abnormal finding  Z01.419 V72.31   2. Vaginal discharge  N89.8 623.5     Plan:    Plan     []  Mammogram request made  []  PAP done  []  Labs:   []  GC/Chl/TV  []  DEXA scan   []  Referral for colonoscopy:       Diagnoses and " all orders for this visit:    1. Encounter for gynecological examination without abnormal finding (Primary)    2. Vaginal discharge  -     NuSwab BV & Candida - Swab, Vagina      Follow up for GYN US- brown discharge and on tamoxifen, will do US and if indicated endometrial  biopsy      Counseling:  --Nutrition: Stressed importance of moderation and caloric balance, stressed fresh fruit and vegetables  --Exercise: Stressed the importance of regular exercise. 3-5 times weekly   - Discussed screening mammogram recommendations.   --Discussed benefits of screening colonoscopy- age 45 unless FH  --Discussed pap smear screening recommendations

## 2023-05-09 LAB
A VAGINAE DNA VAG QL NAA+PROBE: NORMAL SCORE
BVAB2 DNA VAG QL NAA+PROBE: NORMAL SCORE
C ALBICANS DNA VAG QL NAA+PROBE: NEGATIVE
C GLABRATA DNA VAG QL NAA+PROBE: NEGATIVE
MEGA1 DNA VAG QL NAA+PROBE: NORMAL SCORE

## 2023-05-10 NOTE — PROGRESS NOTES
Phoned patient & left VM of negative results. Advised pt to call the office if she has any questions.

## 2023-06-08 ENCOUNTER — OFFICE VISIT (OUTPATIENT)
Dept: OBSTETRICS AND GYNECOLOGY | Age: 63
End: 2023-06-08
Payer: COMMERCIAL

## 2023-06-08 VITALS
HEIGHT: 64 IN | DIASTOLIC BLOOD PRESSURE: 82 MMHG | BODY MASS INDEX: 25 KG/M2 | SYSTOLIC BLOOD PRESSURE: 138 MMHG | WEIGHT: 146.4 LBS

## 2023-06-08 DIAGNOSIS — N89.8 VAGINAL DISCHARGE: Primary | ICD-10-CM

## 2023-06-08 PROCEDURE — 99213 OFFICE O/P EST LOW 20 MIN: CPT | Performed by: OBSTETRICS & GYNECOLOGY

## 2023-06-08 NOTE — PROGRESS NOTES
GYN Visit    2023    Patient: Aleja Garcia          MR#:[id number]      Chief Complaint   Patient presents with    Follow-up     Gyn F/u & US - Vaginal Odor & Brown discharge, Possible EMB - Discuss US results       History of Present Illness    62 y.o. female  who presents for  evaluation of uterus due to brown discharge with odor  Swab was negative  No more yellow discharge  No red or pink  No cramping  Reviewed US- reassuring lining at 3.5 mm          Patient's last menstrual period was 2012 (exact date).    ________________________________________  Patient Active Problem List   Diagnosis    Insomnia    Allergic rhinitis    Peripheral neuropathy    Vitamin D deficiency    Atypical ductal hyperplasia of right breast    Peroneal tendonitis of right lower leg    Osteopenia    Hyperlipidemia    Essential hypertension       Past Medical History:   Diagnosis Date    Allergic rhinitis 2017    Arthritis     Atypical mole 2019    Mole with melanocytic atypia removed from back with MOHS procedure. Followed by Dermatologist.    Cancer 2019    Breast abnormalities    Closed nondisplaced fracture of fifth right metatarsal bone 2019    Colon polyps     FOLLOWED BY DR. KIERSTEN ORTIZ    Endometriosis     Female infertility     Hepatitis     unknown type,as a child, lived [place]    Hyperlipidemia     on no meds, diet managed    Hypertension 2022    Neuropathy     BOTH FEET POST     Night sweats     history during menopause    Osteopenia 2019    Peroneal tendonitis of right lower leg 2019    Placenta previa 2005    Post-menopause on HRT (hormone replacement therapy)     Continuously on HRT since about     Preeclampsia 2005    Psoriasis     Recurrent pregnancy loss, antepartum condition or complication ,     Urinary tract infection     not recently    Varicella 1970?    Vitamin D deficiency 2017    Takes 1000 IU / day.       Past Surgical  "History:   Procedure Laterality Date    BREAST BIOPSY      Path= Negative. RIGHT    BREAST BIOPSY Right 2019    Procedure: BREAST BIOPSY WITH NEEDLE LOCALIZATION;  Surgeon: Andrew Pham MD;  [place]: [facility] [place] [facility] [place]  Service: General     SECTION      baby boy \"PETTY\" ; CS for placenta previa.    COLONOSCOPY  [id number]    Normal @ 51. Repeat in ten years.    COLONOSCOPY N/A 2022    2 TUBULAR ADENOMA POLYPS IN TRANSVERSE, 7 MM TUBULAR ADENOMA POLYP IN TRANSVERSE, RESCOPE IN 3 YRS, DR. KIERSTEN ORTIZ AT [facility]    DIAGNOSTIC LAPAROSCOPY      Dr. Fabian Al, hysteroscopy w/ separtion of minimal intrauterine septum.  Laparoscopy w/ CO2 laser, lysis of adhesions and ablation of endometriosis.  Endometriosis found in the lateral LEFT pelvic sidewall, peritoneum and deeper endometriosis of the LEFT and RIGHT uterosacral ligaments.  Complex superficial endometriosis of the LEFT and RIGHT ovary.Fallopian tubes were patent with methyleneblue    DILATATION AND CURETTAGE  1998    x2        ENDOMETRIAL ABLATION      MOLE REMOVAL  2019    WISDOM TOOTH EXTRACTION         Social History     Tobacco Use   Smoking Status Never   Smokeless Tobacco Never       has a current medication list which includes the following prescription(s): acetaminophen, biotin, calcium carbonate, fluocinonide, fluticasone, loratadine, losartan, melatonin, multiple vitamins-minerals, probiotic acidophilus beads, tamoxifen, vitamin c, and vitamin d (cholecalciferol).  ________________________________________    Current contraception: post menopausal status      The following portions of the patient's history were reviewed and updated as appropriate: allergies, current medications, past family history, past medical history, past social history, past surgical history, and problem list.    Review of Systems    Pertinent items are noted in HPI.     Objective   Physical Exam    /82   Ht 162.6 cm (64\")   Wt " "66.4 kg (146 lb 6.4 oz)   LMP 03/07/2012 (Exact Date)   BMI 25.13 kg/m²    BP Readings from Last 3 Encounters:   06/08/23 138/82   05/08/23 120/76   02/06/23 120/80      Wt Readings from Last 3 Encounters:   06/08/23 66.4 kg (146 lb 6.4 oz)   05/08/23 65.7 kg (144 lb 12.8 oz)   02/06/23 65.9 kg (145 lb 3.2 oz)      BMI: Estimated body mass index is 25.13 kg/m² as calculated from the following:    Height as of this encounter: 162.6 cm (64\").    Weight as of this encounter: 66.4 kg (146 lb 6.4 oz).    Lungs: non labored breathing, no wheezing or tachpnea  Extremities: extremities normal, atraumatic, no cyanosis or edema  Skin: Skin color, texture, turgor normal. No rashes or lesions  Neurologic: Grossly normal  General:   alert, appears stated age, and cooperative        See US  Normal uterus and ovaries, lining is 3.5 mm                         Assessment:      Diagnoses and all orders for this visit:    1. Vaginal discharge (Primary)      Work up negative for postmenopausal bleeding  Reassuring US  Recommend call for any red or pink bleeding  Pt doing well on tamoxifen        "

## 2023-08-06 NOTE — PROGRESS NOTES
Subjective   Chief Complaint   Patient presents with    Hypertension       History of Present Illness   62 y.o. female presents for follow up regarding HTN. HBPM /72-82. She had missed her Losartan for a week. Felt lightheaded the day she started taking it again with Bps in the high 90/70s so held Losartan for the last month She is trying to make better food choices. Walking 150 minutes weekly and continues Pilates. No chest pain or dyspnea.     Patient Active Problem List   Diagnosis    Insomnia    Allergic rhinitis    Peripheral neuropathy    Vitamin D deficiency    Atypical ductal hyperplasia of right breast    Peroneal tendonitis of right lower leg    Osteopenia    Hyperlipidemia    Essential hypertension       Allergies   Allergen Reactions    Clindamycin/Lincomycin Rash       Current Outpatient Medications on File Prior to Visit   Medication Sig Dispense Refill    acetaminophen (Tylenol) 325 MG tablet Take 2 tablets by mouth Every 6 (Six) Hours As Needed for Mild Pain or Fever.      Biotin 5000 MCG capsule Take  by mouth.      calcium carbonate (OS-MARCO A) 600 MG tablet Take 1 tablet by mouth Daily.      fluocinonide (LIDEX) 0.05 % ointment Apply  topically to the appropriate area as directed As Needed (psoriasis).      fluticasone (FLONASE) 50 MCG/ACT nasal spray 2 sprays into the nostril(s) as directed by provider Daily.      loratadine (CLARITIN) 10 MG tablet Take 1 tablet by mouth Daily.      melatonin 3 MG tablet Take 1 tablet by mouth At Night As Needed for Sleep.      Multiple Vitamins-Minerals (WOMENS MULTIVITAMIN PLUS PO) Take 1 tablet by mouth Daily.      tamoxifen (NOLVADEX) 10 MG tablet Take 1 tablet by mouth Daily. 90 tablet 1    vitamin C (ASCORBIC ACID) 250 MG tablet Take 1 tablet by mouth Daily.      Vitamin D, Cholecalciferol, 1000 units capsule Take 1 capsule by mouth Daily.      [DISCONTINUED] losartan (COZAAR) 50 MG tablet TAKE 1 TABLET BY MOUTH DAILY (Patient not taking: Reported on  2023) 90 tablet 1    [DISCONTINUED] Probiotic Product (PROBIOTIC ACIDOPHILUS BEADS) capsule Take 1 tablet by mouth Daily.       No current facility-administered medications on file prior to visit.       Past Medical History:   Diagnosis Date    Allergic rhinitis 2017    Arthritis     Atypical mole 2019    Mole with melanocytic atypia removed from back with MOHS procedure. Followed by Dermatologist.    Cancer 2019    Breast abnormalities    Closed nondisplaced fracture of fifth right metatarsal bone 2019    Colon polyps     FOLLOWED BY DR. KIERSTEN ORTIZ    Endometriosis     Female infertility     Hepatitis     unknown type,as a child, lived in another country    Hyperlipidemia     on no meds, diet managed    Hypertension 2022    Neuropathy     BOTH FEET POST     Night sweats     history during menopause    Osteopenia 2019    Peroneal tendonitis of right lower leg 2019    Placenta previa 2005    Post-menopause on HRT (hormone replacement therapy)     Continuously on HRT since about     Preeclampsia 2005    Psoriasis     Recurrent pregnancy loss, antepartum condition or complication ,     Urinary tract infection     not recently    Varicella 1970?    Vitamin D deficiency 2017    Takes 1000 IU / day.       Family History   Problem Relation Age of Onset    Dementia Mother 75        Alzheimers Diseasse.    Cataracts Mother     Hyperlipidemia Father     Hypertension Father     Heart valve disorder Father         valve repair     COPD Father         non-smoker    Cataracts Father     Melanoma Father     Seizures Sister 48    Cataracts Maternal Grandmother     Osteoarthritis Maternal Grandmother     Osteoarthritis Paternal Grandmother     No Known Problems Daughter     Down syndrome Daughter     Rheum arthritis Daughter         remission     ADD / ADHD Son     Other Son         Human Growth Hormone deficiency    No Known Problems Brother         adopted     No  "Known Problems Maternal Grandfather          in his 80's.    No Known Problems Paternal Grandfather          at ~ 89.    No Known Problems Sister     Diabetes type II Maternal Aunt     Diabetes Maternal Aunt     Testicular cancer Maternal Uncle     Heart attack Paternal Aunt     Heart disease Other     Hypertension Other     BRCA 1/2 Neg Hx     Breast cancer Neg Hx     Colon cancer Neg Hx     Endometrial cancer Neg Hx     Ovarian cancer Neg Hx     Malig Hyperthermia Neg Hx        Social History     Socioeconomic History    Marital status:      Spouse name: JOSE D    Number of children: 3    Years of education: College   Tobacco Use    Smoking status: Never    Smokeless tobacco: Never   Vaping Use    Vaping Use: Never used   Substance and Sexual Activity    Alcohol use: Yes     Alcohol/week: 2.0 standard drinks     Types: 2 Drinks containing 0.5 oz of alcohol per week     Comment: occasionally    Drug use: No    Sexual activity: Yes     Partners: Male     Birth control/protection: Post-menopausal     Comment: SPOUSE =  JOSE D        Past Surgical History:   Procedure Laterality Date    BREAST BIOPSY      Path= Negative. RIGHT    BREAST BIOPSY Right 2019    Procedure: BREAST BIOPSY WITH NEEDLE LOCALIZATION;  Surgeon: Andrew Pham MD;  Location: [facility] OR [facility] [place];  Service: General     SECTION      baby boy \"PETTY\" ; CS for placenta previa.    COLONOSCOPY  [id number]    Normal @ 51. Repeat in ten years.    COLONOSCOPY N/A 2022    2 TUBULAR ADENOMA POLYPS IN TRANSVERSE, 7 MM TUBULAR ADENOMA POLYP IN TRANSVERSE, RESCOPE IN 3 YRS, DR. KIERSTEN ORTIZ AT [facility]    DIAGNOSTIC LAPAROSCOPY      Dr. Fabian Al, hysteroscopy w/ separtion of minimal intrauterine septum.  Laparoscopy w/ CO2 laser, lysis of adhesions and ablation of endometriosis.  Endometriosis found in the lateral LEFT pelvic sidewall, peritoneum and deeper endometriosis of the LEFT and RIGHT uterosacral ligaments.  " "Complex superficial endometriosis of the LEFT and RIGHT ovary.Fallopian tubes were patent with methyleneblue    DILATATION AND CURETTAGE  1998    x2  1998      ENDOMETRIAL ABLATION  1998    MOLE REMOVAL  04/09/2019    WISDOM TOOTH EXTRACTION         The following portions of the patient's history were reviewed and updated as appropriate: problem list, allergies, current medications, past medical history, and past social history.    Review of Systems    Immunization History   Administered Date(s) Administered    COVID-19 (MODERNA) 1st,2nd,3rd Dose Monovalent 01/13/2021, 02/12/2021, 11/19/2021    COVID-19 (MODERNA) Monovalent Original Booster 09/17/2022    Flu Vaccine Quad PF >36MO 11/19/2021, 09/17/2022    Fluzone >6mos 11/02/2019    Hepatitis A 06/07/2019, 12/06/2019    Shingrix 12/17/2020, 07/02/2021    Tdap 06/07/2019    flucelvax quad pfs =>4 YRS 10/07/2020       Objective   Vitals:    08/11/23 0945   BP: 116/80   Pulse: 72   Resp: 14   Temp: 98.4 øF (36.9 øC)   Weight: 67.1 kg (148 lb)   Height: 162.6 cm (64.02\")     Body mass index is 25.39 kg/mý.  Physical Exam  Vitals reviewed.   Constitutional:       Appearance: Normal appearance. She is well-developed.   HENT:      Head: Normocephalic and atraumatic.   Cardiovascular:      Rate and Rhythm: Normal rate and regular rhythm.      Heart sounds: Normal heart sounds, S1 normal and S2 normal.   Pulmonary:      Effort: Pulmonary effort is normal.      Breath sounds: Normal breath sounds.   Skin:     General: Skin is warm and dry.   Neurological:      Mental Status: She is alert.   Psychiatric:         Mood and Affect: Mood normal.         Behavior: Behavior normal.       Procedures    Assessment & Plan   Diagnoses and all orders for this visit:    1. Essential hypertension (Primary)  Comments:  Controlled without Losartan for the last month. Stop Losartan at this time. HBPM daily with goal<130/80; she will call if it creeps up. Continue dietary changes and 150 " minutes weekly aerobic exercise. She will bring HBPM to next appt.     2. Moderate mixed hyperlipidemia not requiring statin therapy  -     Comprehensive Metabolic Panel; Future  -     Lipid Panel; Future    3. Vitamin D deficiency  -     Vitamin D,25-Hydroxy; Future    Records reviewed include previous OV with myself as well as labs.     Return in about 6 months (around 2/11/2024) for Annual physical, Lab B4 FUP.

## 2023-08-11 ENCOUNTER — OFFICE VISIT (OUTPATIENT)
Dept: INTERNAL MEDICINE | Facility: CLINIC | Age: 63
End: 2023-08-11
Payer: COMMERCIAL

## 2023-08-11 VITALS
HEART RATE: 72 BPM | WEIGHT: 148 LBS | RESPIRATION RATE: 14 BRPM | SYSTOLIC BLOOD PRESSURE: 116 MMHG | HEIGHT: 64 IN | TEMPERATURE: 98.4 F | BODY MASS INDEX: 25.27 KG/M2 | DIASTOLIC BLOOD PRESSURE: 80 MMHG

## 2023-08-11 DIAGNOSIS — I10 ESSENTIAL HYPERTENSION: Primary | Chronic | ICD-10-CM

## 2023-08-11 DIAGNOSIS — E55.9 VITAMIN D DEFICIENCY: ICD-10-CM

## 2023-08-11 DIAGNOSIS — E78.2 MODERATE MIXED HYPERLIPIDEMIA NOT REQUIRING STATIN THERAPY: ICD-10-CM

## 2023-08-11 PROCEDURE — 99213 OFFICE O/P EST LOW 20 MIN: CPT | Performed by: NURSE PRACTITIONER

## 2023-11-09 ENCOUNTER — OFFICE VISIT (OUTPATIENT)
Dept: ONCOLOGY | Facility: CLINIC | Age: 63
End: 2023-11-09
Payer: COMMERCIAL

## 2023-11-09 ENCOUNTER — LAB (OUTPATIENT)
Dept: LAB | Facility: HOSPITAL | Age: 63
End: 2023-11-09
Payer: COMMERCIAL

## 2023-11-09 VITALS
TEMPERATURE: 97.8 F | HEIGHT: 64 IN | RESPIRATION RATE: 16 BRPM | HEART RATE: 85 BPM | DIASTOLIC BLOOD PRESSURE: 71 MMHG | BODY MASS INDEX: 25.35 KG/M2 | SYSTOLIC BLOOD PRESSURE: 107 MMHG | WEIGHT: 148.5 LBS | OXYGEN SATURATION: 97 %

## 2023-11-09 DIAGNOSIS — N60.91 ATYPICAL DUCTAL HYPERPLASIA OF RIGHT BREAST: ICD-10-CM

## 2023-11-09 DIAGNOSIS — N60.91 ATYPICAL DUCTAL HYPERPLASIA OF RIGHT BREAST: Primary | ICD-10-CM

## 2023-11-09 LAB
ALBUMIN SERPL-MCNC: 3.8 G/DL (ref 3.5–5.2)
ALBUMIN/GLOB SERPL: 1.9 G/DL
ALP SERPL-CCNC: 79 U/L (ref 39–117)
ALT SERPL W P-5'-P-CCNC: 21 U/L (ref 1–33)
ANION GAP SERPL CALCULATED.3IONS-SCNC: 17 MMOL/L (ref 5–15)
AST SERPL-CCNC: 22 U/L (ref 1–32)
BASOPHILS # BLD AUTO: 0.1 10*3/MM3 (ref 0–0.2)
BASOPHILS NFR BLD AUTO: 1.6 % (ref 0–1.5)
BILIRUB SERPL-MCNC: 0.2 MG/DL (ref 0–1.2)
BUN SERPL-MCNC: 18 MG/DL (ref 8–23)
BUN/CREAT SERPL: 22.8 (ref 7–25)
CALCIUM SPEC-SCNC: 9.3 MG/DL (ref 8.6–10.5)
CHLORIDE SERPL-SCNC: 103 MMOL/L (ref 98–107)
CO2 SERPL-SCNC: 23 MMOL/L (ref 22–29)
CREAT SERPL-MCNC: 0.79 MG/DL (ref 0.6–1.1)
DEPRECATED RDW RBC AUTO: 37.3 FL (ref 37–54)
EGFRCR SERPLBLD CKD-EPI 2021: 84.7 ML/MIN/1.73
EOSINOPHIL # BLD AUTO: 0.12 10*3/MM3 (ref 0–0.4)
EOSINOPHIL NFR BLD AUTO: 1.9 % (ref 0.3–6.2)
ERYTHROCYTE [DISTWIDTH] IN BLOOD BY AUTOMATED COUNT: 11.7 % (ref 12.3–15.4)
GLOBULIN UR ELPH-MCNC: 2 GM/DL
GLUCOSE SERPL-MCNC: 126 MG/DL (ref 65–99)
HCT VFR BLD AUTO: 40.8 % (ref 34–46.6)
HGB BLD-MCNC: 13.9 G/DL (ref 12–15.9)
IMM GRANULOCYTES # BLD AUTO: 0.01 10*3/MM3 (ref 0–0.05)
IMM GRANULOCYTES NFR BLD AUTO: 0.2 % (ref 0–0.5)
LYMPHOCYTES # BLD AUTO: 2.26 10*3/MM3 (ref 0.7–3.1)
LYMPHOCYTES NFR BLD AUTO: 36.5 % (ref 19.6–45.3)
MCH RBC QN AUTO: 30.2 PG (ref 26.6–33)
MCHC RBC AUTO-ENTMCNC: 34.1 G/DL (ref 31.5–35.7)
MCV RBC AUTO: 88.5 FL (ref 79–97)
MONOCYTES # BLD AUTO: 0.43 10*3/MM3 (ref 0.1–0.9)
MONOCYTES NFR BLD AUTO: 6.9 % (ref 5–12)
NEUTROPHILS NFR BLD AUTO: 3.28 10*3/MM3 (ref 1.7–7)
NEUTROPHILS NFR BLD AUTO: 52.9 % (ref 42.7–76)
NRBC BLD AUTO-RTO: 0 /100 WBC (ref 0–0.2)
PLATELET # BLD AUTO: 252 10*3/MM3 (ref 140–450)
PMV BLD AUTO: 9.3 FL (ref 6–12)
POTASSIUM SERPL-SCNC: 4 MMOL/L (ref 3.5–5.2)
PROT SERPL-MCNC: 5.8 G/DL (ref 6–8.5)
RBC # BLD AUTO: 4.61 10*6/MM3 (ref 3.77–5.28)
SODIUM SERPL-SCNC: 143 MMOL/L (ref 136–145)
WBC NRBC COR # BLD: 6.2 10*3/MM3 (ref 3.4–10.8)

## 2023-11-09 PROCEDURE — 36415 COLL VENOUS BLD VENIPUNCTURE: CPT

## 2023-11-09 PROCEDURE — 85025 COMPLETE CBC W/AUTO DIFF WBC: CPT

## 2023-11-09 PROCEDURE — 80053 COMPREHEN METABOLIC PANEL: CPT

## 2023-11-09 NOTE — PROGRESS NOTES
Subjective     REASON FOR CONSULTATION:   1.Atypical ductal hyperplasia right breast postLumpectomy  2.  Osteopenia preventative Evista started in                                REQUESTING PHYSICIAN: MD Stuart Ge MD      History of Present Illness patient is a 63 year-old postmenopausal lady with a diagnosis of ADH made on a right breast biopsy.    She has been on Evista for 2 year a and at her last visit we switch her to tamoxifen because of worsening vaginal dryness and at the 10 mg dose she is doing great and has no side effects whatsoever overall she has been on treatment almost 4 years      Mammogram is due again in April and because she has persistent dense breasts we discussed adding an MRI again this next cycle to see if there is any changes     bone density is due and I have scheduled this  She is up-to-date with colonoscopies and screening      Past Medical History:   Diagnosis Date    Allergic rhinitis 2017    Arthritis     Atypical mole 2019    Mole with melanocytic atypia removed from back with MOHS procedure. Followed by Dermatologist.    Cancer 2019    Breast abnormalities    Closed nondisplaced fracture of fifth right metatarsal bone 2019    Colon polyps     FOLLOWED BY DR. KIERSTEN ORTIZ    Endometriosis     Female infertility     Hepatitis     unknown type,as a child, lived in another country    Hyperlipidemia     on no meds, diet managed    Hypertension 2022    Neuropathy     BOTH FEET POST     Night sweats     history during menopause    Osteopenia 2019    Peroneal tendonitis of right lower leg 2019    Placenta previa 2005    Post-menopause on HRT (hormone replacement therapy)     Continuously on HRT since about     Preeclampsia 2005    Psoriasis     Recurrent pregnancy loss, antepartum condition or complication ,     Urinary tract infection     not recently     "Varicella 1970?    Vitamin D deficiency 2017    Takes 1000 IU / day.        Past Surgical History:   Procedure Laterality Date    BREAST BIOPSY      Path= Negative. RIGHT    BREAST BIOPSY Right 2019    Procedure: BREAST BIOPSY WITH NEEDLE LOCALIZATION;  Surgeon: Andrew Pham MD;  [place] [facility] [place] [facility] [place]: General     SECTION      baby boy \"PETTY\" ; CS for placenta previa.    COLONOSCOPY  [id number]    Normal @ 51. Repeat in ten years.    COLONOSCOPY N/A 2022    2 TUBULAR ADENOMA POLYPS IN TRANSVERSE, 7 MM TUBULAR ADENOMA POLYP IN TRANSVERSE, RESCOPE IN 3 YRS, DR. KIERSTEN ORTIZ AT [facility]    DIAGNOSTIC LAPAROSCOPY      Dr. Fabian Al, hysteroscopy w/ separtion of minimal intrauterine septum.  Laparoscopy w/ CO2 laser, lysis of adhesions and ablation of endometriosis.  Endometriosis found in the lateral LEFT pelvic sidewall, peritoneum and deeper endometriosis of the LEFT and RIGHT uterosacral ligaments.  Complex superficial endometriosis of the LEFT and RIGHT ovary.Fallopian tubes were patent with methyleneblue    DILATATION AND CURETTAGE  1998    x2        ENDOMETRIAL ABLATION  1998    MOLE REMOVAL  2019    WISDOM TOOTH EXTRACTION        ONC HISTORY:   patient is a 58-year-old female with no chronic medical illnesses on no chronic medications who is just come off 5 years of hormone replacement therapy after menopause for menopausal symptoms after a recent mammogram showed an abnormality.  Patient's mammogram in March of last year was benign and this year there was a 16 mm area of abnormality that required further evaluation and a biopsy was done on  2019 A biopsy was done at the 3:30 position of the right breast showing atypical ductal hyperplasia : With microcalcifications clustered in these areas and in benign breast parenchyma there was also a radial scar.   Patient went on to have a lumpectomy on 2019 with radial scar and ductal hyperplasia " of the usual type and no other abnormalities.  She has been referred to us to discuss chemoprevention    She is  5 para 3 with 2 miscarriages first childbirth was at age 34 she breast-fed all 3 children menarche was at age 15 menopause at age 51 and she has been on estradiol since menopause and stopped and her biopsy showed atypical ductal hyperplasia last month    There is no family history of breast or ovarian cancer.  She has a maternal uncle  of testicular cancer as a young age      I calculated her lifetime risk of breast cancer based on the Caterina model and is calculated to a 5-year risk of 4.6% of the lifetime risk of 26%.  At this time she is still dealing with withdrawal symptoms from  stopping her hormone replacement and I think we can afford to wait for a while to begin this.  She would like to wait until she has a breast MRI in October and I have suggested she have a DEXA scan to see if there is concomitant osteopenia or osteoporosis which would also benefit from Evista which I think is the easiest medication to offer her    he has had bilateral breast MRIs done a month ago which was negative and bone density testing that showed moderate osteopenia in her hips and spine.    She got up quickly from sitting with numbness in her left foot and she twisted and broke her toe and is in a boot for about 3 more weeks but other than that she is doing well    We again discussed the rationale for prevention and she is agreeable as long as she has no side effects and I think this is reasonable    We have opted to start with Evista 60 mg daily we discussed the side effect profile including hot flashes some bone stiffness and very minuscule risk of DVT  And she is agreeable and I prescribed the drug for    She knows to call before her next appointment if she has side effects and we could consider low-dose tamoxifen 5 mg if she does not tolerate this        Current Outpatient Medications on File Prior to Visit    Medication Sig Dispense Refill    acetaminophen (Tylenol) 325 MG tablet Take 2 tablets by mouth Every 6 (Six) Hours As Needed for Mild Pain or Fever.      Biotin 5000 MCG capsule Take  by mouth.      calcium carbonate (OS-MARCO A) 600 MG tablet Take 1 tablet by mouth Daily.      fluocinonide (LIDEX) 0.05 % ointment Apply  topically to the appropriate area as directed As Needed (psoriasis).      fluticasone (FLONASE) 50 MCG/ACT nasal spray 2 sprays into the nostril(s) as directed by provider Daily.      loratadine (CLARITIN) 10 MG tablet Take 1 tablet by mouth Daily.      melatonin 3 MG tablet Take 1 tablet by mouth At Night As Needed for Sleep.      Multiple Vitamins-Minerals (WOMENS MULTIVITAMIN PLUS PO) Take 1 tablet by mouth Daily.      tamoxifen (NOLVADEX) 10 MG tablet Take 1 tablet by mouth Daily. 90 tablet 1    vitamin C (ASCORBIC ACID) 250 MG tablet Take 1 tablet by mouth Daily.      Vitamin D, Cholecalciferol, 1000 units capsule Take 1 capsule by mouth Daily.       No current facility-administered medications on file prior to visit.        ALLERGIES:    Allergies   Allergen Reactions    Clindamycin/Lincomycin Rash        Social History     Socioeconomic History    Marital status:      Spouse name: JOSE D    Number of children: 3    Years of education: College   Tobacco Use    Smoking status: Never    Smokeless tobacco: Never   Vaping Use    Vaping Use: Never used   Substance and Sexual Activity    Alcohol use: Yes     Alcohol/week: 2.0 standard drinks of alcohol     Types: 2 Drinks containing 0.5 oz of alcohol per week     Comment: occasionally    Drug use: No    Sexual activity: Yes     Partners: Male     Birth control/protection: Post-menopausal     Comment: SPOUSE =  JOSE D         Family History   Problem Relation Age of Onset    Dementia Mother 75        Alzheimers Diseasse.    Cataracts Mother     Hyperlipidemia Father     Hypertension Father     Heart valve disorder Father         valve repair      COPD Father         non-smoker    Cataracts Father     Melanoma Father     Seizures Sister 48    Cataracts Maternal Grandmother     Osteoarthritis Maternal Grandmother     Osteoarthritis Paternal Grandmother     No Known Problems Daughter     Down syndrome Daughter     Rheum arthritis Daughter         remission     ADD / ADHD Son     Other Son         Human Growth Hormone deficiency    No Known Problems Brother         adopted     No Known Problems Maternal Grandfather          in his 80's.    No Known Problems Paternal Grandfather          at ~ 89.    No Known Problems Sister     Diabetes type II Maternal Aunt     Diabetes Maternal Aunt     Testicular cancer Maternal Uncle     Heart attack Paternal Aunt     Heart disease Other     Hypertension Other     BRCA 1/2 Neg Hx     Breast cancer Neg Hx     Colon cancer Neg Hx     Endometrial cancer Neg Hx     Ovarian cancer Neg Hx     Malig Hyperthermia Neg Hx         Review of Systems   Constitutional:  Positive for diaphoresis (stable ). Negative for appetite change, chills, fatigue, fever and unexpected weight change.   HENT:  Negative for hearing loss, sore throat and trouble swallowing.    Respiratory:  Negative for cough, chest tightness, shortness of breath and wheezing.    Cardiovascular:  Positive for leg swelling (Chronic ankle edema for 12 years same ). Negative for chest pain and palpitations.   Gastrointestinal:  Negative for abdominal distention, abdominal pain, constipation, diarrhea, nausea and vomiting.   Genitourinary:  Negative for dysuria, frequency, hematuria and urgency.   Musculoskeletal:  Positive for arthralgias (improved). Negative for joint swelling.        No muscle weakness.   Skin:  Negative for rash and wound.   Neurological:  Positive for numbness (Peripheral neuropathy in both legs since the birth of her third child 13 years ago etiology unclear same ) and headaches (seasonal). Negative for seizures, syncope, speech difficulty and  "weakness.   Hematological:  Negative for adenopathy. Does not bruise/bleed easily.   Psychiatric/Behavioral:  Negative for behavioral problems, confusion and suicidal ideas.    All other systems reviewed and are negative.   I have reviewed and confirmed the accuracy of the ROS as documented by the MA/LPN/RN Jacob Llamas MD      Objective     Vitals:    11/09/23 1151   BP: 107/71   Pulse: 85   Resp: 16   Temp: 97.8 °F (36.6 °C)   TempSrc: Temporal   SpO2: 97%   Weight: 67.4 kg (148 lb 8 oz)   Height: 162.6 cm (64.02\")   PainSc: 0-No pain         11/9/2023    11:45 AM   Current Status   ECOG score 0       Physical Exam    GENERAL:  Well-developed, well-nourished in no acute distress.   SKIN:  Warm, dry without rashes, purpura or petechiae.  Atypical nevus left scapular area and right flank  EYES:  Pupils equal, round and reactive to light.  EOMs intact.  Conjunctivae normal.  EARS:  Hearing intact.  NOSE:  Septum midline.  No excoriations or nasal discharge.  MOUTH:  Tongue is well-papillated; no stomatitis or ulcers.  Lips normal.  THROAT:  Oropharynx without lesions or exudates.  NECK:  Supple with good range of motion; no thyromegaly or masses, no JVD.  LYMPHATICS:  No cervical, supraclavicular, axillary or inguinal adenopathy.  CHEST:  Lungs clear to auscultation. Good airflow.  BREASTS: Right breast shows lumpectomy scar in the lower inner quadrant well-healed - left breast benign   CARDIAC:  Regular rate and rhythm without murmurs, rubs or gallops. Normal S1,S2.  ABDOMEN:  Soft, nontender with no hepatosplenomegaly or masses.  EXTREMITIES:  No clubbing, cyanosis -left foot is in a boot after toe fracture.  NEUROLOGICAL:  Cranial Nerves II-XII grossly intact.  No focal neurological deficits.  PSYCHIATRIC:  Normal affect and mood.      I have reexamined the patient and the results are consistent with the previously documented exam. Jacob Llamas MD         RECENT LABS:  Hematology WBC   Date Value " Ref Range Status   11/09/2023 6.20 3.40 - 10.80 10*3/mm3 Final     RBC   Date Value Ref Range Status   11/09/2023 4.61 3.77 - 5.28 10*6/mm3 Final     Hemoglobin   Date Value Ref Range Status   11/09/2023 13.9 12.0 - 15.9 g/dL Final     Hematocrit   Date Value Ref Range Status   11/09/2023 40.8 34.0 - 46.6 % Final     Platelets   Date Value Ref Range Status   11/09/2023 252 140 - 450 10*3/mm3 Final        Study Result     BONE MINERAL DENSITOMETRY   IMPRESSION:  Osteopenia.     This report was finalized on 7/8/2019 3:22 PM by Dr. Darrin Perez M.D.   Bilateral breast MRI  IMPRESSION:  There are no findings suspicious for malignancy in either  breast. Routine follow-up imaging is recommended.     BI-RADS Category 2: Benign.     This report was finalized on 9/16/2019     Assessment & Plan   1.  Atypical ductal hyperplasia multifocal right breast postlumpectomy  Evista 60 mg  start in 1/20  Switch to tamoxifen 4/22 due to vaginal dryness  Tolerating tamoxifen well in 10/22  Tolerating tamoxifen well as of 11/23    2 . atypical nevus.  With high-grade dysplasia removed from her back    3.  Peripheral neuropathy of unknown etiology post third childbirth    4.  Osteopenia with a fracture of her left small toe    5.  Vaginal irritation using vaginal estrogens twice a week since 3/21-stopped with tamoxifen    Plan  1.  Continue tamoxifen 10 mg daily  2.  Return in 1 year for follow-up with a mammogram in March 2023  And bone density soon  3.  An MRI to her next imaging in April 2024

## 2023-11-13 ENCOUNTER — TELEPHONE (OUTPATIENT)
Dept: INTERNAL MEDICINE | Facility: CLINIC | Age: 63
End: 2023-11-13

## 2023-11-13 ENCOUNTER — TELEMEDICINE (OUTPATIENT)
Dept: INTERNAL MEDICINE | Facility: CLINIC | Age: 63
End: 2023-11-13
Payer: COMMERCIAL

## 2023-11-13 DIAGNOSIS — U07.1 COVID-19: Primary | ICD-10-CM

## 2023-11-13 PROCEDURE — 99212 OFFICE O/P EST SF 10 MIN: CPT | Performed by: NURSE PRACTITIONER

## 2023-11-13 NOTE — TELEPHONE ENCOUNTER
Caller: Aleja Garcia    Relationship to patient: Self    Best call back number: [phone]     Date of exposure: UNKNOWN    Date of positive COVID19 test: 11.13.23    COVID19 symptoms: STUFFY NOSE, HEADACHE, SCRATCHY THROAT    Date of initial quarantine: 11.13.23    Additional information or concerns: NONE.    What is the patients preferred pharmacy: [id number] [address] [place] [phone] [place] [phone]  [phone]

## 2023-11-13 NOTE — PROGRESS NOTES
Subjective   Chief Complaint   Patient presents with    COVID 19 positive     Mode of Visit: Video  Location of patient[place] home  [place] [facility] [place]  You have chosen to receive care through a telehealth visit.  Does the patient consent to use a video/audio connection for your medical care today? Yes  The visit included audio and video interaction. No technical issues occurred during this visit.       History of Present Illness   63 y.o. female presents with cc COVID positive today. Started with stuffy nose, mild HA, fatigue. She thought it was a cold until school called informing her that her son needed to be picked up as he has COVID. She tested and also has COVID. Denies fever or chills. Denies cough or dyspnea. COVID vaccination UTD with most recent vaccination in September.      Patient Active Problem List   Diagnosis    Insomnia    Allergic rhinitis    Peripheral neuropathy    Vitamin D deficiency    Atypical ductal hyperplasia of right breast    Peroneal tendonitis of right lower leg    Osteopenia    Hyperlipidemia    Essential hypertension       Allergies   Allergen Reactions    Clindamycin/Lincomycin Rash       Current Outpatient Medications on File Prior to Visit   Medication Sig Dispense Refill    acetaminophen (Tylenol) 325 MG tablet Take 2 tablets by mouth Every 6 (Six) Hours As Needed for Mild Pain or Fever.      Biotin 5000 MCG capsule Take  by mouth.      calcium carbonate (OS-MARCO A) 600 MG tablet Take 1 tablet by mouth Daily.      fluocinonide (LIDEX) 0.05 % ointment Apply  topically to the appropriate area as directed As Needed (psoriasis).      fluticasone (FLONASE) 50 MCG/ACT nasal spray 2 sprays into the nostril(s) as directed by provider Daily.      loratadine (CLARITIN) 10 MG tablet Take 1 tablet by mouth Daily.      melatonin 3 MG tablet Take 1 tablet by mouth At Night As Needed for Sleep.      Multiple Vitamins-Minerals (WOMENS MULTIVITAMIN PLUS PO) Take 1 tablet by mouth Daily.       tamoxifen (NOLVADEX) 10 MG tablet Take 1 tablet by mouth Daily. 90 tablet 1    vitamin C (ASCORBIC ACID) 250 MG tablet Take 1 tablet by mouth Daily.      Vitamin D, Cholecalciferol, 1000 units capsule Take 1 capsule by mouth Daily.       No current facility-administered medications on file prior to visit.       Past Medical History:   Diagnosis Date    Allergic rhinitis 2017    Arthritis     Atypical mole 2019    Mole with melanocytic atypia removed from back with MOHS procedure. Followed by Dermatologist.    Cancer 2019    Breast abnormalities    Closed nondisplaced fracture of fifth right metatarsal bone 2019    Colon polyps     FOLLOWED BY DR. KIERSTEN ORTIZ    Endometriosis     Female infertility     Hepatitis     unknown type,as a child, lived in another country    Hyperlipidemia     on no meds, diet managed    Hypertension 2022    Neuropathy     BOTH FEET POST     Night sweats     history during menopause    Osteopenia 2019    Peroneal tendonitis of right lower leg 2019    Placenta previa 2005    Post-menopause on HRT (hormone replacement therapy)     Continuously on HRT since about     Preeclampsia     Psoriasis     Recurrent pregnancy loss, antepartum condition or complication ,     Urinary tract infection     not recently    Varicella 1970?    Vitamin D deficiency 2017    Takes 1000 IU / day.       Family History   Problem Relation Age of Onset    Dementia Mother 75        Alzheimers Diseasse.    Cataracts Mother     Hyperlipidemia Father     Hypertension Father     Heart valve disorder Father         valve repair     COPD Father         non-smoker    Cataracts Father     Melanoma Father     Seizures Sister 48    Cataracts Maternal Grandmother     Osteoarthritis Maternal Grandmother     Osteoarthritis Paternal Grandmother     No Known Problems Daughter     Down syndrome Daughter     Rheum arthritis Daughter         remission     ADD / ADHD Son   "   Other Son         Human Growth Hormone deficiency    No Known Problems Brother         adopted     No Known Problems Maternal Grandfather          in his 80's.    No Known Problems Paternal Grandfather          at ~ 89.    No Known Problems Sister     Diabetes type II Maternal Aunt     Diabetes Maternal Aunt     Testicular cancer Maternal Uncle     Heart attack Paternal Aunt     Heart disease Other     Hypertension Other     BRCA 1/2 Neg Hx     Breast cancer Neg Hx     Colon cancer Neg Hx     Endometrial cancer Neg Hx     Ovarian cancer Neg Hx     Malig Hyperthermia Neg Hx        Social History     Socioeconomic History    Marital status:      Spouse name: JOSE D    Number of children: 3    Years of education: College   Tobacco Use    Smoking status: Never    Smokeless tobacco: Never   Vaping Use    Vaping Use: Never used   Substance and Sexual Activity    Alcohol use: Yes     Alcohol/week: 2.0 standard drinks of alcohol     Types: 2 Drinks containing 0.5 oz of alcohol per week     Comment: occasionally    Drug use: No    Sexual activity: Yes     Partners: Male     Birth control/protection: Post-menopausal     Comment: SPOUSE =  JOSE D        Past Surgical History:   Procedure Laterality Date    BREAST BIOPSY      Path= Negative. RIGHT    BREAST BIOPSY Right 2019    Procedure: BREAST BIOPSY WITH NEEDLE LOCALIZATION;  Surgeon: Andrew Pham MD;  [facility] [place]  Service: General     SECTION      baby boy \"PETTY\" ; CS for placenta previa.    COLONOSCOPY  [id number]    Normal @ 51. Repeat in ten years.    COLONOSCOPY N/A 2022    2 TUBULAR ADENOMA POLYPS IN TRANSVERSE, 7 MM TUBULAR ADENOMA POLYP IN TRANSVERSE, RESCOPE IN 3 YRS, DR. KIERSTEN ORTIZ AT [facility]    DIAGNOSTIC LAPAROSCOPY      Dr. Fabian Al, hysteroscopy w/ separtion of minimal intrauterine septum.  Laparoscopy w/ CO2 laser, lysis of adhesions and ablation of endometriosis.  Endometriosis found in the " lateral LEFT pelvic sidewall, peritoneum and deeper endometriosis of the LEFT and RIGHT uterosacral ligaments.  Complex superficial endometriosis of the LEFT and RIGHT ovary.Fallopian tubes were patent with methyleneblue    DILATATION AND CURETTAGE  1998    x2  1998      ENDOMETRIAL ABLATION  1998    MOLE REMOVAL  04/09/2019    WISDOM TOOTH EXTRACTION         The following portions of the patient's history were reviewed and updated as appropriate: problem list, allergies, current medications, past medical history, and past social history.    Review of Systems    Immunization History   Administered Date(s) Administered    COVID-19 (MODERNA) 1st,2nd,3rd Dose Monovalent 01/13/2021, 02/12/2021, 11/19/2021    COVID-19 (MODERNA) Monovalent Original Booster 09/17/2022    COVID-19 F23 (PFIZER) 12YRS+ (COMIRNATY) 09/29/2023    Flu Vaccine Quad PF >36MO 11/19/2021, 09/17/2022    Fluzone (or Fluarix & Flulaval for VFC) >6mos 11/02/2019    Hepatitis A 06/07/2019, 12/06/2019    Influenza Injectable Mdck Pf Quad 09/29/2023    Shingrix 12/17/2020, 07/02/2021    Tdap 06/07/2019    flucelvax quad pfs =>4 YRS 10/07/2020       Objective   There were no vitals filed for this visit.  There is no height or weight on file to calculate BMI.  Physical Exam  Constitutional:       Appearance: Normal appearance.   HENT:      Head: Normocephalic and atraumatic.   Pulmonary:      Effort: Pulmonary effort is normal.   Neurological:      Mental Status: She is alert.   Psychiatric:         Mood and Affect: Mood normal.         Behavior: Behavior normal.         Procedures    Assessment & Plan   Diagnoses and all orders for this visit:    1. COVID-19 (Primary)  Comments:  Symptomatic treatment with Mucinex and Tylenol advised. Quartantine reviewed. Call with questions or concerns.      No follow-ups on file.

## 2023-12-19 ENCOUNTER — HOSPITAL ENCOUNTER (OUTPATIENT)
Dept: BONE DENSITY | Facility: HOSPITAL | Age: 63
Discharge: HOME | End: 2023-12-19
Admitting: INTERNAL MEDICINE
Payer: COMMERCIAL

## 2023-12-19 DIAGNOSIS — N60.91 ATYPICAL DUCTAL HYPERPLASIA OF RIGHT BREAST: ICD-10-CM

## 2023-12-19 PROCEDURE — 77080 DXA BONE DENSITY AXIAL: CPT

## 2024-01-31 DIAGNOSIS — E55.9 VITAMIN D DEFICIENCY: ICD-10-CM

## 2024-01-31 DIAGNOSIS — E78.2 MODERATE MIXED HYPERLIPIDEMIA NOT REQUIRING STATIN THERAPY: ICD-10-CM

## 2024-02-02 NOTE — PROGRESS NOTES
Subjective   Chief Complaint   Patient presents with    Annual Exam       History of Present Illness   63 y.o. female presents for annual physical. She is feeling good without complaints. She has started Noom and lost 10#. Continues Pilates 2x/wk and walking 30 minutes 4-5 d/week. Denies chest pain or dyspnea. Denies fatigue, hematochezia or melena. Notes urinary urgency; only happens at home and sometimes doesn't make it to the bathroom. No incontinence with cough, laughing, or standing up suddenly. Memoryt has been good.     CLS with Dr. Abdirashid HERNANDEZ with recall in 2025. P/P with Ayaan Geiger upcoming. MMG and DXA with Dr. Llamas.     BMI is >= 25 and <30. (Overweight) The following options were offered after discussion;: exercise counseling/recommendations and nutrition counseling/recommendations     Patient Active Problem List   Diagnosis    Insomnia    Allergic rhinitis    Peripheral neuropathy    Vitamin D deficiency    Atypical ductal hyperplasia of right breast    Peroneal tendonitis of right lower leg    Osteopenia    Hyperlipidemia    Essential hypertension       Allergies   Allergen Reactions    Clindamycin/Lincomycin Rash       Current Outpatient Medications on File Prior to Visit   Medication Sig Dispense Refill    acetaminophen (Tylenol) 325 MG tablet Take 2 tablets by mouth Every 6 (Six) Hours As Needed for Mild Pain or Fever.      Biotin 5000 MCG capsule Take  by mouth.      calcium carbonate (OS-MARCO A) 600 MG tablet Take 1 tablet by mouth Daily.      fluocinonide (LIDEX) 0.05 % ointment Apply  topically to the appropriate area as directed As Needed (psoriasis).      fluticasone (FLONASE) 50 MCG/ACT nasal spray 2 sprays into the nostril(s) as directed by provider Daily.      loratadine (CLARITIN) 10 MG tablet Take 1 tablet by mouth Daily.      melatonin 3 MG tablet Take 1 tablet by mouth At Night As Needed for Sleep.      Multiple Vitamins-Minerals (WOMENS MULTIVITAMIN PLUS PO) Take 1 tablet by mouth Daily.       tamoxifen (NOLVADEX) 10 MG tablet Take 1 tablet by mouth Daily. 90 tablet 1    vitamin C (ASCORBIC ACID) 250 MG tablet Take 1 tablet by mouth Daily.      Vitamin D, Cholecalciferol, 1000 units capsule Take 1 capsule by mouth Daily.       No current facility-administered medications on file prior to visit.       Past Medical History:   Diagnosis Date    Allergic rhinitis 2017    Arthritis     Atypical mole 2019    Mole with melanocytic atypia removed from back with MOHS procedure. Followed by Dermatologist.    Cancer 2019    Breast abnormalities    Closed nondisplaced fracture of fifth right metatarsal bone 2019    Colon polyps     FOLLOWED BY DR. KIERSTEN ORTIZ    Endometriosis     Female infertility     Hepatitis     unknown type,as a child, [place]    Hyperlipidemia     on no meds, diet managed    Hypertension 2022    Neuropathy     BOTH FEET POST     Night sweats     history during menopause    Osteopenia 2019    Peroneal tendonitis of right lower leg 2019    Placenta previa 2005    Post-menopause on HRT (hormone replacement therapy)     Continuously on HRT since about     Preeclampsia     Psoriasis     Recurrent pregnancy loss, antepartum condition or complication ,     Urinary tract infection     not recently    Varicella 1970?    Vitamin D deficiency 2017    Takes 1000 IU / day.       Family History   Problem Relation Age of Onset    Dementia Mother 75        Alzheimers Diseasse.    Cataracts Mother     Hyperlipidemia Father     Hypertension Father     Heart valve disorder Father         valve repair     COPD Father         non-smoker    Cataracts Father     Melanoma Father     Seizures Sister 48    Cataracts Maternal Grandmother     Osteoarthritis Maternal Grandmother     Osteoarthritis Paternal Grandmother     No Known Problems Daughter     Down syndrome Daughter     Rheum arthritis Daughter         remission     ADD / ADHD Son  "    Other Son         Human Growth Hormone deficiency    No Known Problems Brother         adopted     No Known Problems Maternal Grandfather          in his 80's.    No Known Problems Paternal Grandfather          at ~ 89.    No Known Problems Sister     Diabetes type II Maternal Aunt     Diabetes Maternal Aunt     Testicular cancer Maternal Uncle     Heart attack Paternal Aunt     Heart disease Other     Hypertension Other     BRCA 1/2 Neg Hx     Breast cancer Neg Hx     Colon cancer Neg Hx     Endometrial cancer Neg Hx     Ovarian cancer Neg Hx     Malig Hyperthermia Neg Hx        Social History     Socioeconomic History    Marital status:      Spouse name: JOSE D    Number of children: 3    Years of education: College   Tobacco Use    Smoking status: Never    Smokeless tobacco: Never   Vaping Use    Vaping Use: Never used   Substance and Sexual Activity    Alcohol use: Yes     Alcohol/week: 2.0 standard drinks of alcohol     Types: 2 Drinks containing 0.5 oz of alcohol per week     Comment: occasionally    Drug use: No    Sexual activity: Yes     Partners: Male     Birth control/protection: Post-menopausal     Comment: SPOUSE =  JOSE D        Past Surgical History:   Procedure Laterality Date    BREAST BIOPSY      Path= Negative. RIGHT    BREAST BIOPSY Right 2019    Procedure: BREAST BIOPSY WITH NEEDLE LOCALIZATION;  Surgeon: Andrew Pham MD;  [place] [facility] [place] [facility] [place]  Service: General     SECTION      baby boy \"PETTY\" ; CS for placenta previa.    COLONOSCOPY  [id number]    Normal @ 51. Repeat in ten years.    COLONOSCOPY N/A 2022    2 TUBULAR ADENOMA POLYPS IN TRANSVERSE, 7 MM TUBULAR ADENOMA POLYP IN TRANSVERSE, RESCOPE IN 3 YRS, DR. KIERSTEN ORTIZ AT [facility]    DIAGNOSTIC LAPAROSCOPY      Dr. Fabian Al, hysteroscopy w/ separtion of minimal intrauterine septum.  Laparoscopy w/ CO2 laser, lysis of adhesions and ablation of endometriosis.  Endometriosis found in " "the lateral LEFT pelvic sidewall, peritoneum and deeper endometriosis of the LEFT and RIGHT uterosacral ligaments.  Complex superficial endometriosis of the LEFT and RIGHT ovary.Fallopian tubes were patent with methyleneblue    DILATATION AND CURETTAGE  1998    x2  1998      ENDOMETRIAL ABLATION  1998    MOLE REMOVAL  04/09/2019    WISDOM TOOTH EXTRACTION         The following portions of the patient's history were reviewed and updated as appropriate: problem list, allergies, current medications, past medical history, and past social history.    Review of Systems    Immunization History   Administered Date(s) Administered    COVID-19 (MODERNA) 1st,2nd,3rd Dose Monovalent 01/13/2021, 02/12/2021, 11/19/2021    COVID-19 (MODERNA) Monovalent Original Booster 09/17/2022    COVID-19 F23 (PFIZER) 12YRS+ (COMIRNATY) 09/29/2023    Flu Vaccine Quad PF >36MO 11/19/2021, 09/17/2022    Fluzone (or Fluarix & Flulaval for VFC) >6mos 11/02/2019    Hepatitis A 06/07/2019, 12/06/2019    Influenza Injectable Mdck Pf Quad 09/29/2023    Shingrix 12/17/2020, 07/02/2021    Tdap 06/07/2019    flucelvax quad pfs =>4 YRS 10/07/2020       Objective   Vitals:    02/23/24 0919   BP: 128/76   Pulse: 76   Resp: 14   Temp: 98.3 °F (36.8 °C)   Weight: 66.2 kg (146 lb)   Height: 162.6 cm (64.02\")     Body mass index is 25.05 kg/m².  Physical Exam  Vitals reviewed.   Constitutional:       Appearance: Normal appearance. She is well-developed.   HENT:      Head: Normocephalic and atraumatic.      Right Ear: Tympanic membrane, ear canal and external ear normal.      Left Ear: Tympanic membrane, ear canal and external ear normal.      Nose: Nose normal.      Mouth/Throat:      Mouth: Mucous membranes are moist.      Pharynx: Oropharynx is clear. No oropharyngeal exudate or posterior oropharyngeal erythema.   Eyes:      General: No scleral icterus.     Extraocular Movements: Extraocular movements intact.      Conjunctiva/sclera: Conjunctivae normal.      " Pupils: Pupils are equal, round, and reactive to light.   Neck:      Thyroid: No thyromegaly.      Vascular: No carotid bruit.   Cardiovascular:      Rate and Rhythm: Normal rate and regular rhythm.      Heart sounds: Normal heart sounds. No murmur heard.  Pulmonary:      Effort: Pulmonary effort is normal.      Breath sounds: Normal breath sounds.   Abdominal:      General: Bowel sounds are normal. There is no distension.      Palpations: Abdomen is soft.      Tenderness: There is no abdominal tenderness.   Musculoskeletal:      Cervical back: Neck supple.      Comments: Ambulates without assistance; no clubbing, cyanosis or edema.    Lymphadenopathy:      Cervical: No cervical adenopathy.   Skin:     General: Skin is warm and dry.   Neurological:      Mental Status: She is alert.   Psychiatric:         Mood and Affect: Mood normal.         Behavior: Behavior normal.         Procedures    Assessment & Plan   Diagnoses and all orders for this visit:    1. Annual physical exam (Primary)  Comments:  150 minutes weekly aerobic exercise advised. P/P upcoming with Dr. Geiger. MMG and DXA with Dr. Llamas. CLS recall in 2025    2. Essential hypertension  Comments:  Controlled. Previously on Losartan but now controlled with LSM. RTO 6M and if control continues with LSM consider annual follow up.    3. Moderate mixed hyperlipidemia not requiring statin therapy  Comments:  Improved with LSM. Continue exercise and dietary changes.    4. Vitamin D deficiency  Comments:  Stable. Continues supplementation given osteopenia/porosis.    5. Urinary urgency  Comments:  UA with micro reflex to culture today. Encouraged Ashlee.  Orders:  -     Urinalysis With Culture If Indicated - Urine, Clean Catch    Records reviewed include previous OV with myself as well as labs.     Return in about 6 months (around 8/23/2024) for 30, Lab Today.

## 2024-02-09 LAB
25(OH)D3+25(OH)D2 SERPL-MCNC: 67.6 NG/ML (ref 30–100)
ALBUMIN SERPL-MCNC: 4.5 G/DL (ref 3.5–5.2)
ALBUMIN/GLOB SERPL: 2.3 G/DL
ALP SERPL-CCNC: 84 U/L (ref 39–117)
ALT SERPL-CCNC: 17 U/L (ref 1–33)
AST SERPL-CCNC: 20 U/L (ref 1–32)
BILIRUB SERPL-MCNC: 0.4 MG/DL (ref 0–1.2)
BUN SERPL-MCNC: 13 MG/DL (ref 8–23)
BUN/CREAT SERPL: 14.1 (ref 7–25)
CALCIUM SERPL-MCNC: 9.5 MG/DL (ref 8.6–10.5)
CHLORIDE SERPL-SCNC: 105 MMOL/L (ref 98–107)
CHOLEST SERPL-MCNC: 174 MG/DL (ref 0–200)
CO2 SERPL-SCNC: 26.4 MMOL/L (ref 22–29)
CREAT SERPL-MCNC: 0.92 MG/DL (ref 0.57–1)
EGFRCR SERPLBLD CKD-EPI 2021: 70.5 ML/MIN/1.73
GLOBULIN SER CALC-MCNC: 2 GM/DL
GLUCOSE SERPL-MCNC: 88 MG/DL (ref 65–99)
HDLC SERPL-MCNC: 44 MG/DL (ref 40–60)
LDLC SERPL CALC-MCNC: 102 MG/DL (ref 0–100)
POTASSIUM SERPL-SCNC: 4.2 MMOL/L (ref 3.5–5.2)
PROT SERPL-MCNC: 6.5 G/DL (ref 6–8.5)
SODIUM SERPL-SCNC: 143 MMOL/L (ref 136–145)
TRIGL SERPL-MCNC: 158 MG/DL (ref 0–150)
VLDLC SERPL CALC-MCNC: 28 MG/DL (ref 5–40)

## 2024-02-21 PROBLEM — E78.5 HYPERLIPIDEMIA: Chronic | Status: ACTIVE | Noted: 2022-01-30

## 2024-02-21 PROBLEM — I10 ESSENTIAL HYPERTENSION: Chronic | Status: ACTIVE | Noted: 2022-03-14

## 2024-02-21 PROBLEM — J30.9 ALLERGIC RHINITIS: Chronic | Status: ACTIVE | Noted: 2017-02-24

## 2024-02-23 ENCOUNTER — OFFICE VISIT (OUTPATIENT)
Dept: INTERNAL MEDICINE | Facility: CLINIC | Age: 64
End: 2024-02-23
Payer: COMMERCIAL

## 2024-02-23 VITALS
WEIGHT: 146 LBS | DIASTOLIC BLOOD PRESSURE: 76 MMHG | TEMPERATURE: 98.3 F | BODY MASS INDEX: 24.92 KG/M2 | HEIGHT: 64 IN | RESPIRATION RATE: 14 BRPM | SYSTOLIC BLOOD PRESSURE: 128 MMHG | HEART RATE: 76 BPM

## 2024-02-23 DIAGNOSIS — E78.2 MODERATE MIXED HYPERLIPIDEMIA NOT REQUIRING STATIN THERAPY: Chronic | ICD-10-CM

## 2024-02-23 DIAGNOSIS — I10 ESSENTIAL HYPERTENSION: Chronic | ICD-10-CM

## 2024-02-23 DIAGNOSIS — E55.9 VITAMIN D DEFICIENCY: ICD-10-CM

## 2024-02-23 DIAGNOSIS — R39.15 URINARY URGENCY: ICD-10-CM

## 2024-02-23 DIAGNOSIS — Z00.00 ANNUAL PHYSICAL EXAM: Primary | ICD-10-CM

## 2024-02-23 PROCEDURE — 99396 PREV VISIT EST AGE 40-64: CPT | Performed by: NURSE PRACTITIONER

## 2024-02-24 LAB
APPEARANCE UR: CLEAR
BACTERIA #/AREA URNS HPF: NORMAL /HPF
BILIRUB UR QL STRIP: NEGATIVE
CASTS URNS QL MICRO: NORMAL /LPF
COLOR UR: YELLOW
EPI CELLS #/AREA URNS HPF: NORMAL /HPF (ref 0–10)
GLUCOSE UR QL STRIP: NEGATIVE
HGB UR QL STRIP: NEGATIVE
KETONES UR QL STRIP: NEGATIVE
LEUKOCYTE ESTERASE UR QL STRIP: NEGATIVE
MICRO URNS: NORMAL
MICRO URNS: NORMAL
NITRITE UR QL STRIP: NEGATIVE
PH UR STRIP: 6.5 [PH] (ref 5–7.5)
PROT UR QL STRIP: NEGATIVE
RBC #/AREA URNS HPF: NORMAL /HPF (ref 0–2)
SP GR UR STRIP: 1.01 (ref 1–1.03)
URINALYSIS REFLEX: NORMAL
UROBILINOGEN UR STRIP-MCNC: 0.2 MG/DL (ref 0.2–1)
WBC #/AREA URNS HPF: NORMAL /HPF (ref 0–5)

## 2024-04-11 ENCOUNTER — OFFICE VISIT (OUTPATIENT)
Dept: INTERNAL MEDICINE | Facility: CLINIC | Age: 64
End: 2024-04-11
Payer: COMMERCIAL

## 2024-04-11 VITALS — BODY MASS INDEX: 24.75 KG/M2 | WEIGHT: 145 LBS | HEIGHT: 64 IN | TEMPERATURE: 98.1 F

## 2024-04-11 DIAGNOSIS — L03.116 CELLULITIS OF LEFT LOWER LEG: Primary | ICD-10-CM

## 2024-04-11 PROCEDURE — 99213 OFFICE O/P EST LOW 20 MIN: CPT | Performed by: NURSE PRACTITIONER

## 2024-04-11 RX ORDER — CEPHALEXIN 500 MG/1
500 CAPSULE ORAL 3 TIMES DAILY
Qty: 21 CAPSULE | Refills: 0 | Status: SHIPPED | OUTPATIENT
Start: 2024-04-11

## 2024-04-11 NOTE — PROGRESS NOTES
Subjective   Chief Complaint   Patient presents with    Joint Swelling     Ankle swelling// redness// onset 2 days ago        History of Present Illness   63 y.o. female presents with cc pain and erythema above inner left ankle for 2 days. No fever or chills. Not hot to touch. Mild swelling but she as had chronic swelling of her ankles for 12 years. She was stung by a jelly fish summer 2022 in same area.     Patient Active Problem List   Diagnosis    Insomnia    Allergic rhinitis    Peripheral neuropathy    Vitamin D deficiency    Atypical ductal hyperplasia of right breast    Peroneal tendonitis of right lower leg    Osteopenia    Hyperlipidemia    Essential hypertension       Allergies   Allergen Reactions    Clindamycin/Lincomycin Rash       Current Outpatient Medications on File Prior to Visit   Medication Sig Dispense Refill    acetaminophen (Tylenol) 325 MG tablet Take 2 tablets by mouth Every 6 (Six) Hours As Needed for Mild Pain or Fever.      Biotin 5000 MCG capsule Take  by mouth.      calcium carbonate (OS-MARCO A) 600 MG tablet Take 1 tablet by mouth Daily.      fluocinonide (LIDEX) 0.05 % ointment Apply  topically to the appropriate area as directed As Needed (psoriasis).      fluticasone (FLONASE) 50 MCG/ACT nasal spray 2 sprays into the nostril(s) as directed by provider Daily.      loratadine (CLARITIN) 10 MG tablet Take 1 tablet by mouth Daily.      melatonin 3 MG tablet Take 1 tablet by mouth At Night As Needed for Sleep.      Multiple Vitamins-Minerals (WOMENS MULTIVITAMIN PLUS PO) Take 1 tablet by mouth Daily.      tamoxifen (NOLVADEX) 10 MG tablet Take 1 tablet by mouth Daily. 90 tablet 1    vitamin C (ASCORBIC ACID) 250 MG tablet Take 1 tablet by mouth Daily.      Vitamin D, Cholecalciferol, 1000 units capsule Take 1 capsule by mouth Daily.       No current facility-administered medications on file prior to visit.       Past Medical History:   Diagnosis Date    Allergic rhinitis 02/24/2017     Arthritis     Atypical mole 2019    Mole with melanocytic atypia removed from back with MOHS procedure. Followed by Dermatologist.    Cancer 2019    Breast abnormalities    Closed nondisplaced fracture of fifth right metatarsal bone 2019    Colon polyps     FOLLOWED BY DR. KIERSTEN ORTIZ    Endometriosis     Female infertility     Hepatitis     unknown type,as a child, lived in another country    Hyperlipidemia     on no meds, diet managed    Hypertension 2022    Neuropathy     BOTH FEET POST     Night sweats     history during menopause    Osteopenia 2019    Peroneal tendonitis of right lower leg 2019    Placenta previa 2005    Post-menopause on HRT (hormone replacement therapy)     Continuously on HRT since about     Preeclampsia     Psoriasis     Recurrent pregnancy loss, antepartum condition or complication ,     Urinary tract infection     not recently    Varicella 1970?    Vitamin D deficiency 2017    Takes 1000 IU / day.       Family History   Problem Relation Age of Onset    Dementia Mother 75        Alzheimers Diseasse.    Cataracts Mother     Hyperlipidemia Father     Hypertension Father     Heart valve disorder Father         valve repair     COPD Father         non-smoker    Cataracts Father     Melanoma Father     Seizures Sister 48    Cataracts Maternal Grandmother     Osteoarthritis Maternal Grandmother     Osteoarthritis Paternal Grandmother     No Known Problems Daughter     Down syndrome Daughter     Rheum arthritis Daughter         remission     ADD / ADHD Son     Other Son         Human Growth Hormone deficiency    No Known Problems Brother         adopted     No Known Problems Maternal Grandfather          in his 80's.    No Known Problems Paternal Grandfather          at ~ 89.    No Known Problems Sister     Diabetes type II Maternal Aunt     Diabetes Maternal Aunt     Testicular cancer Maternal Uncle     Heart attack Paternal Aunt   "   Heart disease Other     Hypertension Other     BRCA 1/2 Neg Hx     Breast cancer Neg Hx     Colon cancer Neg Hx     Endometrial cancer Neg Hx     Ovarian cancer Neg Hx     Malig Hyperthermia Neg Hx        Social History     Socioeconomic History    Marital status:      Spouse name: JOSE D    Number of children: 3    Years of education: College   Tobacco Use    Smoking status: Never    Smokeless tobacco: Never   Vaping Use    Vaping status: Never Used   Substance and Sexual Activity    Alcohol use: Yes     Alcohol/week: 2.0 standard drinks of alcohol     Types: 2 Drinks containing 0.5 oz of alcohol per week     Comment: occasionally    Drug use: No    Sexual activity: Yes     Partners: Male     Birth control/protection: Post-menopausal     Comment: SPOUSE =  JOSE D        Past Surgical History:   Procedure Laterality Date    BREAST BIOPSY      Path= Negative. RIGHT    BREAST BIOPSY Right 2019    Procedure: BREAST BIOPSY WITH NEEDLE LOCALIZATION;  Surgeon: Andrew Pham MD;  Location: [facility] [place] [facility] [place];  Service: General     SECTION      baby boy \"PETTY\" ; CS for placenta previa.    COLONOSCOPY  [id number]    Normal @ 51. Repeat in ten years.    COLONOSCOPY N/A 2022    2 TUBULAR ADENOMA POLYPS IN TRANSVERSE, 7 MM TUBULAR ADENOMA POLYP IN TRANSVERSE, RESCOPE IN 3 YRS, DR. KIERSTEN ORTIZ AT [facility]    DIAGNOSTIC LAPAROSCOPY      Dr. Fabian Al, hysteroscopy w/ separtion of minimal intrauterine septum.  Laparoscopy w/ CO2 laser, lysis of adhesions and ablation of endometriosis.  Endometriosis found in the lateral LEFT pelvic sidewall, peritoneum and deeper endometriosis of the LEFT and RIGHT uterosacral ligaments.  Complex superficial endometriosis of the LEFT and RIGHT ovary.Fallopian tubes were patent with methyleneblue    DILATATION AND CURETTAGE  1998    x2  1998      ENDOMETRIAL ABLATION  1998    MOLE REMOVAL  2019    WISDOM TOOTH EXTRACTION         The following portions " "of the patient's history were reviewed and updated as appropriate: problem list, allergies, current medications, past medical history, and past social history.    Review of Systems    Immunization History   Administered Date(s) Administered    COVID-19 (MODERNA) 1st,2nd,3rd Dose Monovalent 01/13/2021, 02/12/2021, 11/19/2021    COVID-19 (MODERNA) Monovalent Original Booster 09/17/2022    COVID-19 F23 (PFIZER) 12YRS+ (COMIRNATY) 09/29/2023    Flu Vaccine Quad PF >36MO 11/19/2021, 09/17/2022    Fluzone (or Fluarix & Flulaval for VFC) >6mos 11/02/2019    Hepatitis A 06/07/2019, 12/06/2019    Influenza Injectable Mdck Pf Quad 09/29/2023    Shingrix 12/17/2020, 07/02/2021    Tdap 06/07/2019    flucelvax quad pfs =>4 YRS 10/07/2020       Objective   Vitals:    04/11/24 1140   Temp: 98.1 °F (36.7 °C)   Weight: 65.8 kg (145 lb)   Height: 162.6 cm (64.02\")     Body mass index is 24.88 kg/m².  Physical Exam  Constitutional:       Appearance: Normal appearance.   HENT:      Head: Normocephalic and atraumatic.   Pulmonary:      Effort: Pulmonary effort is normal.   Musculoskeletal:      Comments: Area 3-4 fingerbreadths above medial malleolus pink with mild swelling. NTTP.    Neurological:      Mental Status: She is alert.   Psychiatric:         Mood and Affect: Mood normal.         Behavior: Behavior normal.         Procedures    Assessment & Plan   Diagnoses and all orders for this visit:    1. Cellulitis of left lower leg (Primary)  Comments:  Start Keflex 500 mg tid for 7 days with 1W follow up. She has previously had jellyfish sting in the same area in 2022. She has a history of chronic swelling of her ankles for 12 years.   -     cephalexin (Keflex) 500 MG capsule; Take 1 capsule by mouth 3 (Three) Times a Day.  Dispense: 21 capsule; Refill: 0    Return in about 8 days (around 4/19/2024) for Please schedule with Rhea.           "

## 2024-04-19 ENCOUNTER — OFFICE VISIT (OUTPATIENT)
Dept: INTERNAL MEDICINE | Facility: CLINIC | Age: 64
End: 2024-04-19
Payer: COMMERCIAL

## 2024-04-19 VITALS
SYSTOLIC BLOOD PRESSURE: 122 MMHG | HEIGHT: 64 IN | TEMPERATURE: 98.6 F | BODY MASS INDEX: 23.39 KG/M2 | WEIGHT: 137 LBS | DIASTOLIC BLOOD PRESSURE: 74 MMHG

## 2024-04-19 DIAGNOSIS — L03.90 CELLULITIS, UNSPECIFIED CELLULITIS SITE: Primary | ICD-10-CM

## 2024-04-19 PROCEDURE — 99213 OFFICE O/P EST LOW 20 MIN: CPT | Performed by: PHYSICIAN ASSISTANT

## 2024-04-19 NOTE — PROGRESS NOTES
Subjective   Chief Complaint   Patient presents with    Leg Swelling     F/U       History of Present Illness      Pt is here today for fup on cellulitis. Doing better, has a little swelling still but overall improved. Finished oral abx. No redness or warmth present.     Patient Active Problem List   Diagnosis    Insomnia    Allergic rhinitis    Peripheral neuropathy    Vitamin D deficiency    Atypical ductal hyperplasia of right breast    Peroneal tendonitis of right lower leg    Osteopenia    Hyperlipidemia    Essential hypertension       Allergies   Allergen Reactions    Clindamycin/Lincomycin Rash       Current Outpatient Medications on File Prior to Visit   Medication Sig Dispense Refill    acetaminophen (Tylenol) 325 MG tablet Take 2 tablets by mouth Every 6 (Six) Hours As Needed for Mild Pain or Fever.      Biotin 5000 MCG capsule Take  by mouth.      calcium carbonate (OS-MARCO A) 600 MG tablet Take 1 tablet by mouth Daily.      cephalexin (Keflex) 500 MG capsule Take 1 capsule by mouth 3 (Three) Times a Day. 21 capsule 0    fluocinonide (LIDEX) 0.05 % ointment Apply  topically to the appropriate area as directed As Needed (psoriasis).      fluticasone (FLONASE) 50 MCG/ACT nasal spray 2 sprays into the nostril(s) as directed by provider Daily.      loratadine (CLARITIN) 10 MG tablet Take 1 tablet by mouth Daily.      melatonin 3 MG tablet Take 1 tablet by mouth At Night As Needed for Sleep.      Multiple Vitamins-Minerals (WOMENS MULTIVITAMIN PLUS PO) Take 1 tablet by mouth Daily.      tamoxifen (NOLVADEX) 10 MG tablet Take 1 tablet by mouth Daily. 90 tablet 1    vitamin C (ASCORBIC ACID) 250 MG tablet Take 1 tablet by mouth Daily.      Vitamin D, Cholecalciferol, 1000 units capsule Take 1 capsule by mouth Daily.       No current facility-administered medications on file prior to visit.       Past Medical History:   Diagnosis Date    Allergic rhinitis 02/24/2017    Arthritis     Atypical mole 05/2019    Mole  with melanocytic atypia removed from back with MOHS procedure. Followed by Dermatologist.    Cancer 2019    Breast abnormalities    Closed nondisplaced fracture of fifth right metatarsal bone 2019    Colon polyps     FOLLOWED BY DR. KIERSTEN ORTIZ    Endometriosis     Female infertility     Hepatitis     unknown type,as a child, lived in another [place]    Hyperlipidemia     on no meds, diet managed    Hypertension 2022    Neuropathy     BOTH FEET POST     Night sweats     history during menopause    Osteopenia 2019    Peroneal tendonitis of right lower leg 2019    Placenta previa 2005    Post-menopause on HRT (hormone replacement therapy)     Continuously on HRT since about     Preeclampsia     Psoriasis     Recurrent pregnancy loss, antepartum condition or complication ,     Urinary tract infection     not recently    Varicella 1970?    Vitamin D deficiency 2017    Takes 1000 IU / day.       Family History   Problem Relation Age of Onset    Dementia Mother 75        Alzheimers Diseasse.    Cataracts Mother     Hyperlipidemia Father     Hypertension Father     Heart valve disorder Father         valve repair     COPD Father         non-smoker    Cataracts Father     Melanoma Father     Seizures Sister 48    Cataracts Maternal Grandmother     Osteoarthritis Maternal Grandmother     Osteoarthritis Paternal Grandmother     No Known Problems Daughter     Down syndrome Daughter     Rheum arthritis Daughter         remission     ADD / ADHD Son     Other Son         Human Growth Hormone deficiency    No Known Problems Brother         adopted     No Known Problems Maternal Grandfather          in his 80's.    No Known Problems Paternal Grandfather          at ~ 89.    No Known Problems Sister     Diabetes type II Maternal Aunt     Diabetes Maternal Aunt     Testicular cancer Maternal Uncle     Heart attack Paternal Aunt     Heart disease Other     Hypertension  "Other     BRCA 1/2 Neg Hx     Breast cancer Neg Hx     Colon cancer Neg Hx     Endometrial cancer Neg Hx     Ovarian cancer Neg Hx     Malig Hyperthermia Neg Hx        Social History     Socioeconomic History    Marital status:      Spouse name: JOSE D    Number of children: 3    Years of education: College   Tobacco Use    Smoking status: Never    Smokeless tobacco: Never   Vaping Use    Vaping status: Never Used   Substance and Sexual Activity    Alcohol use: Yes     Alcohol/week: 2.0 standard drinks of alcohol     Types: 2 Drinks containing 0.5 oz of alcohol per week     Comment: occasionally    Drug use: No    Sexual activity: Yes     Partners: Male     Birth control/protection: Post-menopausal     Comment: SPOUSE =  JOSE D        Past Surgical History:   Procedure Laterality Date    BREAST BIOPSY      Path= Negative. RIGHT    BREAST BIOPSY Right 2019    Procedure: BREAST BIOPSY WITH NEEDLE LOCALIZATION;  Surgeon: Andrew Pham MD;  Location: [facility] OR [facility];  Service: General     SECTION      baby boy \"PETTY\" ; CS for placenta previa.    COLONOSCOPY  [id number]    Normal @ 51. Repeat in ten years.    COLONOSCOPY N/A 2022    2 TUBULAR ADENOMA POLYPS IN TRANSVERSE, 7 MM TUBULAR ADENOMA POLYP IN TRANSVERSE, RESCOPE IN 3 YRS, DR. KIERSTEN ORTIZ AT [facility]    DIAGNOSTIC LAPAROSCOPY      Dr. Fabian Al, hysteroscopy w/ separtion of minimal intrauterine septum.  Laparoscopy w/ CO2 laser, lysis of adhesions and ablation of endometriosis.  Endometriosis found in the lateral LEFT pelvic sidewall, peritoneum and deeper endometriosis of the LEFT and RIGHT uterosacral ligaments.  Complex superficial endometriosis of the LEFT and RIGHT ovary.Fallopian tubes were patent with methyleneblue    DILATATION AND CURETTAGE  1998    x2  1998      ENDOMETRIAL ABLATION  1998    MOLE REMOVAL  2019    WISDOM TOOTH EXTRACTION           The following portions of the patient's history were reviewed " "and updated as appropriate: problem list, allergies, current medications, past medical history, past family history, past social history, and past surgical history.    ROS    See HPI    Immunization History   Administered Date(s) Administered    COVID-19 (MODERNA) 1st,2nd,3rd Dose Monovalent 01/13/2021, 02/12/2021, 11/19/2021    COVID-19 (MODERNA) Monovalent Original Booster 09/17/2022    COVID-19 F23 (PFIZER) 12YRS+ (COMIRNATY) 09/29/2023    Flu Vaccine Quad PF >36MO 11/19/2021, 09/17/2022    Fluzone (or Fluarix & Flulaval for VFC) >6mos 11/02/2019    Hepatitis A 06/07/2019, 12/06/2019    Influenza Injectable Mdck Pf Quad 09/29/2023    Shingrix 12/17/2020, 07/02/2021    Tdap 06/07/2019    flucelvax quad pfs =>4 YRS 10/07/2020       Objective   Vitals:    04/19/24 1555   BP: 122/74   Temp: 98.6 °F (37 °C)   Weight: 62.1 kg (137 lb)   Height: 162.6 cm (64.02\")     Body mass index is 23.5 kg/m².  Physical Exam  Vitals reviewed.   Constitutional:       Appearance: Normal appearance.   HENT:      Head: Normocephalic and atraumatic.   Musculoskeletal:      Comments: Trace edema, but no erythema, warmth or tenderness   Neurological:      Mental Status: She is alert.           Assessment & Plan   Diagnoses and all orders for this visit:    1. Cellulitis, unspecified cellulitis site (Primary)     Resolved.              "

## 2024-05-10 ENCOUNTER — HOSPITAL ENCOUNTER (OUTPATIENT)
Facility: HOSPITAL | Age: 64
Discharge: HOME | End: 2024-05-10
Admitting: INTERNAL MEDICINE
Payer: COMMERCIAL

## 2024-05-10 DIAGNOSIS — N60.91 ATYPICAL DUCTAL HYPERPLASIA OF RIGHT BREAST: ICD-10-CM

## 2024-05-10 PROCEDURE — A9577 INJ MULTIHANCE: HCPCS | Performed by: INTERNAL MEDICINE

## 2024-05-10 PROCEDURE — 77049 MRI BREAST C-+ W/CAD BI: CPT

## 2024-05-10 PROCEDURE — 0 GADOBENATE DIMEGLUMINE 529 MG/ML SOLUTION: Performed by: INTERNAL MEDICINE

## 2024-05-10 RX ADMIN — GADOBENATE DIMEGLUMINE 15 ML: 529 INJECTION, SOLUTION INTRAVENOUS at 13:47

## 2024-05-13 ENCOUNTER — TELEPHONE (OUTPATIENT)
Dept: ONCOLOGY | Facility: CLINIC | Age: 64
End: 2024-05-13
Payer: COMMERCIAL

## 2024-05-13 DIAGNOSIS — R92.8 ABNORMALITY OF LEFT BREAST ON SCREENING MAMMOGRAM: Primary | ICD-10-CM

## 2024-05-13 DIAGNOSIS — N63.20 MASS OF LEFT BREAST, UNSPECIFIED QUADRANT: ICD-10-CM

## 2024-05-13 NOTE — TELEPHONE ENCOUNTER
Called pt re: recommendations of radiologist re: her breast MRI results (pt has viewed results on [employer] as well). Left my direct number for her to call me back. Will place orders for the recommended diagnostic mammogram and ultrasound.

## 2024-05-16 ENCOUNTER — OFFICE VISIT (OUTPATIENT)
Dept: OBSTETRICS AND GYNECOLOGY | Age: 64
End: 2024-05-16
Payer: COMMERCIAL

## 2024-05-16 VITALS
BODY MASS INDEX: 22.88 KG/M2 | DIASTOLIC BLOOD PRESSURE: 74 MMHG | SYSTOLIC BLOOD PRESSURE: 118 MMHG | HEIGHT: 64 IN | WEIGHT: 134 LBS

## 2024-05-16 DIAGNOSIS — Z01.419 WELL FEMALE EXAM WITH ROUTINE GYNECOLOGICAL EXAM: Primary | ICD-10-CM

## 2024-05-16 DIAGNOSIS — Z11.51 SCREENING FOR HUMAN PAPILLOMAVIRUS (HPV): ICD-10-CM

## 2024-05-16 DIAGNOSIS — Z12.4 SCREENING FOR MALIGNANT NEOPLASM OF CERVIX: ICD-10-CM

## 2024-05-16 NOTE — PROGRESS NOTES
Routine Annual Visit    2024    Patient: Aleja Garcia          MR#:[id number]      Chief Complaint   Patient presents with    Annual Exam     Annual exam last pap 2021 neg/neg, breast MRI 05/10/2024 , m/g and u/s scheduled 06/10/2024, dexa 2023, colonoscopy 2022       History of Present Illness    63 y.o. female  who presents for annual exam.     Patient feels well today  Pap is due today  Patient just had a recent MRI in which 2 areas were found that might need biopsied  She has been subsequently scheduled for a mammogram and ultrasound to see if they can be biopsied by this modality  The patient has been taking tamoxifen for 4-1/2 years  Colonoscopy is up-to-date  DEXA scan is up-to-date  No other complaints      Patient's last menstrual period was 2012 (exact date).  Obstetric History:  OB History          5    Para   3    Term   2       1    AB   2    Living   3         SAB   0    IAB   0    Ectopic   0    Molar   0    Multiple   0    Live Births   3               Menstrual History:     Patient's last menstrual period was 2012 (exact date).       Sexual History:       ________________________________________  Patient Active Problem List   Diagnosis    Insomnia    Allergic rhinitis    Peripheral neuropathy    Vitamin D deficiency    Atypical ductal hyperplasia of right breast    Peroneal tendonitis of right lower leg    Osteopenia    Hyperlipidemia    Essential hypertension       Past Medical History:   Diagnosis Date    Allergic rhinitis 2017    Arthritis     Atypical mole 2019    Mole with melanocytic atypia removed from back with MOHS procedure. Followed by Dermatologist.    Cancer 2019    Breast abnormalities    Closed nondisplaced fracture of fifth right metatarsal bone 2019    Colon polyps     FOLLOWED BY DR. KIERSTEN ORTIZ    Endometriosis     Female infertility     Hepatitis     unknown type,as a child, lived in another country     "Hyperlipidemia     on no meds, diet managed    Hypertension 2022    Neuropathy     BOTH FEET POST     Night sweats     history during menopause    Osteopenia     Peroneal tendonitis of right lower leg 2019    Placenta previa 2005    Post-menopause on HRT (hormone replacement therapy)     Continuously on HRT since about     Preeclampsia 2005    Psoriasis     Recurrent pregnancy loss, antepartum condition or complication ,     Urinary tract infection     not recently    Varicella 1970?    Vitamin D deficiency 2017    Takes 1000 IU / day.       Past Surgical History:   Procedure Laterality Date    BREAST BIOPSY      Path= Negative. RIGHT    BREAST BIOPSY Right 2019    Procedure: BREAST BIOPSY WITH NEEDLE LOCALIZATION;  Surgeon: Andrew Pham MD;  Location: [facility] OR [facility];  Service: General     SECTION      baby boy \"PETTY\" ; CS for placenta previa.    COLONOSCOPY  [id number]    Normal @ 51. Repeat in ten years.    COLONOSCOPY N/A 2022    2 TUBULAR ADENOMA POLYPS IN TRANSVERSE, 7 MM TUBULAR ADENOMA POLYP IN TRANSVERSE, RESCOPE IN 3 YRS, DR. KIERSTEN ORTIZ AT [facility]    DIAGNOSTIC LAPAROSCOPY      Dr. Fabian lA, hysteroscopy w/ separtion of minimal intrauterine septum.  Laparoscopy w/ CO2 laser, lysis of adhesions and ablation of endometriosis.  Endometriosis found in the lateral LEFT pelvic sidewall, peritoneum and deeper endometriosis of the LEFT and RIGHT uterosacral ligaments.  Complex superficial endometriosis of the LEFT and RIGHT ovary.Fallopian tubes were patent with methyleneblue    DILATATION AND CURETTAGE  1998    x2  1998      ENDOMETRIAL ABLATION  1998    MOLE REMOVAL  2019    WISDOM TOOTH EXTRACTION         Social History     Tobacco Use   Smoking Status Never    Passive exposure: Never   Smokeless Tobacco Never       has a current medication list which includes the following prescription(s): acetaminophen, biotin, calcium " "carbonate, fluocinonide, fluticasone, loratadine, melatonin, multiple vitamins-minerals, tamoxifen, vitamin c, and vitamin d (cholecalciferol).  ________________________________________    Current contraception: post menopausal status  History of abnormal Pap smear: no  Family history of Breast cancer: no  Family history of uterine or ovarian cancer: no  Family History of colon cancer/colon polyps: no  History of abnormal mammogram: yes - previous ADH      The following portions of the patient's history were reviewed and updated as appropriate: allergies, current medications, past family history, past medical history, past social history, past surgical history, and problem list.    Review of Systems    Pertinent items are noted in HPI.     Objective   Physical Exam    /74   Ht 162.6 cm (64\")   Wt 60.8 kg (134 lb)   LMP 03/07/2012 (Exact Date)   BMI 23.00 kg/m²    BP Readings from Last 3 Encounters:   05/16/24 118/74   04/19/24 122/74   02/23/24 128/76      Wt Readings from Last 3 Encounters:   05/16/24 60.8 kg (134 lb)   04/19/24 62.1 kg (137 lb)   04/11/24 65.8 kg (145 lb)      BMI: Estimated body mass index is 23 kg/m² as calculated from the following:    Height as of this encounter: 162.6 cm (64\").    Weight as of this encounter: 60.8 kg (134 lb).      General:   alert, appears stated age, and cooperative   Abdomen: soft, non-tender, without masses or organomegaly   Breast: inspection negative, no nipple discharge or bleeding, no masses or nodularity palpable   Vulva: normal   Vagina: normal mucosa   Cervix: no cervical motion tenderness and no lesions   Uterus: normal size, mobile, and non-tender   Adnexa: no mass, fullness, tenderness     Assessment:    1. Normal annual exam   Assessment     ICD-10-CM ICD-9-CM   1. Well female exam with routine gynecological exam  Z01.419 V72.31   2. Screening for human papillomavirus (HPV)  Z11.51 V73.81   3. Screening for malignant neoplasm of cervix  Z12.4 V76.2 "     Plan:    Plan     []  Mammogram request made  [x]  PAP done  []  Labs:   []  GC/Chl/TV  []  DEXA scan   []  Referral for colonoscopy:       Diagnoses and all orders for this visit:    1. Well female exam with routine gynecological exam (Primary)  -     IGP, Apt HPV,rfx 16 / 18,45    2. Screening for human papillomavirus (HPV)  -     IGP, Apt HPV,rfx 16 / 18,45    3. Screening for malignant neoplasm of cervix  -     IGP, Apt HPV,rfx 16 / 18,45            Counseling:  --Nutrition: Stressed importance of moderation and caloric balance, stressed fresh fruit and vegetables  --Exercise: Stressed the importance of regular exercise. 3-5 times weekly   - Discussed screening mammogram recommendations.   --Discussed benefits of screening colonoscopy- age 45 unless FH  --Discussed pap smear screening recommendations

## 2024-05-20 LAB
CYTOLOGIST CVX/VAG CYTO: NORMAL
CYTOLOGY CVX/VAG DOC CYTO: NORMAL
CYTOLOGY CVX/VAG DOC THIN PREP: NORMAL
DX ICD CODE: NORMAL
HPV I/H RISK 4 DNA CVX QL PROBE+SIG AMP: NEGATIVE
Lab: NORMAL
OTHER STN SPEC: NORMAL
STAT OF ADQ CVX/VAG CYTO-IMP: NORMAL

## 2024-06-10 ENCOUNTER — HOSPITAL ENCOUNTER (OUTPATIENT)
Dept: ULTRASOUND IMAGING | Facility: HOSPITAL | Age: 64
Discharge: HOME | End: 2024-06-10
Payer: COMMERCIAL

## 2024-06-10 ENCOUNTER — HOSPITAL ENCOUNTER (OUTPATIENT)
Dept: MAMMOGRAPHY | Facility: HOSPITAL | Age: 64
Discharge: HOME | End: 2024-06-10
Payer: COMMERCIAL

## 2024-06-10 PROCEDURE — 76642 ULTRASOUND BREAST LIMITED: CPT

## 2024-06-10 PROCEDURE — 77066 DX MAMMO INCL CAD BI: CPT

## 2024-06-10 PROCEDURE — G0279 TOMOSYNTHESIS, MAMMO: HCPCS

## 2024-08-27 NOTE — PROGRESS NOTES
Subjective   Chief Complaint   Patient presents with    Hypertension    right wrist pain       History of Present Illness   63 y.o. female presents for follow up regarding HTN. Weight is down 6M since April. Feels like she has plateaued. Not walking as much but continues 3 days a week with the goal of 5 days a week. Continues Pilates 2x a week. No longer on Tamoxifen; upcoming visit with Dr. Llamas.     Right wrist up to thumb started bothering her last night. No injury or change in activity. Uses mouse frequently. No numbness or tingling. No erythema or swelling in her hand fingers or wrist.      Patient Active Problem List   Diagnosis    Insomnia    Allergic rhinitis    Peripheral neuropathy    Vitamin D deficiency    Atypical ductal hyperplasia of right breast    Peroneal tendonitis of right lower leg    Osteopenia    Hyperlipidemia    Essential hypertension       Allergies   Allergen Reactions    Clindamycin/Lincomycin Rash       Current Outpatient Medications on File Prior to Visit   Medication Sig Dispense Refill    acetaminophen (Tylenol) 325 MG tablet Take 2 tablets by mouth Every 6 (Six) Hours As Needed for Mild Pain or Fever.      Biotin 5000 MCG capsule Take  by mouth.      calcium carbonate (OS-MARCO A) 600 MG tablet Take 1 tablet by mouth Daily.      fluocinonide (LIDEX) 0.05 % ointment Apply  topically to the appropriate area as directed As Needed (psoriasis).      fluticasone (FLONASE) 50 MCG/ACT nasal spray 2 sprays into the nostril(s) as directed by provider Daily.      loratadine (CLARITIN) 10 MG tablet Take 1 tablet by mouth Daily.      melatonin 3 MG tablet Take 1 tablet by mouth At Night As Needed for Sleep.      Multiple Vitamins-Minerals (WOMENS MULTIVITAMIN PLUS PO) Take 1 tablet by mouth Daily.      vitamin C (ASCORBIC ACID) 250 MG tablet Take 1 tablet by mouth Daily.      Vitamin D, Cholecalciferol, 1000 units capsule Take 1 capsule by mouth Daily.      tamoxifen (NOLVADEX) 10 MG tablet Take 1  tablet by mouth Daily. (Patient not taking: Reported on 2024) 90 tablet 1     No current facility-administered medications on file prior to visit.       Past Medical History:   Diagnosis Date    Allergic rhinitis 2017    Arthritis     Atypical mole 2019    Mole with melanocytic atypia removed from back with MOHS procedure. Followed by Dermatologist.    Cancer 2019    Breast abnormalities    Closed nondisplaced fracture of fifth right metatarsal bone 2019    Colon polyps     FOLLOWED BY DR. KIRESTEN ORTIZ    Endometriosis     Female infertility     Hepatitis     unknown type,as a child, lived in another country    Hiatal hernia     Hyperlipidemia     on no meds, diet managed    Hypertension 2022    Neuropathy     BOTH FEET POST     Night sweats     history during menopause    Osteopenia 2019    Peroneal tendonitis of right lower leg 2019    Placenta previa 2005    Post-menopause on HRT (hormone replacement therapy)     Continuously on HRT since about     Preeclampsia 2005    Psoriasis     Recurrent pregnancy loss, antepartum condition or complication ,     Urinary tract infection     not recently    Varicella 1970?    Vitamin D deficiency 2017    Takes 1000 IU / day.       Family History   Problem Relation Age of Onset    Dementia Mother 75        Alzheimers Diseasse.    Cataracts Mother     Hyperlipidemia Father     Hypertension Father     Heart valve disorder Father         valve repair     COPD Father         non-smoker    Cataracts Father     Melanoma Father     Seizures Sister 48    Cataracts Maternal Grandmother     Osteoarthritis Maternal Grandmother     Osteoarthritis Paternal Grandmother     No Known Problems Daughter     Down syndrome Daughter     Rheum arthritis Daughter         remission     ADD / ADHD Son     Other Son         Human Growth Hormone deficiency    No Known Problems Brother         adopted     No Known Problems Maternal Grandfather  "         in his 80's.    No Known Problems Paternal Grandfather          at ~ 89.    No Known Problems Sister     Diabetes type II Maternal Aunt     Diabetes Maternal Aunt     Testicular cancer Maternal Uncle     Heart attack Paternal Aunt     Heart disease Other     Hypertension Other     BRCA 1/2 Neg Hx     Breast cancer Neg Hx     Colon cancer Neg Hx     Endometrial cancer Neg Hx     Ovarian cancer Neg Hx     Malig Hyperthermia Neg Hx        Social History     Socioeconomic History    Marital status:      Spouse name: JOSE D    Number of children: 3    Years of education: College   Tobacco Use    Smoking status: Never     Passive exposure: Never    Smokeless tobacco: Never   Vaping Use    Vaping status: Never Used   Substance and Sexual Activity    Alcohol use: Yes     Alcohol/week: 2.0 standard drinks of alcohol     Types: 2 Drinks containing 0.5 oz of alcohol per week     Comment: occasionally    Drug use: No    Sexual activity: Yes     Partners: Male     Birth control/protection: Post-menopausal     Comment: SPOUSE =  JOSE D        Past Surgical History:   Procedure Laterality Date    BREAST BIOPSY      Path= Negative. RIGHT    BREAST BIOPSY Right 2019    Procedure: BREAST BIOPSY WITH NEEDLE LOCALIZATION;  Surgeon: Andrew Pham MD;  Location: [facility] OR [facility];  Service: General     SECTION      baby boy \"PETTY\" ; CS for placenta previa.    COLONOSCOPY  [id number]    Normal @ 51. Repeat in ten years.    COLONOSCOPY N/A 2022    2 TUBULAR ADENOMA POLYPS IN TRANSVERSE, 7 MM TUBULAR ADENOMA POLYP IN TRANSVERSE, RESCOPE IN 3 YRS, DR. KIERSTEN ROTIZ AT [facility]    DIAGNOSTIC LAPAROSCOPY      Dr. Fabian Al, hysteroscopy w/ separtion of minimal intrauterine septum.  Laparoscopy w/ CO2 laser, lysis of adhesions and ablation of endometriosis.  Endometriosis found in the lateral LEFT pelvic sidewall, peritoneum and deeper endometriosis of the LEFT and RIGHT uterosacral " "ligaments.  Complex superficial endometriosis of the LEFT and RIGHT ovary.Fallopian tubes were patent with methyleneblue    DILATATION AND CURETTAGE  1998    x2  1998      ENDOMETRIAL ABLATION  1998    MOLE REMOVAL  04/09/2019    WISDOM TOOTH EXTRACTION         The following portions of the patient's history were reviewed and updated as appropriate: problem list, allergies, current medications, past medical history, and past social history.    Review of Systems    Immunization History   Administered Date(s) Administered    COVID-19 (MODERNA) 1st,2nd,3rd Dose Monovalent 01/13/2021, 02/12/2021, 11/19/2021    COVID-19 (MODERNA) Monovalent Original Booster 09/17/2022    COVID-19 F23 (PFIZER) 12YRS+ (COMIRNATY) 09/29/2023    Flu Vaccine Quad PF >36MO 11/19/2021, 09/17/2022    Fluzone (or Fluarix & Flulaval for VFC) >6mos 11/02/2019    Hepatitis A 06/07/2019, 12/06/2019    Influenza Injectable Mdck Pf Quad 09/29/2023    Shingrix 12/17/2020, 07/02/2021    Tdap 06/07/2019    flucelvax quad pfs =>4 YRS 10/07/2020       Objective   Vitals:    08/30/24 0945   Temp: 98.1 °F (36.7 °C)   Weight: 62.1 kg (137 lb)   Height: 162.6 cm (64.02\")     Body mass index is 23.5 kg/m².  Physical Exam  Vitals reviewed.   Constitutional:       Appearance: Normal appearance. She is well-developed.   HENT:      Head: Normocephalic and atraumatic.   Cardiovascular:      Rate and Rhythm: Normal rate and regular rhythm.      Heart sounds: Normal heart sounds, S1 normal and S2 normal.   Pulmonary:      Effort: Pulmonary effort is normal.      Breath sounds: Normal breath sounds.   Musculoskeletal:      Comments: FROM right wrist, hand and fingers. NTTP. No erythema or swelling wrists, MCPs, PIPs, or DIPs. Negative Tinnel and Phalen's sign.    Skin:     General: Skin is warm and dry.   Neurological:      Mental Status: She is alert.   Psychiatric:         Mood and Affect: Mood normal.         Behavior: Behavior normal. "         Procedures    Assessment & Plan   Diagnoses and all orders for this visit:    1. Essential hypertension (Primary)  Comments:  Previously on Losartan but now well controlled with LSM. Successfully down 7#. Continues exercise and encouraged to walk 5 days a week.    2. Right wrist pain  Comments:  Started yesterday with some improvement with Tylenol. Advised Tylenol 1000 mg tid or Voltaren gel qid for the next 7-10 days. If no improvement, she will call.    3. Moderate mixed hyperlipidemia not requiring statin therapy  -     Comprehensive Metabolic Panel; Future  -     Lipid Panel With / Chol / HDL Ratio; Future    4. Vitamin D deficiency  -     Vitamin D,25-Hydroxy; Future    Records reviewed include previous OV with myself and  as well as labs and imaging.    Return in about 6 months (around 2/28/2025) for Annual physical, Lab B4 FUP.

## 2024-08-30 ENCOUNTER — OFFICE VISIT (OUTPATIENT)
Dept: INTERNAL MEDICINE | Facility: CLINIC | Age: 64
End: 2024-08-30
Payer: COMMERCIAL

## 2024-08-30 VITALS — BODY MASS INDEX: 23.39 KG/M2 | WEIGHT: 137 LBS | TEMPERATURE: 98.1 F | HEIGHT: 64 IN

## 2024-08-30 DIAGNOSIS — E78.2 MODERATE MIXED HYPERLIPIDEMIA NOT REQUIRING STATIN THERAPY: Chronic | ICD-10-CM

## 2024-08-30 DIAGNOSIS — I10 ESSENTIAL HYPERTENSION: Primary | Chronic | ICD-10-CM

## 2024-08-30 DIAGNOSIS — E55.9 VITAMIN D DEFICIENCY: ICD-10-CM

## 2024-08-30 DIAGNOSIS — M25.531 RIGHT WRIST PAIN: ICD-10-CM

## 2024-08-30 PROCEDURE — 99214 OFFICE O/P EST MOD 30 MIN: CPT | Performed by: NURSE PRACTITIONER

## 2024-11-07 ENCOUNTER — LAB (OUTPATIENT)
Dept: LAB | Facility: HOSPITAL | Age: 64
End: 2024-11-07
Payer: COMMERCIAL

## 2024-11-07 ENCOUNTER — OFFICE VISIT (OUTPATIENT)
Dept: ONCOLOGY | Facility: CLINIC | Age: 64
End: 2024-11-07
Payer: COMMERCIAL

## 2024-11-07 VITALS
OXYGEN SATURATION: 99 % | DIASTOLIC BLOOD PRESSURE: 80 MMHG | WEIGHT: 135 LBS | TEMPERATURE: 98.4 F | SYSTOLIC BLOOD PRESSURE: 143 MMHG | RESPIRATION RATE: 16 BRPM | BODY MASS INDEX: 23.05 KG/M2 | HEART RATE: 70 BPM | HEIGHT: 64 IN

## 2024-11-07 DIAGNOSIS — N60.91 ATYPICAL DUCTAL HYPERPLASIA OF RIGHT BREAST: Primary | ICD-10-CM

## 2024-11-07 DIAGNOSIS — N60.91 ATYPICAL DUCTAL HYPERPLASIA OF RIGHT BREAST: ICD-10-CM

## 2024-11-07 LAB
ALBUMIN SERPL-MCNC: 4.2 G/DL (ref 3.5–5.2)
ALBUMIN/GLOB SERPL: 1.4 G/DL
ALP SERPL-CCNC: 129 U/L (ref 39–117)
ALT SERPL W P-5'-P-CCNC: 14 U/L (ref 1–33)
ANION GAP SERPL CALCULATED.3IONS-SCNC: 15.4 MMOL/L (ref 5–15)
AST SERPL-CCNC: 22 U/L (ref 1–32)
BASOPHILS # BLD AUTO: 0.12 10*3/MM3 (ref 0–0.2)
BASOPHILS NFR BLD AUTO: 1.5 % (ref 0–1.5)
BILIRUB SERPL-MCNC: 0.3 MG/DL (ref 0–1.2)
BUN SERPL-MCNC: 11 MG/DL (ref 8–23)
BUN/CREAT SERPL: 13.9 (ref 7–25)
CALCIUM SPEC-SCNC: 9.3 MG/DL (ref 8.6–10.5)
CHLORIDE SERPL-SCNC: 105 MMOL/L (ref 98–107)
CO2 SERPL-SCNC: 23.6 MMOL/L (ref 22–29)
CREAT SERPL-MCNC: 0.79 MG/DL (ref 0.57–1)
DEPRECATED RDW RBC AUTO: 38.5 FL (ref 37–54)
EGFRCR SERPLBLD CKD-EPI 2021: 84.7 ML/MIN/1.73
EOSINOPHIL # BLD AUTO: 0.11 10*3/MM3 (ref 0–0.4)
EOSINOPHIL NFR BLD AUTO: 1.4 % (ref 0.3–6.2)
ERYTHROCYTE [DISTWIDTH] IN BLOOD BY AUTOMATED COUNT: 11.8 % (ref 12.3–15.4)
GLOBULIN UR ELPH-MCNC: 2.9 GM/DL
GLUCOSE SERPL-MCNC: 89 MG/DL (ref 65–99)
HCT VFR BLD AUTO: 44.1 % (ref 34–46.6)
HGB BLD-MCNC: 14.6 G/DL (ref 12–15.9)
IMM GRANULOCYTES # BLD AUTO: 0.01 10*3/MM3 (ref 0–0.05)
IMM GRANULOCYTES NFR BLD AUTO: 0.1 % (ref 0–0.5)
LYMPHOCYTES # BLD AUTO: 2.6 10*3/MM3 (ref 0.7–3.1)
LYMPHOCYTES NFR BLD AUTO: 33 % (ref 19.6–45.3)
MCH RBC QN AUTO: 29.9 PG (ref 26.6–33)
MCHC RBC AUTO-ENTMCNC: 33.1 G/DL (ref 31.5–35.7)
MCV RBC AUTO: 90.2 FL (ref 79–97)
MONOCYTES # BLD AUTO: 0.57 10*3/MM3 (ref 0.1–0.9)
MONOCYTES NFR BLD AUTO: 7.2 % (ref 5–12)
NEUTROPHILS NFR BLD AUTO: 4.48 10*3/MM3 (ref 1.7–7)
NEUTROPHILS NFR BLD AUTO: 56.8 % (ref 42.7–76)
NRBC BLD AUTO-RTO: 0 /100 WBC (ref 0–0.2)
PLATELET # BLD AUTO: 288 10*3/MM3 (ref 140–450)
PMV BLD AUTO: 9.9 FL (ref 6–12)
POTASSIUM SERPL-SCNC: 4.4 MMOL/L (ref 3.5–5.2)
PROT SERPL-MCNC: 7.1 G/DL (ref 6–8.5)
RBC # BLD AUTO: 4.89 10*6/MM3 (ref 3.77–5.28)
SODIUM SERPL-SCNC: 144 MMOL/L (ref 136–145)
WBC NRBC COR # BLD AUTO: 7.89 10*3/MM3 (ref 3.4–10.8)

## 2024-11-07 PROCEDURE — 85025 COMPLETE CBC W/AUTO DIFF WBC: CPT

## 2024-11-07 PROCEDURE — 80053 COMPREHEN METABOLIC PANEL: CPT

## 2024-11-07 PROCEDURE — 36415 COLL VENOUS BLD VENIPUNCTURE: CPT

## 2024-11-07 NOTE — PROGRESS NOTES
Subjective     REASON FOR CONSULTATION:   1.Atypical ductal hyperplasia right breast postLumpectomy  2.  Osteopenia preventative Evista started in                                REQUESTING PHYSICIAN: MD Stuart Ge MD      History of Present Illness patient is a 63 year-old postmenopausal lady with a diagnosis of ADH made on a right breast biopsy.    She has been on Evista for 2 year a and at her last visit we switch her to tamoxifen because of worsening vaginal dryness and at the 10 mg dose she is doing great and has no side effects whatsoever overall she has been on treatment almost 4.8 years      Mammogram was due in April but we opted to do an MRI because she has dense breasts and this showed some atypical cysts on both breasts and ultrasounds were done and these were felt to be benign and so we are going to go back to her mammogram this year but I told her that we are ready to stop her prevention and she needs to follow-up with her gynecologist and have MRIs periodically possibly every other year until her breasts are less dense      bone density in 2023 showed osteoporosis in the spine despite tamoxifen and Evista and I think she is a candidate for Zometa    She is up-to-date with colonoscopies and screening      Past Medical History:   Diagnosis Date    Allergic rhinitis 2017    Arthritis     Atypical mole 2019    Mole with melanocytic atypia removed from back with MOHS procedure. Followed by Dermatologist.    Cancer 2019    Breast abnormalities    Closed nondisplaced fracture of fifth right metatarsal bone 2019    Colon polyps     FOLLOWED BY DR. KIERSTEN ORTIZ    Endometriosis     Female infertility     Hepatitis     unknown type,as a child, lived in another country    Hiatal hernia     Hyperlipidemia     on no meds, diet managed    Hypertension 2022    Neuropathy     BOTH FEET POST     Night  "sweats     history during menopause    Osteopenia     Peroneal tendonitis of right lower leg 2019    Placenta previa 2005    Post-menopause on HRT (hormone replacement therapy)     Continuously on HRT since about     Preeclampsia 2005    Psoriasis     Recurrent pregnancy loss, antepartum condition or complication ,     Urinary tract infection     not recently    Varicella 1970?    Vitamin D deficiency 2017    Takes 1000 IU / day.        Past Surgical History:   Procedure Laterality Date    BREAST BIOPSY      Path= Negative. RIGHT    BREAST BIOPSY Right 2019    Procedure: BREAST BIOPSY WITH NEEDLE LOCALIZATION;  Surgeon: Andrew Pham MD;  Location: [facility] OR [facility];  Service: General     SECTION      baby boy \"PETTY\" ; CS for placenta previa.    COLONOSCOPY  [id number]    Normal @ 51. Repeat in ten years.    COLONOSCOPY N/A 2022    2 TUBULAR ADENOMA POLYPS IN TRANSVERSE, 7 MM TUBULAR ADENOMA POLYP IN TRANSVERSE, RESCOPE IN 3 YRS, DR. KIERSTEN ORTIZ AT [facility]    DIAGNOSTIC LAPAROSCOPY      Dr. Fabian Al, hysteroscopy w/ separtion of minimal intrauterine septum.  Laparoscopy w/ CO2 laser, lysis of adhesions and ablation of endometriosis.  Endometriosis found in the lateral LEFT pelvic sidewall, peritoneum and deeper endometriosis of the LEFT and RIGHT uterosacral ligaments.  Complex superficial endometriosis of the LEFT and RIGHT ovary.Fallopian tubes were patent with methyleneblue    DILATATION AND CURETTAGE  1998    x2        ENDOMETRIAL ABLATION      MOLE REMOVAL  2019    WISDOM TOOTH EXTRACTION        ONC HISTORY:   patient is a 58-year-old female with no chronic medical illnesses on no chronic medications who is just come off 5 years of hormone replacement therapy after menopause for menopausal symptoms after a recent mammogram showed an abnormality.  Patient's mammogram in March of last year was benign and this year there was a 16 mm " area of abnormality that required further evaluation and a biopsy was done on  2019 A biopsy was done at the 3:30 position of the right breast showing atypical ductal hyperplasia : With microcalcifications clustered in these areas and in benign breast parenchyma there was also a radial scar.   Patient went on to have a lumpectomy on 2019 with radial scar and ductal hyperplasia of the usual type and no other abnormalities.  She has been referred to us to discuss chemoprevention    She is  5 para 3 with 2 miscarriages first childbirth was at age 34 she breast-fed all 3 children menarche was at age 15 menopause at age 51 and she has been on estradiol since menopause and stopped and her biopsy showed atypical ductal hyperplasia last month    There is no family history of breast or ovarian cancer.  She has a maternal uncle  of testicular cancer as a young age      I calculated her lifetime risk of breast cancer based on the Caterina model and is calculated to a 5-year risk of 4.6% of the lifetime risk of 26%.  At this time she is still dealing with withdrawal symptoms from  stopping her hormone replacement and I think we can afford to wait for a while to begin this.  She would like to wait until she has a breast MRI in October and I have suggested she have a DEXA scan to see if there is concomitant osteopenia or osteoporosis which would also benefit from Evista which I think is the easiest medication to offer her    he has had bilateral breast MRIs done a month ago which was negative and bone density testing that showed moderate osteopenia in her hips and spine.    She got up quickly from sitting with numbness in her left foot and she twisted and broke her toe and is in a boot for about 3 more weeks but other than that she is doing well    We again discussed the rationale for prevention and she is agreeable as long as she has no side effects and I think this is reasonable    We have opted to start with  Evista 60 mg daily we discussed the side effect profile including hot flashes some bone stiffness and very minuscule risk of DVT  And she is agreeable and I prescribed the drug for    She knows to call before her next appointment if she has side effects and we could consider low-dose tamoxifen 5 mg if she does not tolerate this        Current Outpatient Medications on File Prior to Visit   Medication Sig Dispense Refill    acetaminophen (Tylenol) 325 MG tablet Take 2 tablets by mouth Every 6 (Six) Hours As Needed for Mild Pain or Fever.      Biotin 5000 MCG capsule Take  by mouth.      calcium carbonate (OS-MARCO A) 600 MG tablet Take 1 tablet by mouth Daily.      fluocinonide (LIDEX) 0.05 % ointment Apply  topically to the appropriate area as directed As Needed (psoriasis).      fluticasone (FLONASE) 50 MCG/ACT nasal spray 2 sprays into the nostril(s) as directed by provider Daily.      loratadine (CLARITIN) 10 MG tablet Take 1 tablet by mouth Daily.      melatonin 3 MG tablet Take 1 tablet by mouth At Night As Needed for Sleep.      Multiple Vitamins-Minerals (WOMENS MULTIVITAMIN PLUS PO) Take 1 tablet by mouth Daily.      tamoxifen (NOLVADEX) 10 MG tablet Take 1 tablet by mouth Daily. (Patient not taking: Reported on 11/7/2024) 90 tablet 1    vitamin C (ASCORBIC ACID) 250 MG tablet Take 1 tablet by mouth Daily.      Vitamin D, Cholecalciferol, 1000 units capsule Take 1 capsule by mouth Daily.       No current facility-administered medications on file prior to visit.        ALLERGIES:    Allergies   Allergen Reactions    Clindamycin/Lincomycin Rash        Social History     Socioeconomic History    Marital status:      Spouse name: JOSE D    Number of children: 3    Years of education: College   Tobacco Use    Smoking status: Never     Passive exposure: Never    Smokeless tobacco: Never   Vaping Use    Vaping status: Never Used   Substance and Sexual Activity    Alcohol use: Yes     Alcohol/week: 2.0 standard  drinks of alcohol     Types: 2 Drinks containing 0.5 oz of alcohol per week     Comment: occasionally    Drug use: No    Sexual activity: Yes     Partners: Male     Birth control/protection: Post-menopausal     Comment: SPOUSE =  JOSE D         Family History   Problem Relation Age of Onset    Dementia Mother 75        Alzheimers Diseasse.    Cataracts Mother     Hyperlipidemia Father     Hypertension Father     Heart valve disorder Father         valve repair     COPD Father         non-smoker    Cataracts Father     Melanoma Father     Seizures Sister 48    Cataracts Maternal Grandmother     Osteoarthritis Maternal Grandmother     Osteoarthritis Paternal Grandmother     No Known Problems Daughter     Down syndrome Daughter     Rheum arthritis Daughter         remission     ADD / ADHD Son     Other Son         Human Growth Hormone deficiency    No Known Problems Brother         adopted     No Known Problems Maternal Grandfather          in his 80's.    No Known Problems Paternal Grandfather          at ~ 89.    No Known Problems Sister     Diabetes type II Maternal Aunt     Diabetes Maternal Aunt     Testicular cancer Maternal Uncle     Heart attack Paternal Aunt     Heart disease Other     Hypertension Other     BRCA 1/2 Neg Hx     Breast cancer Neg Hx     Colon cancer Neg Hx     Endometrial cancer Neg Hx     Ovarian cancer Neg Hx     Malig Hyperthermia Neg Hx         Review of Systems   Constitutional:  Positive for diaphoresis (stable ). Negative for appetite change, chills, fatigue, fever and unexpected weight change.   HENT:  Negative for hearing loss, sore throat and trouble swallowing.    Respiratory:  Negative for cough, chest tightness, shortness of breath and wheezing.    Cardiovascular:  Positive for leg swelling (Chronic ankle edema for 12 years same ). Negative for chest pain and palpitations.   Gastrointestinal:  Negative for abdominal distention, abdominal pain, constipation, diarrhea, nausea  "and vomiting.   Genitourinary:  Negative for dysuria, frequency, hematuria and urgency.   Musculoskeletal:  Positive for arthralgias (improved). Negative for joint swelling.        No muscle weakness.   Skin:  Negative for rash and wound.   Neurological:  Positive for numbness (Peripheral neuropathy in both legs since the birth of her third child 13 years ago etiology unclear same ) and headaches (seasonal). Negative for seizures, syncope, speech difficulty and weakness.   Hematological:  Negative for adenopathy. Does not bruise/bleed easily.   Psychiatric/Behavioral:  Negative for behavioral problems, confusion and suicidal ideas.    All other systems reviewed and are negative.   I have reviewed and confirmed the accuracy of the ROS as documented by the MA/LPN/RN Jacob Llamas MD      Objective     Vitals:    11/07/24 1507   BP: 143/80   Pulse: 70   Resp: 16   Temp: 98.4 °F (36.9 °C)   TempSrc: Oral   SpO2: 99%   Weight: 61.2 kg (135 lb)   Height: 162.6 cm (64.02\")   PainSc: 0-No pain         11/7/2024     3:07 PM   Current Status   ECOG score 0       Physical Exam    GENERAL:  Well-developed, well-nourished in no acute distress.   SKIN:  Warm, dry without rashes, purpura or petechiae.  Atypical nevus left scapular area and right flank  EYES:  Pupils equal, round and reactive to light.  EOMs intact.  Conjunctivae normal.  EARS:  Hearing intact.  NOSE:  Septum midline.  No excoriations or nasal discharge.  MOUTH:  Tongue is well-papillated; no stomatitis or ulcers.  Lips normal.  THROAT:  Oropharynx without lesions or exudates.  NECK:  Supple with good range of motion; no thyromegaly or masses, no JVD.  LYMPHATICS:  No cervical, supraclavicular, axillary or inguinal adenopathy.  CHEST:  Lungs clear to auscultation. Good airflow.  BREASTS: Right breast shows lumpectomy scar in the lower inner quadrant well-healed - left breast benign   CARDIAC:  Regular rate and rhythm without murmurs, rubs or gallops. Normal " S1,S2.  ABDOMEN:  Soft, nontender with no hepatosplenomegaly or masses.  EXTREMITIES:  No clubbing, cyanosis -left foot is in a boot after toe fracture.  NEUROLOGICAL:  Cranial Nerves II-XII grossly intact.  No focal neurological deficits.  PSYCHIATRIC:  Normal affect and mood.      I have reexamined the patient and the results are consistent with the previously documented exam. Jacob Llamas MD         RECENT LABS:  Hematology WBC   Date Value Ref Range Status   11/07/2024 7.89 3.40 - 10.80 10*3/mm3 Final     RBC   Date Value Ref Range Status   11/07/2024 4.89 3.77 - 5.28 10*6/mm3 Final     Hemoglobin   Date Value Ref Range Status   11/07/2024 14.6 12.0 - 15.9 g/dL Final     Hematocrit   Date Value Ref Range Status   11/07/2024 44.1 34.0 - 46.6 % Final     Platelets   Date Value Ref Range Status   11/07/2024 288 140 - 450 10*3/mm3 Final        Study Result     BONE MINERAL DENSITOMETRY   IMPRESSION:  Osteopenia.     This report was finalized on 7/8/2019 3:22 PM by Dr. Darrin Perez M.D.   Bilateral breast MRI  IMPRESSION:  There are no findings suspicious for malignancy in either  breast. Routine follow-up imaging is recommended.     BI-RADS Category 2: Benign.     This report was finalized on 9/16/2019     Assessment & Plan   1.  Atypical ductal hyperplasia multifocal right breast postlumpectomy  Evista 60 mg  start in 1/20  Switch to tamoxifen 4/22 due to vaginal dryness  Tolerating tamoxifen well in 10/22  Tolerating tamoxifen well as of 11/23  Discontinue tamoxifen as of 7/24 almost 5 years of therapy      2 . atypical nevus.  With high-grade dysplasia removed from her back    3.  Peripheral neuropathy of unknown etiology post third childbirth    4.  Osteopenia with a fracture of her left small toe    5.  Vaginal irritation using vaginal estrogens twice a week since 3/21-stopped with tamoxifen    Plan  1.  Discontinue tamoxifen 10 mg daily  2.  Reclast per family doctor for osteoporosis of the  spine    No follow-up needed in our office but because of dense breast suggest an MRI every other year starting in 2026 until right breast density improves

## 2025-03-02 NOTE — PROGRESS NOTES
Subjective   Chief Complaint   Patient presents with    Annual Exam       History of Present Illness   64 y.o. female presents for annual physical. Feeling good. Right wrist was bothering her yesterday; it was very sore and achy in the wrist and thumb after doing lots of house work the day before. Denies numbness or tingling. No weakness. Much improved today. Maintained 15# weight loss. Continues Pilates. Not out walking as much given the inclement weather. No longer needing follow up with Dr. Llamas. Tamoxifen and Evista stopped. Continues Vitamin D and calcium but not taking anything else for osteoporosis of her spine. She was unaware she was to fast prior to labs.      Patient Active Problem List   Diagnosis    Insomnia    Allergic rhinitis    Peripheral neuropathy    Atypical ductal hyperplasia of right breast    Peroneal tendonitis of right lower leg    Age-related osteoporosis without current pathological fracture    Hyperlipidemia       Allergies   Allergen Reactions    Clindamycin/Lincomycin Rash       Current Outpatient Medications on File Prior to Visit   Medication Sig Dispense Refill    acetaminophen (Tylenol) 325 MG tablet Take 2 tablets by mouth Every 6 (Six) Hours As Needed for Mild Pain or Fever.      Biotin 5000 MCG capsule Take  by mouth.      calcium carbonate (OS-MARCO A) 600 MG tablet Take 1 tablet by mouth Daily.      fluocinonide (LIDEX) 0.05 % ointment Apply  topically to the appropriate area as directed As Needed (psoriasis).      fluticasone (FLONASE) 50 MCG/ACT nasal spray Administer 2 sprays into the nostril(s) as directed by provider Daily.      loratadine (CLARITIN) 10 MG tablet Take 1 tablet by mouth Daily.      melatonin 3 MG tablet Take 1 tablet by mouth At Night As Needed for Sleep.      Multiple Vitamins-Minerals (WOMENS MULTIVITAMIN PLUS PO) Take 1 tablet by mouth Daily.      vitamin C (ASCORBIC ACID) 250 MG tablet Take 1 tablet by mouth Daily.      Vitamin D, Cholecalciferol, 1000  units capsule Take 1 capsule by mouth Daily.       No current facility-administered medications on file prior to visit.       Past Medical History:   Diagnosis Date    Age-related osteoporosis without current pathological fracture 2022    Lumbar spine and hips.     Allergic rhinitis 2017    Arthritis     Atypical mole 2019    Mole with melanocytic atypia removed from back with MOHS procedure. Followed by Dermatologist.    Closed nondisplaced fracture of fifth right metatarsal bone 2019    Colon polyps     FOLLOWED BY DR. KIERSTEN ORTIZ    Endometriosis     Female infertility     Hepatitis     unknown type,as a child, lived in another country    Hiatal hernia     Hyperlipidemia     on no meds, diet managed    Neuropathy     BOTH FEET POST     Peroneal tendonitis of right lower leg 2019    Placenta previa 2005    Post-menopause on HRT (hormone replacement therapy)     Continuously on HRT since about     Preeclampsia     Psoriasis     Recurrent pregnancy loss, antepartum condition or complication ,     Vitamin D deficiency 2017    Takes 1000 IU / day.       Family History   Problem Relation Age of Onset    Dementia Mother 75        Alzheimers Diseasse.    Cataracts Mother     Hyperlipidemia Father     Hypertension Father     Heart valve disorder Father         valve repair     COPD Father         non-smoker    Cataracts Father     Melanoma Father     Seizures Sister 48    Cataracts Maternal Grandmother     Osteoarthritis Maternal Grandmother     Osteoarthritis Paternal Grandmother     No Known Problems Daughter     Down syndrome Daughter     Rheum arthritis Daughter         remission     ADD / ADHD Son     Other Son         Human Growth Hormone deficiency    No Known Problems Brother         adopted     No Known Problems Maternal Grandfather          in his 80's.    No Known Problems Paternal Grandfather          at ~ 89.    No Known Problems Sister   "   Diabetes type II Maternal Aunt     Diabetes Maternal Aunt     Testicular cancer Maternal Uncle     Heart attack Paternal Aunt     Heart disease Other     Hypertension Other     BRCA 1/2 Neg Hx     Breast cancer Neg Hx     Colon cancer Neg Hx     Endometrial cancer Neg Hx     Ovarian cancer Neg Hx     Malig Hyperthermia Neg Hx        Social History     Socioeconomic History    Marital status:      Spouse name: JOSE D    Number of children: 3    Years of education: College   Tobacco Use    Smoking status: Never     Passive exposure: Never    Smokeless tobacco: Never   Vaping Use    Vaping status: Never Used   Substance and Sexual Activity    Alcohol use: Yes     Alcohol/week: 2.0 standard drinks of alcohol     Types: 2 Drinks containing 0.5 oz of alcohol per week     Comment: occasionally    Drug use: No    Sexual activity: Yes     Partners: Male     Birth control/protection: Post-menopausal     Comment: SPOUSE =  JOSE D        Past Surgical History:   Procedure Laterality Date    BREAST BIOPSY      Path= Negative. RIGHT    BREAST BIOPSY Right 2019    Procedure: BREAST BIOPSY WITH NEEDLE LOCALIZATION;  Surgeon: Andrew Pham MD;  Location[facility];  Service: General     SECTION      baby boy \"PETTY\" ; CS for placenta previa.    COLONOSCOPY  [id number]    Normal @ 51. Repeat in ten years.    COLONOSCOPY N/A 2022    2 TUBULAR ADENOMA POLYPS IN TRANSVERSE, 7 MM TUBULAR ADENOMA POLYP IN TRANSVERSE, RESCOPE IN 3 YRS, DR. KIERSTEN ORTIZ AT [facility]    DIAGNOSTIC LAPAROSCOPY      Dr. Fabian Al, hysteroscopy w/ separtion of minimal intrauterine septum.  Laparoscopy w/ CO2 laser, lysis of adhesions and ablation of endometriosis.  Endometriosis found in the lateral LEFT pelvic sidewall, peritoneum and deeper endometriosis of the LEFT and RIGHT uterosacral ligaments.  Complex superficial endometriosis of the LEFT and RIGHT ovary.Fallopian tubes were patent with methyleneblue    " "DILATATION AND CURETTAGE  1998    x2  1998      ENDOMETRIAL ABLATION  1998    MOLE REMOVAL  04/09/2019    WISDOM TOOTH EXTRACTION         The following portions of the patient's history were reviewed and updated as appropriate: problem list, allergies, current medications, past medical history, past family history, past social history, and past surgical history.    Review of Systems    Immunization History   Administered Date(s) Administered    COVID-19 (MODERNA) 12YRS+ (SPIKEVAX) 09/07/2024    COVID-19 (MODERNA) 1st,2nd,3rd Dose Monovalent 01/13/2021, 02/12/2021, 11/19/2021    COVID-19 (MODERNA) Monovalent Original Booster 09/17/2022    COVID-19 (PFIZER) 12YRS+ (COMIRNATY) 09/29/2023    Flu Vaccine Quad PF >36MO 11/19/2021, 09/17/2022    Fluzone (or Fluarix & Flulaval for VFC) >6mos 11/02/2019    Hepatitis A 06/07/2019, 12/06/2019    Influenza Inj MDCK Preserative Free 09/07/2024    Influenza Injectable Mdck Pf Quad 09/29/2023    Shingrix 12/17/2020, 07/02/2021    Tdap 06/07/2019    flucelvax quad pfs =>4 YRS 10/07/2020       Objective   Vitals:    03/03/25 0942 03/03/25 1021   BP: 140/82 112/60   Pulse: 76    Resp: 14    Temp: 97.4 °F (36.3 °C)    Weight: 62.3 kg (137 lb 6.4 oz)    Height: 162.6 cm (64.02\")      Body mass index is 23.57 kg/m².  Physical Exam  Vitals reviewed.   Constitutional:       Appearance: Normal appearance. She is well-developed.   HENT:      Head: Normocephalic and atraumatic.      Right Ear: Tympanic membrane, ear canal and external ear normal.      Left Ear: Tympanic membrane, ear canal and external ear normal.      Nose: Nose normal.      Mouth/Throat:      Mouth: Mucous membranes are moist.      Pharynx: Oropharynx is clear. No oropharyngeal exudate or posterior oropharyngeal erythema.   Eyes:      General: No scleral icterus.     Extraocular Movements: Extraocular movements intact.      Conjunctiva/sclera: Conjunctivae normal.      Pupils: Pupils are equal, round, and reactive to " light.   Neck:      Thyroid: No thyromegaly.      Vascular: No carotid bruit.   Cardiovascular:      Rate and Rhythm: Normal rate and regular rhythm.      Heart sounds: Normal heart sounds. No murmur heard.  Pulmonary:      Effort: Pulmonary effort is normal.      Breath sounds: Normal breath sounds.   Abdominal:      General: Bowel sounds are normal. There is no distension.      Palpations: Abdomen is soft.      Tenderness: There is no abdominal tenderness.   Musculoskeletal:      Cervical back: Neck supple.      Comments: Ambulates without assistance; no clubbing, cyanosis or edema.    Lymphadenopathy:      Cervical: No cervical adenopathy.   Skin:     General: Skin is warm and dry.   Neurological:      Mental Status: She is alert.   Psychiatric:         Mood and Affect: Mood normal.         Behavior: Behavior normal.         Procedures    Assessment & Plan   Diagnoses and all orders for this visit:    1. Annual physical exam (Primary)  Comments:  150 minutes weekly aerobic exercise advised. Schedule CLS with Dr. Mendenhall in June. P/P UTD with Dr. Geiger.    2. Essential hypertension  Comments:  Resolved. Controlled with LSM.    3. Moderate mixed hyperlipidemia not requiring statin therapy  Comments:  She was not fasting prior to labs. Repeat FLP in a month.  Orders:  -     Comprehensive Metabolic Panel; Future  -     Lipid Panel With LDL / HDL Ratio; Future    4. Age-related osteoporosis without current pathological fracture  Comments:  Start Reclast; potential side effects reviewed. DXA due in December.  Orders:  -     DEXA Bone Density Axial; Future    5. Vitamin D deficiency  Comments:  Resolved.    6. Colon cancer screening  Comments:  Schedule with Dr. Mendenhall; recall due in June.  Orders:  -     Ambulatory Referral For Screening Colonoscopy    7. Post-menopausal  -     DEXA Bone Density Axial; Future    Records reviewed include previous OV with myself and Dr. Llamas as well as labs.     Return in about 6 months  (around 9/3/2025) for 30; lab in 1M.

## 2025-03-03 ENCOUNTER — OFFICE VISIT (OUTPATIENT)
Dept: INTERNAL MEDICINE | Facility: CLINIC | Age: 65
End: 2025-03-03
Payer: COMMERCIAL

## 2025-03-03 VITALS
HEART RATE: 76 BPM | WEIGHT: 137.4 LBS | BODY MASS INDEX: 23.46 KG/M2 | TEMPERATURE: 97.4 F | RESPIRATION RATE: 14 BRPM | HEIGHT: 64 IN | SYSTOLIC BLOOD PRESSURE: 112 MMHG | DIASTOLIC BLOOD PRESSURE: 60 MMHG

## 2025-03-03 DIAGNOSIS — M81.0 AGE-RELATED OSTEOPOROSIS WITHOUT CURRENT PATHOLOGICAL FRACTURE: ICD-10-CM

## 2025-03-03 DIAGNOSIS — Z78.0 POST-MENOPAUSAL: ICD-10-CM

## 2025-03-03 DIAGNOSIS — E78.2 MODERATE MIXED HYPERLIPIDEMIA NOT REQUIRING STATIN THERAPY: Chronic | ICD-10-CM

## 2025-03-03 DIAGNOSIS — Z12.11 COLON CANCER SCREENING: ICD-10-CM

## 2025-03-03 DIAGNOSIS — M81.0 AGE-RELATED OSTEOPOROSIS WITHOUT CURRENT PATHOLOGICAL FRACTURE: Primary | ICD-10-CM

## 2025-03-03 DIAGNOSIS — Z00.00 ANNUAL PHYSICAL EXAM: Primary | ICD-10-CM

## 2025-03-03 DIAGNOSIS — E55.9 VITAMIN D DEFICIENCY: ICD-10-CM

## 2025-03-03 DIAGNOSIS — I10 ESSENTIAL HYPERTENSION: Chronic | ICD-10-CM

## 2025-03-03 PROCEDURE — 99396 PREV VISIT EST AGE 40-64: CPT | Performed by: NURSE PRACTITIONER

## 2025-03-03 RX ORDER — ZOLEDRONIC ACID 0.05 MG/ML
5 INJECTION, SOLUTION INTRAVENOUS ONCE
OUTPATIENT
Start: 2025-03-03

## 2025-03-03 RX ORDER — SODIUM CHLORIDE 9 MG/ML
20 INJECTION, SOLUTION INTRAVENOUS ONCE
OUTPATIENT
Start: 2025-03-03

## 2025-03-14 ENCOUNTER — HOSPITAL ENCOUNTER (OUTPATIENT)
Dept: INFUSION THERAPY | Facility: HOSPITAL | Age: 65
Discharge: HOME | End: 2025-03-14
Payer: COMMERCIAL

## 2025-03-14 VITALS
OXYGEN SATURATION: 99 % | SYSTOLIC BLOOD PRESSURE: 135 MMHG | BODY MASS INDEX: 23.5 KG/M2 | WEIGHT: 137 LBS | RESPIRATION RATE: 18 BRPM | DIASTOLIC BLOOD PRESSURE: 61 MMHG | HEART RATE: 73 BPM | TEMPERATURE: 97.1 F

## 2025-03-14 DIAGNOSIS — M81.0 AGE-RELATED OSTEOPOROSIS WITHOUT CURRENT PATHOLOGICAL FRACTURE: Primary | ICD-10-CM

## 2025-03-14 LAB
MAGNESIUM SERPL-MCNC: 2.2 MG/DL (ref 1.6–2.4)
PHOSPHATE SERPL-MCNC: 2.9 MG/DL (ref 2.5–4.5)

## 2025-03-14 PROCEDURE — 96374 THER/PROPH/DIAG INJ IV PUSH: CPT

## 2025-03-14 PROCEDURE — 36415 COLL VENOUS BLD VENIPUNCTURE: CPT

## 2025-03-14 PROCEDURE — 25010000002 ZOLEDRONIC ACID 5 MG/100ML SOLUTION: Performed by: NURSE PRACTITIONER

## 2025-03-14 PROCEDURE — 84100 ASSAY OF PHOSPHORUS: CPT | Performed by: NURSE PRACTITIONER

## 2025-03-14 PROCEDURE — 83735 ASSAY OF MAGNESIUM: CPT | Performed by: NURSE PRACTITIONER

## 2025-03-14 RX ORDER — SODIUM CHLORIDE 9 MG/ML
20 INJECTION, SOLUTION INTRAVENOUS ONCE
Status: DISCONTINUED | OUTPATIENT
Start: 2025-03-14 | End: 2025-03-14

## 2025-03-14 RX ORDER — ZOLEDRONIC ACID 0.05 MG/ML
5 INJECTION, SOLUTION INTRAVENOUS ONCE
Status: CANCELLED | OUTPATIENT
Start: 2025-03-14

## 2025-03-14 RX ORDER — ZOLEDRONIC ACID 0.05 MG/ML
5 INJECTION, SOLUTION INTRAVENOUS ONCE
Status: COMPLETED | OUTPATIENT
Start: 2025-03-14 | End: 2025-03-14

## 2025-03-14 RX ORDER — SODIUM CHLORIDE 9 MG/ML
20 INJECTION, SOLUTION INTRAVENOUS ONCE
Status: CANCELLED | OUTPATIENT
Start: 2025-03-14

## 2025-03-14 RX ADMIN — ZOLEDRONIC ACID 5 MG: 5 INJECTION, SOLUTION INTRAVENOUS at 13:53

## 2025-03-14 NOTE — PROGRESS NOTES
Creatinine Clearance 66.162ml/min per Cockcroft-Gault Calculator.   Lab order parameters met.   Reclast given per order.   Patient tolerated infusion without complaints and dc'd 30 min after infusion ended  Patient ambulatory, D/C'd from ACU at infusion end

## 2025-04-05 ENCOUNTER — RESULTS FOLLOW-UP (OUTPATIENT)
Dept: INTERNAL MEDICINE | Facility: CLINIC | Age: 65
End: 2025-04-05

## 2025-04-05 DIAGNOSIS — R74.8 ELEVATED ALKALINE PHOSPHATASE LEVEL: Primary | ICD-10-CM

## 2025-04-07 DIAGNOSIS — R74.8 ELEVATED ALKALINE PHOSPHATASE LEVEL: Primary | ICD-10-CM

## 2025-04-30 ENCOUNTER — TELEPHONE (OUTPATIENT)
Dept: INTERNAL MEDICINE | Facility: CLINIC | Age: 65
End: 2025-04-30
Payer: COMMERCIAL

## 2025-04-30 NOTE — TELEPHONE ENCOUNTER
- pt is coming in @ 10:00 today  - please let [facility] if we can change thee order Tiffany - from [facility]  ( [phone]) called and stated needs clarification on a order  for Bone scan patient is coming there tomorrow needs to know if she wants order to be whole scan  or 3 phase . Please advise

## 2025-05-02 ENCOUNTER — HOSPITAL ENCOUNTER (OUTPATIENT)
Dept: NUCLEAR MEDICINE | Facility: HOSPITAL | Age: 65
Discharge: HOME | End: 2025-05-02
Payer: COMMERCIAL

## 2025-05-02 DIAGNOSIS — R74.8 ELEVATED ALKALINE PHOSPHATASE LEVEL: Primary | ICD-10-CM

## 2025-05-02 PROCEDURE — 34310000005 TECHNETIUM MEDRONATE KIT: Performed by: NURSE PRACTITIONER

## 2025-05-02 PROCEDURE — 78306 BONE IMAGING WHOLE BODY: CPT

## 2025-05-02 PROCEDURE — A9503 TC99M MEDRONATE: HCPCS | Performed by: NURSE PRACTITIONER

## 2025-05-02 RX ORDER — TC 99M MEDRONATE 20 MG/10ML
20 INJECTION, POWDER, LYOPHILIZED, FOR SOLUTION INTRAVENOUS
Status: COMPLETED | OUTPATIENT
Start: 2025-05-02 | End: 2025-05-02

## 2025-05-02 RX ADMIN — Medication 20 MILLICURIE: at 10:27

## 2025-05-02 NOTE — TELEPHONE ENCOUNTER
Spoke with gt she said it needs a new order for whole bone scan instead of 3 phase. Please place order

## 2025-05-07 ENCOUNTER — TELEPHONE (OUTPATIENT)
Dept: INTERNAL MEDICINE | Facility: CLINIC | Age: 65
End: 2025-05-07
Payer: COMMERCIAL

## 2025-05-19 ENCOUNTER — OFFICE VISIT (OUTPATIENT)
Dept: OBSTETRICS AND GYNECOLOGY | Age: 65
End: 2025-05-19
Payer: COMMERCIAL

## 2025-05-19 VITALS
WEIGHT: 139 LBS | BODY MASS INDEX: 23.73 KG/M2 | HEIGHT: 64 IN | DIASTOLIC BLOOD PRESSURE: 74 MMHG | SYSTOLIC BLOOD PRESSURE: 122 MMHG

## 2025-05-19 DIAGNOSIS — Z12.31 SCREENING MAMMOGRAM FOR BREAST CANCER: ICD-10-CM

## 2025-05-19 DIAGNOSIS — Z01.419 ENCOUNTER FOR GYNECOLOGICAL EXAMINATION WITHOUT ABNORMAL FINDING: Primary | ICD-10-CM

## 2025-05-19 DIAGNOSIS — N60.02 CYST OF LEFT BREAST: ICD-10-CM

## 2025-05-19 DIAGNOSIS — Z12.39 SCREENING FOR BREAST CANCER USING NON-MAMMOGRAM MODALITY: ICD-10-CM

## 2025-05-19 NOTE — PROGRESS NOTES
Routine Annual Visit    2025    Patient: Aleja Garcia          MR#:[id number]      Chief Complaint   Patient presents with    Gynecologic Exam     Annual Exam - last pap 24 neg, diagnostic mammo & US 6/10/24, dexa 23, colonoscopy 22, pt has no complaints today       History of Present Illness    64 y.o. female  who presents for annual exam.     Patient is feeling well  Pap is up-to-date  She is overdue on her follow-up breast check  We will order this today as a screening and diagnostic follow-up  Recommendation was made for MRI every 2 years  She will be due for an MRI in   No complaints today  Colonoscopy is up-to-date        Patient's last menstrual period was 2012 (exact date).  Obstetric History:  OB History          5    Para   3    Term   2       1    AB   2    Living   3         SAB   0    IAB   0    Ectopic   0    Molar   0    Multiple   0    Live Births   3               Menstrual History:     Patient's last menstrual period was 2012 (exact date).       Sexual History:       ________________________________________  Patient Active Problem List   Diagnosis    Insomnia    Allergic rhinitis    Peripheral neuropathy    Atypical ductal hyperplasia of right breast    Peroneal tendonitis of right lower leg    Age-related osteoporosis without current pathological fracture    Hyperlipidemia       Past Medical History:   Diagnosis Date    Age-related osteoporosis without current pathological fracture 2022    Lumbar spine and hips.     Allergic rhinitis 2017    Arthritis     Atypical mole 2019    Mole with melanocytic atypia removed from back with MOHS procedure. Followed by Dermatologist.    Cancer 2019    Breast abnormalities    Closed nondisplaced fracture of fifth right metatarsal bone 2019    Colon polyps     FOLLOWED BY DR. KIERSTEN ORTIZ    Endometriosis     Female infertility     Hepatitis     unknown type,as a child, lived in  "[place]    Hiatal hernia     Hyperlipidemia     on no meds, diet managed    Knee swelling 3/2022    Neuropathy     BOTH FEET POST     Peroneal tendonitis of right lower leg 2019    Placenta previa 2005    Post-menopause on HRT (hormone replacement therapy)     Continuously on HRT since about     Preeclampsia 2005    Psoriasis     Recurrent pregnancy loss, antepartum condition or complication ,     Scoliosis 1970    Urinary tract infection     not recently    Varicella 1970?    Vitamin D deficiency 2017    Takes 1000 IU / day.       Past Surgical History:   Procedure Laterality Date    BREAST BIOPSY      Path= Negative. RIGHT    BREAST BIOPSY Right 2019    Procedure: BREAST BIOPSY WITH NEEDLE LOCALIZATION;  Surgeon: Andrew Pham MD;  [facility] [facility] [place] [facility]  Service: General     SECTION      baby boy \"PETTY\" ; CS for placenta previa.    COLONOSCOPY  [id number]    Normal @ 51. Repeat in ten years.    COLONOSCOPY N/A 2022    2 TUBULAR ADENOMA POLYPS IN TRANSVERSE, 7 MM TUBULAR ADENOMA POLYP IN TRANSVERSE, RESCOPE IN 3 YRS, DR. KIERSTEN ORTIZ AT [facility]    DIAGNOSTIC LAPAROSCOPY      Dr. Fabian Al, hysteroscopy w/ separtion of minimal intrauterine septum.  Laparoscopy w/ CO2 laser, lysis of adhesions and ablation of endometriosis.  Endometriosis found in the lateral LEFT pelvic sidewall, peritoneum and deeper endometriosis of the LEFT and RIGHT uterosacral ligaments.  Complex superficial endometriosis of the LEFT and RIGHT ovary.Fallopian tubes were patent with methyleneblue    DILATATION AND CURETTAGE  1998    x2  1998      ENDOMETRIAL ABLATION  1998    MOLE REMOVAL  2019    WISDOM TOOTH EXTRACTION  ?       Social History     Tobacco Use   Smoking Status Never    Passive exposure: Never   Smokeless Tobacco Never       has a current medication list which includes the following prescription(s): acetaminophen, biotin, calcium " "carbonate, fluocinonide, melatonin, multiple vitamins-minerals, vitamin c, and vitamin d (cholecalciferol).  ________________________________________    Current contraception: post menopausal status  History of abnormal Pap smear: no  Family history of Breast cancer: no  Family history of uterine or ovarian cancer: no  Family History of colon cancer/colon polyps: no  History of abnormal mammogram: yes - personal history of atypical ductal hyperplasia      The following portions of the patient's history were reviewed and updated as appropriate: allergies, current medications, past family history, past medical history, past social history, past surgical history, and problem list.    Review of Systems    Pertinent items are noted in HPI.     Objective   Physical Exam    /74 (BP Location: Left arm, Patient Position: Sitting)   Ht 162.6 cm (64\")   Wt 63 kg (139 lb)   LMP 03/07/2012 (Exact Date)   BMI 23.86 kg/m²    BP Readings from Last 3 Encounters:   05/19/25 122/74   03/14/25 135/61   03/03/25 112/60      Wt Readings from Last 3 Encounters:   05/19/25 63 kg (139 lb)   03/14/25 62.1 kg (137 lb)   03/03/25 62.3 kg (137 lb 6.4 oz)      BMI: Estimated body mass index is 23.86 kg/m² as calculated from the following:    Height as of this encounter: 162.6 cm (64\").    Weight as of this encounter: 63 kg (139 lb).      General:   alert, appears stated age, and cooperative   Abdomen: soft, non-tender, without masses or organomegaly   Breast: inspection negative, no nipple discharge or bleeding, no masses or nodularity palpable   Vulva: normal, Bartholin's, Urethra, [place]'s normal   Vagina: normal mucosa   Cervix: no cervical motion tenderness and no lesions   Uterus: normal size, mobile, and non-tender   Adnexa: no mass, fullness, tenderness     Assessment:    1. Normal annual exam   Assessment     ICD-10-CM ICD-9-CM   1. Encounter for gynecological examination without abnormal finding  Z01.419 V72.31   2. Screening " mammogram for breast cancer  Z12.31 V76.12   3. Screening for breast cancer using non-mammogram modality  Z12.39 V76.10   4. Cyst of left breast  N60.02 610.0     Plan:    Plan     [x]  Mammogram request made  []  PAP done  []  Labs:   []  GC/Chl/TV  []  DEXA scan   []  Referral for colonoscopy:       Diagnoses and all orders for this visit:    1. Encounter for gynecological examination without abnormal finding (Primary)    2. Screening mammogram for breast cancer  -     Cancel: Mammo Screening Digital Tomosynthesis Bilateral With CAD; Future    3. Screening for breast cancer using non-mammogram modality  -     MRI breast bilateral screening w wo contrast; Future    4. Cyst of left breast  -     US breast left limited; Future  -     Mammo Diagnostic Digital Tomosynthesis Bilateral With CAD; Future            Counseling:  --Nutrition: Stressed importance of moderation and caloric balance, stressed fresh fruit and vegetables  --Exercise: Stressed the importance of regular exercise. 3-5 times weekly   - Discussed screening mammogram recommendations.   --Discussed benefits of screening colonoscopy- age 45 unless FH  --Discussed pap smear screening recommendations

## 2025-06-25 ENCOUNTER — HOSPITAL ENCOUNTER (OUTPATIENT)
Dept: ULTRASOUND IMAGING | Facility: HOSPITAL | Age: 65
Discharge: HOME | End: 2025-06-25
Payer: COMMERCIAL

## 2025-06-25 ENCOUNTER — HOSPITAL ENCOUNTER (OUTPATIENT)
Dept: MAMMOGRAPHY | Facility: HOSPITAL | Age: 65
Discharge: HOME | End: 2025-06-25
Payer: COMMERCIAL

## 2025-06-25 DIAGNOSIS — N60.02 CYST OF LEFT BREAST: ICD-10-CM

## 2025-06-25 PROCEDURE — 76642 ULTRASOUND BREAST LIMITED: CPT

## 2025-06-25 PROCEDURE — G0279 TOMOSYNTHESIS, MAMMO: HCPCS

## 2025-06-25 PROCEDURE — 77066 DX MAMMO INCL CAD BI: CPT

## 2025-06-30 DIAGNOSIS — N60.02 CYST OF LEFT BREAST: Primary | ICD-10-CM

## 2025-06-30 DIAGNOSIS — Z12.31 SCREENING MAMMOGRAM FOR BREAST CANCER: ICD-10-CM

## 2025-07-21 ENCOUNTER — OFFICE VISIT (OUTPATIENT)
Dept: INTERNAL MEDICINE | Facility: CLINIC | Age: 65
End: 2025-07-21
Payer: COMMERCIAL

## 2025-07-21 ENCOUNTER — LAB (OUTPATIENT)
Facility: HOSPITAL | Age: 65
End: 2025-07-21
Payer: COMMERCIAL

## 2025-07-21 VITALS
BODY MASS INDEX: 24.28 KG/M2 | RESPIRATION RATE: 14 BRPM | SYSTOLIC BLOOD PRESSURE: 126 MMHG | DIASTOLIC BLOOD PRESSURE: 72 MMHG | WEIGHT: 142.2 LBS | HEIGHT: 64 IN | HEART RATE: 72 BPM

## 2025-07-21 DIAGNOSIS — M79.605 PAIN AND SWELLING OF LEFT LOWER EXTREMITY: Primary | ICD-10-CM

## 2025-07-21 DIAGNOSIS — M79.89 PAIN AND SWELLING OF LEFT LOWER EXTREMITY: Primary | ICD-10-CM

## 2025-07-21 LAB
ALBUMIN SERPL-MCNC: 4.1 G/DL (ref 3.5–5.2)
ALBUMIN/GLOB SERPL: 1.5 G/DL
ALP SERPL-CCNC: 104 U/L (ref 39–117)
ALT SERPL W P-5'-P-CCNC: 15 U/L (ref 1–33)
ANION GAP SERPL CALCULATED.3IONS-SCNC: 10.7 MMOL/L (ref 5–15)
AST SERPL-CCNC: 20 U/L (ref 1–32)
BACTERIA UR QL AUTO: ABNORMAL /HPF
BILIRUB SERPL-MCNC: 0.2 MG/DL (ref 0–1.2)
BILIRUB UR QL STRIP: NEGATIVE
BUN SERPL-MCNC: 17 MG/DL (ref 8–23)
BUN/CREAT SERPL: 19.8 (ref 7–25)
CALCIUM SPEC-SCNC: 9.7 MG/DL (ref 8.6–10.5)
CHLORIDE SERPL-SCNC: 104 MMOL/L (ref 98–107)
CLARITY UR: ABNORMAL
CO2 SERPL-SCNC: 27.3 MMOL/L (ref 22–29)
COLOR UR: YELLOW
CREAT SERPL-MCNC: 0.86 MG/DL (ref 0.57–1)
D DIMER PPP FEU-MCNC: 0.29 MCGFEU/ML (ref 0–0.63)
EGFRCR SERPLBLD CKD-EPI 2021: 76 ML/MIN/1.73
GLOBULIN UR ELPH-MCNC: 2.7 GM/DL
GLUCOSE SERPL-MCNC: 124 MG/DL (ref 65–99)
GLUCOSE UR STRIP-MCNC: NEGATIVE MG/DL
HGB UR QL STRIP.AUTO: NEGATIVE
HOLD SPECIMEN: NORMAL
HYALINE CASTS UR QL AUTO: ABNORMAL /LPF
KETONES UR QL STRIP: NEGATIVE
LEUKOCYTE ESTERASE UR QL STRIP.AUTO: ABNORMAL
NITRITE UR QL STRIP: NEGATIVE
PH UR STRIP.AUTO: 7 [PH] (ref 5–8)
POTASSIUM SERPL-SCNC: 4 MMOL/L (ref 3.5–5.2)
PROT SERPL-MCNC: 6.8 G/DL (ref 6–8.5)
PROT UR QL STRIP: NEGATIVE
RBC # UR STRIP: ABNORMAL /HPF
REF LAB TEST METHOD: ABNORMAL
SODIUM SERPL-SCNC: 142 MMOL/L (ref 136–145)
SP GR UR STRIP: 1.02 (ref 1–1.03)
SQUAMOUS #/AREA URNS HPF: ABNORMAL /HPF
TSH SERPL DL<=0.05 MIU/L-ACNC: 2.45 UIU/ML (ref 0.27–4.2)
UROBILINOGEN UR QL STRIP: ABNORMAL
WBC # UR STRIP: ABNORMAL /HPF

## 2025-07-21 PROCEDURE — 85379 FIBRIN DEGRADATION QUANT: CPT | Performed by: NURSE PRACTITIONER

## 2025-07-21 PROCEDURE — 87086 URINE CULTURE/COLONY COUNT: CPT | Performed by: NURSE PRACTITIONER

## 2025-07-21 PROCEDURE — 36415 COLL VENOUS BLD VENIPUNCTURE: CPT | Performed by: NURSE PRACTITIONER

## 2025-07-21 PROCEDURE — 80053 COMPREHEN METABOLIC PANEL: CPT | Performed by: NURSE PRACTITIONER

## 2025-07-21 PROCEDURE — 99213 OFFICE O/P EST LOW 20 MIN: CPT | Performed by: NURSE PRACTITIONER

## 2025-07-21 PROCEDURE — 84443 ASSAY THYROID STIM HORMONE: CPT | Performed by: NURSE PRACTITIONER

## 2025-07-21 PROCEDURE — 81001 URINALYSIS AUTO W/SCOPE: CPT | Performed by: NURSE PRACTITIONER

## 2025-07-21 NOTE — PROGRESS NOTES
Subjective   No chief complaint on file.      History of Present Illness   64 y.o. female presents with cc swelling and pain in left lower extremity. Started initially in both ankles last week. She thought she had been having too much salt and cut back. Swelling improved in the right but left still has mild swelling and sometimes pain that goes down into the arch of her foot. Started wearing compression stockings with improvement but not resolution. Ice improved the redness. No fever or chills. Denies chest pain or dyspnea.     She had cellulitis in same area last year successfully treated with Keflex last year.     She has had issues with this the last couple of summers since she was stung by a jelly fish in her left lower extremity in 2022.      Patient Active Problem List   Diagnosis    Insomnia    Allergic rhinitis    Peripheral neuropathy    Atypical ductal hyperplasia of right breast    Peroneal tendonitis of right lower leg    Age-related osteoporosis without current pathological fracture    Hyperlipidemia       Allergies   Allergen Reactions    Clindamycin/Lincomycin Rash       Current Outpatient Medications on File Prior to Visit   Medication Sig Dispense Refill    acetaminophen (Tylenol) 325 MG tablet Take 2 tablets by mouth Every 6 (Six) Hours As Needed for Mild Pain or Fever.      Biotin 5000 MCG capsule Take  by mouth.      calcium carbonate (OS-MARCO A) 600 MG tablet Take 1 tablet by mouth Daily.      fluocinonide (LIDEX) 0.05 % ointment Apply  topically to the appropriate area as directed As Needed (psoriasis).      melatonin 3 MG tablet Take 1 tablet by mouth At Night As Needed for Sleep.      Multiple Vitamins-Minerals (WOMENS MULTIVITAMIN PLUS PO) Take 1 tablet by mouth Daily.      vitamin C (ASCORBIC ACID) 250 MG tablet Take 1 tablet by mouth Daily.      Vitamin D, Cholecalciferol, 1000 units capsule Take 1 capsule by mouth Daily.       No current facility-administered medications on file prior to  visit.       Past Medical History:   Diagnosis Date    Age-related osteoporosis without current pathological fracture 2022    Lumbar spine and hips.     Allergic rhinitis 2017    Arthritis     Atypical mole 2019    Mole with melanocytic atypia removed from back with MOHS procedure. Followed by Dermatologist.    Cancer 2019    Breast abnormalities    Closed nondisplaced fracture of fifth right metatarsal bone 2019    Colon polyps     FOLLOWED BY DR. KIERSTEN ORTIZ    Endometriosis     Female infertility     Hepatitis     unknown type,as a child, lived [place] another country    Hiatal hernia     Hyperlipidemia     on no meds, diet managed    Knee swelling 3/2022    Neuropathy     BOTH FEET POST     Peroneal tendonitis of right lower leg 2019    Placenta previa 2005    Post-menopause on HRT (hormone replacement therapy)     Continuously on HRT since about     Preeclampsia     Psoriasis     Recurrent pregnancy loss, antepartum condition or complication ,     Scoliosis 1970    Urinary tract infection     not recently    Varicella 1970?    Vitamin D deficiency 2017    Takes 1000 IU / day.       Family History   Problem Relation Age of Onset    Dementia Mother 75        Alzheimers Diseasse.    Cataracts Mother     Hyperlipidemia Father     Hypertension Father     Heart valve disorder Father         valve repair     COPD Father         non-smoker    Cataracts Father     Melanoma Father     Seizures Sister 48    Cataracts Maternal Grandmother     Osteoarthritis Maternal Grandmother     Osteoarthritis Paternal Grandmother     No Known Problems Daughter     Down syndrome Daughter     Rheum arthritis Daughter         remission     ADD / ADHD Son     Other Son         Human Growth Hormone deficiency    No Known Problems Brother         adopted     No Known Problems Maternal Grandfather          in his 80's.    No Known Problems Paternal Grandfather          at ~  "89.    No Known Problems Sister     Diabetes type II Maternal Aunt     Diabetes Maternal Aunt     Testicular cancer Maternal Uncle     Heart attack Paternal Aunt     Heart disease Other     Hypertension Other     BRCA 1/2 Neg Hx     Breast cancer Neg Hx     Colon cancer Neg Hx     Endometrial cancer Neg Hx     Ovarian cancer Neg Hx     Malig Hyperthermia Neg Hx        Social History     Socioeconomic History    Marital status:      Spouse name: JOSE D    Number of children: 3    Years of education: College   Tobacco Use    Smoking status: Never     Passive exposure: Never    Smokeless tobacco: Never   Vaping Use    Vaping status: Never Used   Substance and Sexual Activity    Alcohol use: Yes     Alcohol/week: 2.0 standard drinks of alcohol     Comment: I drink alcohol less than once a month, wine or magaritias    Drug use: No    Sexual activity: Yes     Partners: Male     Birth control/protection: Post-menopausal     Comment: SPOUSE =  JOSE D        Past Surgical History:   Procedure Laterality Date    BREAST BIOPSY      Path= Negative. RIGHT    BREAST BIOPSY Right 2019    Procedure: BREAST BIOPSY WITH NEEDLE LOCALIZATION;  Surgeon: Andrew Pham MD;  Location: [facility] OR [facility] [place];  Service: General     SECTION      baby boy \"PETTY\" ; CS for placenta previa.    COLONOSCOPY  [id number]    Normal @ 51. Repeat in ten years.    COLONOSCOPY N/A 2022    2 TUBULAR ADENOMA POLYPS IN TRANSVERSE, 7 MM TUBULAR ADENOMA POLYP IN TRANSVERSE, RESCOPE IN 3 YRS, DR. KIERSTEN ORTIZ AT [facility]    DIAGNOSTIC LAPAROSCOPY      Dr. Fabian Al, hysteroscopy w/ separtion of minimal intrauterine septum.  Laparoscopy w/ CO2 laser, lysis of adhesions and ablation of endometriosis.  Endometriosis found in the lateral LEFT pelvic sidewall, peritoneum and deeper endometriosis of the LEFT and RIGHT uterosacral ligaments.  Complex superficial endometriosis of the LEFT and RIGHT ovary.Fallopian tubes were patent with " methyleneblue    DILATATION AND CURETTAGE  1998    x2  1998      ENDOMETRIAL ABLATION  1998    MOLE REMOVAL  2019    WISDOM TOOTH EXTRACTION  ?       The following portions of the patient's history were reviewed and updated as appropriate: problem list, allergies, current medications, past medical history, and past social history.    Review of Systems    Immunization History   Administered Date(s) Administered    COVID-19 (MODERNA) 12YRS+ (SPIKEVAX) 2024    COVID-19 (MODERNA) 1st,2nd,3rd Dose Monovalent 2021, 2021, 2021    COVID-19 (MODERNA) Monovalent Original Booster 2022    COVID-19 (PFIZER) 12YRS+ (COMIRNATY) 2023    Flu Vaccine Quad PF >36MO 2021, 2022    Fluzone (or Fluarix & Flulaval for VFC) >6mos 2019    Hepatitis A 2019, 2019    Influenza Inj MDCK Preserative Free 2024    Influenza Injectable Mdck Pf Quad 2023    Shingrix 2020, 2021    Tdap 2019    flucelvax quad pfs =>4 YRS 10/07/2020       Objective   There were no vitals filed for this visit.  There is no height or weight on file to calculate BMI.  Physical Exam  Vitals reviewed.   Constitutional:       Appearance: Normal appearance. She is well-developed.   HENT:      Head: Normocephalic and atraumatic.   Cardiovascular:      Rate and Rhythm: Normal rate and regular rhythm.      Pulses:           Dorsalis pedis pulses are 2+ on the right side and 2+ on the left side.        Posterior tibial pulses are 2+ on the right side and 2+ on the left side.      Heart sounds: Normal heart sounds, S1 normal and S2 normal.   Pulmonary:      Effort: Pulmonary effort is normal.      Breath sounds: Normal breath sounds.   Musculoskeletal:      Left lower le+ Edema present.      Comments: Ambulates without assistance; no clubbing or cyanosis. Mild swelling noted left lower extremity (1+). 2+PPP bilaterally. No erythema or heat. Skin mildly pink.      Skin:      General: Skin is warm and dry.   Neurological:      Mental Status: She is alert.   Psychiatric:         Mood and Affect: Mood normal.         Behavior: Behavior normal.         Procedures    Assessment & Plan       No follow-ups on file.

## 2025-07-22 DIAGNOSIS — L03.116 CELLULITIS OF LEFT LOWER LEG: Primary | ICD-10-CM

## 2025-07-22 RX ORDER — CEPHALEXIN 500 MG/1
500 CAPSULE ORAL 3 TIMES DAILY
Qty: 21 CAPSULE | Refills: 0 | Status: SHIPPED | OUTPATIENT
Start: 2025-07-22 | End: 2025-07-29

## 2025-07-23 LAB — BACTERIA SPEC AEROBE CULT: NO GROWTH

## (undated) DEVICE — SUT SILK 2/0 FS BLK 18IN 685G

## (undated) DEVICE — ENCORE® LATEX ORTHO SIZE 8, STERILE LATEX POWDER-FREE SURGICAL GLOVE: Brand: ENCORE

## (undated) DEVICE — APPL CHLORAPREP W/TINT 26ML ORNG

## (undated) DEVICE — SPNG GZ WOVN 4X4IN 12PLY 10/BX STRL

## (undated) DEVICE — TUBING, SUCTION, 1/4" X 10', STRAIGHT: Brand: MEDLINE

## (undated) DEVICE — CANN O2 ETCO2 FITS ALL CONN CO2 SMPL A/ 7IN DISP LF

## (undated) DEVICE — LN SMPL CO2 SHTRM SD STREAM W/M LUER

## (undated) DEVICE — NDL HYPO PRECISIONGLIDE REG 25G 1 1/2

## (undated) DEVICE — KT ORCA ORCAPOD DISP STRL

## (undated) DEVICE — SUT VIC 3/0 SH 27IN J416H

## (undated) DEVICE — DRSNG SURESITE WNDW 4X4.5

## (undated) DEVICE — SENSR O2 OXIMAX FNGR A/ 18IN NONSTR

## (undated) DEVICE — 3M™ STERI-STRIP™ COMPOUND BENZOIN TINCTURE 40 BAGS/CARTON 4 CARTONS/CASE C1544: Brand: 3M™ STERI-STRIP™

## (undated) DEVICE — ADAPT CLN BIOGUARD AIR/H2O DISP

## (undated) DEVICE — UNDYED BRAIDED (POLYGLACTIN 910), SYNTHETIC ABSORBABLE SUTURE: Brand: COATED VICRYL

## (undated) DEVICE — IRRIGATOR BULB ASEPTO 60CC STRL

## (undated) DEVICE — ELECTRD BLD EDGE/INSUL1P 2.4X5.1MM STRL

## (undated) DEVICE — PK PROC MINOR TOWER 40